# Patient Record
Sex: MALE | Race: WHITE | NOT HISPANIC OR LATINO | Employment: OTHER | URBAN - METROPOLITAN AREA
[De-identification: names, ages, dates, MRNs, and addresses within clinical notes are randomized per-mention and may not be internally consistent; named-entity substitution may affect disease eponyms.]

---

## 2017-02-23 ENCOUNTER — LAB VISIT (OUTPATIENT)
Dept: LAB | Facility: HOSPITAL | Age: 77
End: 2017-02-23
Attending: FAMILY MEDICINE
Payer: MEDICARE

## 2017-02-23 DIAGNOSIS — E11.9 TYPE 2 DIABETES MELLITUS WITHOUT COMPLICATION, WITHOUT LONG-TERM CURRENT USE OF INSULIN: ICD-10-CM

## 2017-02-23 PROCEDURE — 36415 COLL VENOUS BLD VENIPUNCTURE: CPT | Mod: PO

## 2017-02-23 PROCEDURE — 83036 HEMOGLOBIN GLYCOSYLATED A1C: CPT

## 2017-02-24 LAB
ESTIMATED AVG GLUCOSE: 166 MG/DL
HBA1C MFR BLD HPLC: 7.4 %

## 2017-03-27 ENCOUNTER — LAB VISIT (OUTPATIENT)
Dept: LAB | Facility: HOSPITAL | Age: 77
End: 2017-03-27
Attending: FAMILY MEDICINE
Payer: MEDICARE

## 2017-03-27 ENCOUNTER — OFFICE VISIT (OUTPATIENT)
Dept: FAMILY MEDICINE | Facility: CLINIC | Age: 77
End: 2017-03-27
Payer: MEDICARE

## 2017-03-27 VITALS
HEIGHT: 75 IN | HEART RATE: 76 BPM | DIASTOLIC BLOOD PRESSURE: 64 MMHG | SYSTOLIC BLOOD PRESSURE: 121 MMHG | BODY MASS INDEX: 21.95 KG/M2 | WEIGHT: 176.56 LBS | TEMPERATURE: 98 F

## 2017-03-27 DIAGNOSIS — R20.8 DECREASED VIBRATORY SENSE: ICD-10-CM

## 2017-03-27 DIAGNOSIS — M35.3 PMR (POLYMYALGIA RHEUMATICA): ICD-10-CM

## 2017-03-27 DIAGNOSIS — M35.3 PMR (POLYMYALGIA RHEUMATICA): Primary | ICD-10-CM

## 2017-03-27 DIAGNOSIS — R53.1 GENERAL WEAKNESS: ICD-10-CM

## 2017-03-27 DIAGNOSIS — R26.9 GAIT DISTURBANCE: ICD-10-CM

## 2017-03-27 DIAGNOSIS — E11.40 TYPE 2 DIABETES MELLITUS WITH DIABETIC NEUROPATHY, WITHOUT LONG-TERM CURRENT USE OF INSULIN: ICD-10-CM

## 2017-03-27 LAB
ALBUMIN SERPL BCP-MCNC: 3.9 G/DL
ALP SERPL-CCNC: 65 U/L
ALT SERPL W/O P-5'-P-CCNC: 23 U/L
ANION GAP SERPL CALC-SCNC: 12 MMOL/L
AST SERPL-CCNC: 17 U/L
BASOPHILS # BLD AUTO: 0.02 K/UL
BASOPHILS NFR BLD: 0.3 %
BILIRUB SERPL-MCNC: 0.7 MG/DL
BUN SERPL-MCNC: 35 MG/DL
CALCIUM SERPL-MCNC: 9.7 MG/DL
CHLORIDE SERPL-SCNC: 108 MMOL/L
CO2 SERPL-SCNC: 22 MMOL/L
CREAT SERPL-MCNC: 1.2 MG/DL
CRP SERPL-MCNC: 3 MG/L
DIFFERENTIAL METHOD: ABNORMAL
EOSINOPHIL # BLD AUTO: 0.1 K/UL
EOSINOPHIL NFR BLD: 1.6 %
ERYTHROCYTE [DISTWIDTH] IN BLOOD BY AUTOMATED COUNT: 12.6 %
ERYTHROCYTE [SEDIMENTATION RATE] IN BLOOD BY WESTERGREN METHOD: 8 MM/HR
EST. GFR  (AFRICAN AMERICAN): >60 ML/MIN/1.73 M^2
EST. GFR  (NON AFRICAN AMERICAN): 58.4 ML/MIN/1.73 M^2
GLUCOSE SERPL-MCNC: 128 MG/DL
HCT VFR BLD AUTO: 42.7 %
HGB BLD-MCNC: 14.4 G/DL
LYMPHOCYTES # BLD AUTO: 1.6 K/UL
LYMPHOCYTES NFR BLD: 21.3 %
MCH RBC QN AUTO: 32.8 PG
MCHC RBC AUTO-ENTMCNC: 33.7 %
MCV RBC AUTO: 97 FL
MONOCYTES # BLD AUTO: 0.7 K/UL
MONOCYTES NFR BLD: 8.8 %
NEUTROPHILS # BLD AUTO: 5.2 K/UL
NEUTROPHILS NFR BLD: 67.6 %
PLATELET # BLD AUTO: 246 K/UL
PMV BLD AUTO: 12.1 FL
POTASSIUM SERPL-SCNC: 4.2 MMOL/L
PROT SERPL-MCNC: 7.1 G/DL
RBC # BLD AUTO: 4.39 M/UL
SODIUM SERPL-SCNC: 142 MMOL/L
TSH SERPL DL<=0.005 MIU/L-ACNC: 1.06 UIU/ML
WBC # BLD AUTO: 7.62 K/UL

## 2017-03-27 PROCEDURE — G0009 ADMIN PNEUMOCOCCAL VACCINE: HCPCS | Mod: S$GLB,,, | Performed by: FAMILY MEDICINE

## 2017-03-27 PROCEDURE — 86140 C-REACTIVE PROTEIN: CPT

## 2017-03-27 PROCEDURE — 85651 RBC SED RATE NONAUTOMATED: CPT | Mod: PO

## 2017-03-27 PROCEDURE — 3078F DIAST BP <80 MM HG: CPT | Mod: S$GLB,,, | Performed by: FAMILY MEDICINE

## 2017-03-27 PROCEDURE — 1159F MED LIST DOCD IN RCRD: CPT | Mod: S$GLB,,, | Performed by: FAMILY MEDICINE

## 2017-03-27 PROCEDURE — 84443 ASSAY THYROID STIM HORMONE: CPT

## 2017-03-27 PROCEDURE — 90670 PCV13 VACCINE IM: CPT | Mod: S$GLB,,, | Performed by: FAMILY MEDICINE

## 2017-03-27 PROCEDURE — 99214 OFFICE O/P EST MOD 30 MIN: CPT | Mod: 25,S$GLB,, | Performed by: FAMILY MEDICINE

## 2017-03-27 PROCEDURE — 99999 PR PBB SHADOW E&M-EST. PATIENT-LVL III: CPT | Mod: PBBFAC,,, | Performed by: FAMILY MEDICINE

## 2017-03-27 PROCEDURE — 1157F ADVNC CARE PLAN IN RCRD: CPT | Mod: S$GLB,,, | Performed by: FAMILY MEDICINE

## 2017-03-27 PROCEDURE — 1160F RVW MEDS BY RX/DR IN RCRD: CPT | Mod: S$GLB,,, | Performed by: FAMILY MEDICINE

## 2017-03-27 PROCEDURE — 86592 SYPHILIS TEST NON-TREP QUAL: CPT

## 2017-03-27 PROCEDURE — 82607 VITAMIN B-12: CPT

## 2017-03-27 PROCEDURE — 80053 COMPREHEN METABOLIC PANEL: CPT

## 2017-03-27 PROCEDURE — 99499 UNLISTED E&M SERVICE: CPT | Mod: S$GLB,,, | Performed by: FAMILY MEDICINE

## 2017-03-27 PROCEDURE — 3074F SYST BP LT 130 MM HG: CPT | Mod: S$GLB,,, | Performed by: FAMILY MEDICINE

## 2017-03-27 PROCEDURE — 85025 COMPLETE CBC W/AUTO DIFF WBC: CPT

## 2017-03-27 PROCEDURE — 36415 COLL VENOUS BLD VENIPUNCTURE: CPT | Mod: PO

## 2017-03-27 NOTE — MR AVS SNAPSHOT
[facility]  [address] [address] [facility] [address]  Phone: [phone]  Fax: [phone]                  Jagdeep Greenberg   3/27/2017 11:00 AM   Office Visit    Description:  Male : 1940   Provider:  Panda Contreras MD   Department:  [facility]           Reason for Visit     Dizziness           Diagnoses this Visit        Comments    PMR (polymyalgia rheumatica)    -  Primary     Gait disturbance         Decreased vibratory sense         Type 2 diabetes mellitus with diabetic neuropathy, without long-term current use of insulin         General weakness                To Do List           Future Appointments        Provider Department Dept Phone    2017 8:00 AM Cayetano Thacker MD [facility] Neurology [phone]    2017 8:35 AM LABORATORY, [place] [facility] [phone]      Goals (5 Years of Data)     None      [facility] On Call     [facility] Care [facility] -  Assistance  Registered nurses in the [facility] provide clinical advisement, health education, appointment booking, and other advisory services.  Call for this free service at [phone].             Medications           Message regarding Medications     Verify the changes and/or additions to your medication regime listed below are the same as discussed with your clinician today.  If any of these changes or additions are incorrect, please notify your healthcare provider.        STOP taking these medications     azithromycin (Z-ANAI) 250 MG tablet Take 2 tablets on day 1, then 1 tablet daily on days 2 - 5.    ketoconazole (NIZORAL) 2 % shampoo Apply topically once daily for 2 weeks.  Wash off after 5 minutes.    metronidazole 0.75% (METROCREAM) 0.75 % Crea Apply topically 2 (two) times daily.           Verify that the below list of medications is an accurate representation of the medications you are currently taking.  If none reported, the list may be  "blank. If incorrect, please contact your healthcare provider. Carry this list with you in case of emergency.           Current Medications     amlodipine (NORVASC) 10 MG tablet TAKE 1 TABLET BY MOUTH EVERY DAY    aspirin (ECOTRIN) 81 MG EC tablet Take 1 tablet (81 mg total) by mouth once daily.    benazepril-hydrochlorthiazide (LOTENSIN HCT) 20-12.5 mg per tablet TAKE 1 TABLET BY MOUTH EVERY DAY    blood sugar diagnostic (GLUCOSE BLOOD) Strp Test glucose 1 x daily    glimepiride (AMARYL) 1 MG tablet TAKE 1 TABLET BY MOUTH ONCE DAILY    GLUC.METER,DIS.P-LOADED STRIPS (GLUCOSE METER, DISP & STRIPS) Kit Check glucose once per day    lancets Misc As directed    metformin (GLUCOPHAGE) 1000 MG tablet TAKE 1 TABLET BY MOUTH DAILY WITH BREAKFAST    pravastatin (PRAVACHOL) 20 MG tablet TAKE 1 TABLET BY MOUTH DAILY           Clinical Reference Information           Your Vitals Were     BP Pulse Temp Height Weight BMI    121/64 76 98.2 °F (36.8 °C) (Oral) 6' 3" (1.905 m) 80.1 kg (176 lb 9.4 oz) 22.07 kg/m2      Blood Pressure          Most Recent Value    BP  121/64      Allergies as of 3/27/2017     No Known Allergies      Immunizations Administered on Date of Encounter - 3/27/2017     Name Date Dose VIS Date Route    Pneumococcal Conjugate - 13 Valent 3/27/2017 0.5 mL 11/5/2015 Intramuscular      Orders Placed During Today's Visit      Normal Orders This Visit    Ambulatory referral to Neurology     Pneumococcal Conjugate Vaccine (13 Valent) (IM)     Future Labs/Procedures Expected by Expires    C-reactive protein  3/27/2017 5/26/2018    CBC auto differential  3/27/2017 3/27/2018    Comprehensive metabolic panel  3/27/2017 3/27/2018    RPR  3/27/2017 3/27/2018    Sedimentation rate, manual  3/27/2017 3/27/2018    TSH  3/27/2017 3/28/2018    Vitamin B12  3/27/2017 3/27/2018      Language Assistance Services     ATTENTION: Language assistance services are available, free of charge. Please call [phone].      ATENCIÓN: Si " habla osmin, tiene a foster disposición servicios gratuitos de asistencia lingüística. Llame al [phone].     CHÚ Ý: N?u b?n nói Ti?ng Vi?t, có các d?ch v? h? tr? ngôn ng? mi?n phí dành cho b?n. G?i s? [phone].         [facility] complies with applicable Federal civil rights laws and does not discriminate on the basis of race, color, national origin, age, disability, or sex.

## 2017-03-28 ENCOUNTER — TELEPHONE (OUTPATIENT)
Dept: FAMILY MEDICINE | Facility: CLINIC | Age: 77
End: 2017-03-28

## 2017-03-28 LAB
RPR SER QL: NORMAL
VIT B12 SERPL-MCNC: 309 PG/ML

## 2017-03-28 NOTE — TELEPHONE ENCOUNTER
I don't see any significant issues on his laboratory which would explain his symptoms.  The tests for inflammation are not elevated so I don't think he needs to go back on the prednisone.  Please see if we can try to move the neurology appointment up sooner, within the next 2 weeks if possible.     Patient informed we will have to call about sooner appointment and get back to him

## 2017-03-28 NOTE — PROGRESS NOTES
Patient presents with complaint of feeling unstable when walking.  He feels like he's leaning his body forward head of his feet.  Seems to be hard to get started area seems to be hard to get up out of the chair.  He does get some discomfort in the shoulders thighs and buttocks.  Previous polymyalgia rheumatica, but has been off steroids for a while.  He has diabetes with evidence of neuropathy.  Does state drinking significant amount of alcohol, but cut back recently.  He denies any focal weakness.  He denies any obvious vertigo.  Lab Results   Component Value Date    HGBA1C 7.4 (H) 02/23/2017     Past Medical History:  Past Medical History:   Diagnosis Date    Adenomatous polyp of colon     Arthritis     Carotid artery plaque     Less than 50%    Chronic kidney disease, stage 3     Diabetes mellitus, type 2     Erectile dysfunction following radical prostatectomy     Fractures     Hyperlipidemia LDL goal < 100     Hypertension goal BP (blood pressure) < 130/80     Prostate cancer 2001     Past Surgical History:   Procedure Laterality Date    APPENDECTOMY      COLONOSCOPY  In 3/2/2010    Repeat 5 years    COLONOSCOPY N/A 9/29/2015    Procedure: COLONOSCOPY;  Surgeon: Erik Brandt MD;  Location: [facility];  Service: Endoscopy;  Laterality: N/A;    COLONOSCOPY N/A 10/11/2016    Procedure: COLONOSCOPY;  Surgeon: Erik Brandt MD;  Location: [facility];  Service: Endoscopy;  Laterality: N/A;    FRACTURE SURGERY      HERNIA REPAIR      LYMPH NODE DISSECTION      Pelvic    PROSTATECTOMY      SHOULDER ARTHROSCOPY      Right     Social History     Social History    Marital status:      Spouse name: Joaquin    Number of children: 0    Years of education: N/A     Occupational History    Retired      Social History Main Topics    Smoking status: Never Smoker    Smokeless tobacco: Not on file    Alcohol use 21.0 oz/week     14 Cans of beer, 21 Shots of liquor per week    Drug use: Not on  file    Sexual activity: Not on file     Other Topics Concern    Not on file     Social History Narrative     Family History   Problem Relation Age of Onset    Leukemia Father 68     Review of patient's allergies indicates:  No Known Allergies  Current Outpatient Prescriptions on File Prior to Visit   Medication Sig Dispense Refill    amlodipine (NORVASC) 10 MG tablet TAKE 1 TABLET BY MOUTH EVERY DAY 90 tablet 3    aspirin (ECOTRIN) 81 MG EC tablet Take 1 tablet (81 mg total) by mouth once daily.  0    benazepril-hydrochlorthiazide (LOTENSIN HCT) 20-12.5 mg per tablet TAKE 1 TABLET BY MOUTH EVERY DAY 90 tablet 0    blood sugar diagnostic (GLUCOSE BLOOD) Strp Test glucose 1 x daily 35 strip prn    glimepiride (AMARYL) 1 MG tablet TAKE 1 TABLET BY MOUTH ONCE DAILY 90 tablet 3    GLUC.METER,DIS.P-LOADED STRIPS (GLUCOSE METER, DISP & STRIPS) Kit Check glucose once per day 1 kit prn    lancets Misc As directed 100 each prn    metformin (GLUCOPHAGE) 1000 MG tablet TAKE 1 TABLET BY MOUTH DAILY WITH BREAKFAST 90 tablet 3    pravastatin (PRAVACHOL) 20 MG tablet TAKE 1 TABLET BY MOUTH DAILY 90 tablet 11    [DISCONTINUED] azithromycin (Z-ANAI) 250 MG tablet Take 2 tablets on day 1, then 1 tablet daily on days 2 - 5. 6 tablet 0    [DISCONTINUED] ketoconazole (NIZORAL) 2 % shampoo Apply topically once daily for 2 weeks.  Wash off after 5 minutes. 120 mL 2    [DISCONTINUED] metronidazole 0.75% (METROCREAM) 0.75 % Crea Apply topically 2 (two) times daily. 60 g 11     No current facility-administered medications on file prior to visit.            ROS:  GENERAL: No fever, chills,  or significant weight changes.   CARDIOVASCULAR: Denies chest pain, PND, orthopnea or reduced exercise tolerance.  ABDOMEN: Appetite fine. Denies diarrhea, abdominal pain, hematemesis or blood in stool.  URINARY: No flank pain, dysuria or hematuria.    OBJECTIVE:   Vitals:    03/27/17 1056   BP: 121/64   Pulse: 76   Temp: 98.2 °F (36.8 °C)  "  [facility]: Oral   Weight: 80.1 kg (176 lb 9.4 oz)   Height: 6' 3" (1.905 m)     Wt Readings from Last 3 Encounters:   03/27/17 80.1 kg (176 lb 9.4 oz)   11/07/16 80.3 kg (177 lb 2.2 oz)   10/11/16 79.8 kg (176 lb)       APPEARANCE: Well nourished, well developed, in no acute distress.   HEAD: Normocephalic. Atraumatic. No sinus tenderness.   EYES:   Right eye: Pupil reactive. Conjunctiva clear.   Left eye: Pupil reactive. Conjunctiva clear.   . EOMI.   EARS: TM's intact. Light reflex normal. No retraction or perforation.   NOSE: clear.   MOUTH & THROAT: No pharyngeal erythema or exudate. No lesions.   NECK: No bruits. No JVD. No cervical lymphadenopathy. No thyromegaly.   CHEST: Breath sounds clear bilaterally. Normal respiratory effort   CARDIOVASCULAR: Normal rate. Regular rhythm. No murmurs. No rub. No gallops.   ABDOMEN: Bowel sounds normal. Soft. No tenderness. No organomegaly.   PERIPHERAL VASCULAR: No cyanosis. No clubbing. No edema.   NEUROLOGIC: Somewhat hyperreflexic.  He has knee reflex when tapping the opposite knee as well as when tapping the biceps tendon in the arm.  Difficulty with tandem gait and Romberg.  Fairly stable with straight line gait, but seems more unsteady with turning.  Feet:Sensation in the feet somewhat diminished to monofilament testing.  Vibratory sensation diminished  No significant foot lesions.Pulses palpable.  MENTAL STATUS: Alert. Oriented x 3.           Jagdeep was seen today for dizziness.    Diagnoses and all orders for this visit:    PMR (polymyalgia rheumatica)  -     Sedimentation rate, manual; Future  -     C-reactive protein; Future  -     Vitamin B12; Future    Gait disturbance  -     Vitamin B12; Future  -     RPR; Future  -     Ambulatory referral to Neurology    Decreased vibratory sense  -     Vitamin B12; Future  -     Ambulatory referral to Neurology    Type 2 diabetes mellitus with diabetic neuropathy, without long-term current use of insulin  -     Vitamin B12; " Future  -     TSH; Future  -     Comprehensive metabolic panel; Future  -     RPR; Future  -     Ambulatory referral to Neurology    General weakness  -     Vitamin B12; Future  -     TSH; Future  -     CBC auto differential; Future  -     Comprehensive metabolic panel; Future  -     RPR; Future  -     Ambulatory referral to Neurology    Other orders  -     Pneumococcal Conjugate Vaccine (13 Valent) (IM)     recommend avoid alcohol.  Further follow-up pending above laboratory may need to be back on steroids for the polymyalgia

## 2017-03-28 NOTE — TELEPHONE ENCOUNTER
Per Dr Contreras request, is there anyway patient can be seen within the next 2 weeks, please advise.

## 2017-03-28 NOTE — TELEPHONE ENCOUNTER
----- Message from Trang Lagunas sent at 3/28/2017  1:07 PM CDT -----  Patient returning nurse call. Please adv/call [phone].//thanks. cw

## 2017-03-29 RX ORDER — PRAVASTATIN SODIUM 20 MG/1
TABLET ORAL
Qty: 90 TABLET | Refills: 3 | Status: SHIPPED | OUTPATIENT
Start: 2017-03-29 | End: 2018-03-23 | Stop reason: SDUPTHER

## 2017-03-29 NOTE — TELEPHONE ENCOUNTER
Agreeable to go to [facility] for sooner appt.  Message sent to KACIE Kathleen to assist in scheduling.

## 2017-03-29 NOTE — TELEPHONE ENCOUNTER
----- Message from Jory Mendoza sent at 3/29/2017  3:01 PM CDT -----  Contact: Pt   Pt called and stated he was returning a call to the nurse. He can be reached at [phone].    Thanks,  TF

## 2017-03-29 NOTE — TELEPHONE ENCOUNTER
Everton Kathleen, RN with neurology, can get patient in, at [facility], in 2 weeks, patient does not want to go to [facility]  He will look into someone local, through his insurance co. And let us know if he goes outside of [facility], for referral.

## 2017-04-11 ENCOUNTER — OFFICE VISIT (OUTPATIENT)
Dept: NEUROLOGY | Facility: CLINIC | Age: 77
End: 2017-04-11
Payer: MEDICARE

## 2017-04-11 VITALS
SYSTOLIC BLOOD PRESSURE: 162 MMHG | DIASTOLIC BLOOD PRESSURE: 79 MMHG | BODY MASS INDEX: 22.07 KG/M2 | HEART RATE: 73 BPM | WEIGHT: 176.56 LBS

## 2017-04-11 DIAGNOSIS — E11.22 TYPE 2 DIABETES MELLITUS WITH STAGE 3 CHRONIC KIDNEY DISEASE, UNSPECIFIED LONG TERM INSULIN USE STATUS: Primary | ICD-10-CM

## 2017-04-11 DIAGNOSIS — N18.3 TYPE 2 DIABETES MELLITUS WITH STAGE 3 CHRONIC KIDNEY DISEASE, UNSPECIFIED LONG TERM INSULIN USE STATUS: Primary | ICD-10-CM

## 2017-04-11 DIAGNOSIS — R41.3 MEMORY CHANGES: ICD-10-CM

## 2017-04-11 DIAGNOSIS — G20.C PARKINSONISM, UNSPECIFIED PARKINSONISM TYPE: ICD-10-CM

## 2017-04-11 PROCEDURE — 1157F ADVNC CARE PLAN IN RCRD: CPT | Mod: S$GLB,,, | Performed by: PSYCHIATRY & NEUROLOGY

## 2017-04-11 PROCEDURE — 1160F RVW MEDS BY RX/DR IN RCRD: CPT | Mod: S$GLB,,, | Performed by: PSYCHIATRY & NEUROLOGY

## 2017-04-11 PROCEDURE — 99999 PR PBB SHADOW E&M-EST. PATIENT-LVL III: CPT | Mod: PBBFAC,,, | Performed by: PSYCHIATRY & NEUROLOGY

## 2017-04-11 PROCEDURE — 3077F SYST BP >= 140 MM HG: CPT | Mod: S$GLB,,, | Performed by: PSYCHIATRY & NEUROLOGY

## 2017-04-11 PROCEDURE — 1159F MED LIST DOCD IN RCRD: CPT | Mod: S$GLB,,, | Performed by: PSYCHIATRY & NEUROLOGY

## 2017-04-11 PROCEDURE — 99205 OFFICE O/P NEW HI 60 MIN: CPT | Mod: S$GLB,,, | Performed by: PSYCHIATRY & NEUROLOGY

## 2017-04-11 PROCEDURE — 3078F DIAST BP <80 MM HG: CPT | Mod: S$GLB,,, | Performed by: PSYCHIATRY & NEUROLOGY

## 2017-04-11 NOTE — PATIENT INSTRUCTIONS
Magnetic Resonance Imaging (MRI)     You will be asked to hold very still during the scan.   Magnetic resonance imaging (MRI) is a test that lets your doctor see detailed pictures of the inside of your body. MRI combines the use of strong magnets and radio waves to form an MRI image.  How do I get ready for an MRI?  · You may need to stop eating or drinking before the test. Each health care facility has its own guidelines on this. It also depends on the type of exam you are having. Ask your health care provider if you should stop eating or drinking before the test.  · Ask your provider if you should stop taking any medicine before the test.  · Follow your normal daily routine unless your provider tells you otherwise.  · You'll be asked to remove your watch, jewelry, hearing aids, credit cards, pens, pocket knives, eyeglasses, and other metal objects.  · You may be asked to remove your makeup. Makeup may contain some metal.  · Most MRI tests take 30 to 60 minutes. Depending on the type of MRI you are having, the test may take longer. Give yourself extra time to check in.     MRI uses strong magnets. Metal is affected by magnets and can distort the image. The magnet used in MRI can cause metal objects in your body to move. If you have a metal implant, you may not be able to have an MRI unless the implant is certified as MRI safe. People with these implants should not have an MRI:  · Ear (cochlear) implants  · Certain clips used for brain aneurysms  · Certain metal coils put in blood vessels  · Most defibrillators  · Most pacemakers  Be sure to tell the radiologist or technologist if you:  · Have had any previous surgeries  · Have a pacemaker, surgical clips, metal plate or pins, an artificial joint, staples or screws, ear (cochlear) implants, or other implants  · Wear a medicated adhesive patch  · Have metal splinters in your body  · Have implanted nerve stimulators or drug-infusion ports  · Have tattoos or body  piercings. Some tattoo inks contain metal.  · Work with metal  · Have braces. You must remove any dental work.  · Have a bullet or other metal in your body  Also tell the radiologist or technologist if you:  · Are pregnant or think you may be  · Are afraid of small, enclosed spaces (claustrophobic)  · Are allergic to X-ray dye (contrast medium), iodine, shellfish, or any medicines  · Have other allergies  · Are breastfeeding  · Have a history of cancer  · Have any serious health problems. This includes kidney disease or a liver transplant. You may not be able to have the contrast material used for MRI.      What happens during an MRI?  · You may be asked to wear a hospital gown.  · You may be given earplugs to wear if you need them.  · You may be injected with a special dye (contrast) that improves the MRI image.   · Youll lie down on a platform that slides into the magnet.  What happens after an MRI?  · You can get back to normal activities right away. If you were given contrast, it will pass naturally through your body within a day. You may be told to drink more water or other fluids during this time.   · Your doctor will discuss the test results with you during a follow-up appointment or over the phone.  · Your next appointment is: __________________  Date Last Reviewed: 6/2/2015  © 5097-0032 Personally. [address] [place] [address], [place] [address]. All rights reserved. This information is not intended as a substitute for professional medical care. Always follow your healthcare professional's instructions.

## 2017-04-11 NOTE — PROGRESS NOTES
[facility] - NEUROLOGY EPILEPSY  [facility] [place] [facility]    Date: April 11, 2017   Patient Name: Jagdeep Greenberg   MRN: [id number]   PCP: Panda Contreras  Referring Provider: Panda Contreras MD    Assessmentn and Plan:   Jagdeep Greenberg is a 76 y.o. male presenting for evaluation of gait and memory changes with parkinsonian features noted on exam. Will initiate workup with MRI jamaal to evaluate for underlying pathology and prior to consideration of Sinemet trial. Patient not currently on meds known to lead to drug induced parkinsonism. Given memory changes with MOCA of 17/30, have referred patient for neuropsychiatry evaluation and given gait changes as well as potential parkinsonism, have requested PT/OT eval.    Problem List Items Addressed This Visit        Neuro    Parkinsonism    Relevant Orders    MRI Brain W WO Contrast    Ambulatory consult to Neuropsychology    Ambulatory Referral to Physical/Occupational Therapy    Memory changes    Relevant Orders    MRI Brain W WO Contrast    Ambulatory consult to Neuropsychology       Renal    Type 2 diabetes mellitus with stage 3 chronic kidney disease - Primary       Followed by medicine    Ernie Harden MD  [facility]   Department of Neurology    Patient note was created using Dragon Dictation.  Any errors in syntax or even information may not have been identified and edited on initial review prior to signing this note.  Subjective:   Patient seen in consultation at the request of Dr. Contreras for the evaluation of gait unsteadiness. A copy of this note will be sent to the referring physician.      HPI:   Mr. Jagdeep Greenberg is a 76 y.o. male who presents with a chief complaint of gait unsteadiness and changes. The patient presents with his wife, who contributes to the history. They state that over the last 4-5 months, the patient has had difficulty walking. He states that when he is walking quickly, he has no  "issues, however when he is walking slowly or needs to come to an abrupt stop, he "stumbles over his feet and can't stop." His wife feels his posture has become hunched and he is leaning to the right. He states his handwriting has deteriorated and he has been experiencing episodes of stiffness in his hands and legs. He also struggles getting out of a chair and states he feels his coordination is off. He and his wife also report that his memory has deteriorated and he struggles with his short term memory (can't remember anything about his doctor visits) as well as remembering names and previously well known information like frequently used recipes.  The patient does have a history of DM but complains of only mild paresthesias in his feet with no quantifiable sensory loss.     PAST MEDICAL HISTORY:  Past Medical History:   Diagnosis Date    Adenomatous polyp of colon     Arthritis     Carotid artery plaque     Less than 50%    Chronic kidney disease, stage 3     Diabetes mellitus, type 2     Erectile dysfunction following radical prostatectomy     Fractures     Hyperlipidemia LDL goal < 100     Hypertension goal BP (blood pressure) < 130/80     Prostate cancer 2001     PAST SURGICAL HISTORY:  Past Surgical History:   Procedure Laterality Date    APPENDECTOMY      COLONOSCOPY  In 3/2/2010    Repeat 5 years    COLONOSCOPY N/A 9/29/2015    Procedure: COLONOSCOPY;  Surgeon: Erik Brandt MD;  [facility];  Service: Endoscopy;  Laterality: N/A;    COLONOSCOPY N/A 10/11/2016    Procedure: COLONOSCOPY;  Surgeon: Erik Brandt MD;  Location: [facility];  Service: Endoscopy;  Laterality: N/A;    FRACTURE SURGERY      HERNIA REPAIR      LYMPH NODE DISSECTION      Pelvic    PROSTATECTOMY      SHOULDER ARTHROSCOPY      Right     CURRENT MEDS:  Current Outpatient Prescriptions   Medication Sig Dispense Refill    amlodipine (NORVASC) 10 MG tablet TAKE 1 TABLET BY MOUTH EVERY DAY 90 tablet 3    " aspirin (ECOTRIN) 81 MG EC tablet Take 1 tablet (81 mg total) by mouth once daily.  0    benazepril-hydrochlorthiazide (LOTENSIN HCT) 20-12.5 mg per tablet TAKE 1 TABLET BY MOUTH EVERY DAY 90 tablet 0    blood sugar diagnostic (GLUCOSE BLOOD) Strp Test glucose 1 x daily 35 strip prn    glimepiride (AMARYL) 1 MG tablet TAKE 1 TABLET BY MOUTH ONCE DAILY 90 tablet 3    GLUC.METER,DIS.P-LOADED STRIPS (GLUCOSE METER, DISP & STRIPS) Kit Check glucose once per day 1 kit prn    lancets Misc As directed 100 each prn    metformin (GLUCOPHAGE) 1000 MG tablet TAKE 1 TABLET BY MOUTH DAILY WITH BREAKFAST 90 tablet 3    pravastatin (PRAVACHOL) 20 MG tablet TAKE 1 TABLET BY MOUTH DAILY 90 tablet 3     No current facility-administered medications for this visit.      ALLERGIES:  Review of patient's allergies indicates:  No Known Allergies    FAMILY HISTORY:  Family History   Problem Relation Age of Onset    Leukemia Father 68     SOCIAL HISTORY:  Social History   Substance Use Topics    Smoking status: Never Smoker    Smokeless tobacco: None    Alcohol use 21.0 oz/week     14 Cans of beer, 21 Shots of liquor per week     Review of Systems:  12 review of systems is negative except for the symptoms mentioned in HPI.      Objective:     Vitals:    04/11/17 1253   BP: (!) 162/79   Pulse: 73   Weight: 80.1 kg (176 lb 9.4 oz)     General: NAD, well nourished   Eyes: no tearing, discharge, no erythema   ENT: moist mucous membranes of the oral cavity, nares patent    Neck: Supple, full range of motion  Cardiovascular: Warm and well perfused, pulses equal and symmetrical  Lungs: Normal work of breathing, normal chest wall excursions  Skin: No rash, lesions, or breakdown on exposed skin  Psychiatry: Mood and affect are appropriate   Abdomen: soft, non tender, non distended  Extremeties: No cyanosis, clubbing or edema.    Neurological   MENTAL STATUS: Alert and oriented to person, place, and time. Attention and concentration within  normal limits. Speech without dysarthria, able to name and repeat with moderate Recent and remote memory fair to poor   CRANIAL NERVES: Visual fields intact. PERRL. EOMI with slow saccades. Facial sensation intact. Face symmetrical with hypomimia. Hearing grossly intact. Full shoulder shrug bilaterally. Tongue protrudes midline   SENSORY: Sensation is intact to light touch throughout.    MOTOR: Normal bulk and mildly increased tone in LUE>RUE. No resting tremor noted.  No pronator drift.  5/5 deltoid, biceps, triceps, interosseous, hand  bilaterally. 5/5 iliopsoas, knee extension/flexion, foot dorsi/plantarflexion bilaterally.    REFLEXES: Symmetric and 2+ throughout.   CEREBELLAR/COORDINATION/GAIT: Gait with slightly shortened stride and slightly reduced arm swing R>L. Positive pull back test.  Heel to shin intact. Finger to nose intact. Normal rapid alternating movements.     MOCA Results:   Visuospatial/Executive: 3/5  Namin/3  Attention: 4/6  Language: 1/3  Abstraction: 1/2  Delayed Recall: 0/5  Orientation:   Total:

## 2017-04-11 NOTE — LETTER
April 11, 2017      Panda Contreras MD  [address] [address] [place] [address]           [facility] - Neurology Epilepsy  [address], 7th [address]  [facility] [address]  Phone: [phone]  Fax: [phone]          Patient: Jagdeep Greenberg   MR Number: [id number]   YOB: 1940   Date of Visit: 4/11/2017       Dear Dr. Panda Contreras:    Thank you for referring Jagdeep Greenberg to me for evaluation. Attached you will find relevant portions of my assessment and plan of care.    If you have questions, please do not hesitate to call me. I look forward to following Jagdeep Greenberg along with you.    Sincerely,    Ernie Harden MD    Enclosure  CC:  No Recipients    If you would like to receive this communication electronically, please contact [email or web address] or [phone] to request more information on [employer] Link access.    For providers and/or their staff who would like to refer a patient to [facility], please contact us through our one-stop-shop provider referral line, [facility] [place], at [phone].    If you feel you have received this communication in error or would no longer like to receive these types of communications, please e-mail [email or web address]

## 2017-04-11 NOTE — MR AVS SNAPSHOT
[facility] - Neurology Epilepsy  [address], 7th [address]  [facility] [address]  Phone: [phone]  Fax: [phone]                  Jagdeep Greenberg   2017 1:00 PM   Office Visit    Description:  Male : 1940   Provider:  Ernie Harden MD   Department:  [facility] - Neurology Epilepsy           Diagnoses this Visit        Comments    Parkinsonism, unspecified Parkinsonism type         Memory changes                To Do List           Future Appointments        Provider Department Dept Phone    2017 8:35 AM LABORATORY, [place] [facility] [phone]      Goals (5 Years of Data)     None      [facility] On Call     [facility] On Call Nurse Care Line -  Assistance  Unless otherwise directed by your provider, please contact [facility], our nurse care line that is available for  assistance.     Registered nurses in the [facility] On Call Center provide: appointment scheduling, clinical advisement, health education, and other advisory services.  Call: [phone] (toll free)               Medications           Message regarding Medications     Verify the changes and/or additions to your medication regime listed below are the same as discussed with your clinician today.  If any of these changes or additions are incorrect, please notify your healthcare provider.             Verify that the below list of medications is an accurate representation of the medications you are currently taking.  If none reported, the list may be blank. If incorrect, please contact your healthcare provider. Carry this list with you in case of emergency.           Current Medications     amlodipine (NORVASC) 10 MG tablet TAKE 1 TABLET BY MOUTH EVERY DAY    aspirin (ECOTRIN) 81 MG EC tablet Take 1 tablet (81 mg total) by mouth once daily.    benazepril-hydrochlorthiazide (LOTENSIN HCT) 20-12.5 mg per tablet TAKE 1 TABLET BY MOUTH EVERY DAY    blood sugar diagnostic (GLUCOSE  BLOOD) Strp Test glucose 1 x daily    glimepiride (AMARYL) 1 MG tablet TAKE 1 TABLET BY MOUTH ONCE DAILY    GLUC.METER,DIS.P-LOADED STRIPS (GLUCOSE METER, DISP & STRIPS) Kit Check glucose once per day    lancets Misc As directed    metformin (GLUCOPHAGE) 1000 MG tablet TAKE 1 TABLET BY MOUTH DAILY WITH BREAKFAST    pravastatin (PRAVACHOL) 20 MG tablet TAKE 1 TABLET BY MOUTH DAILY           Clinical Reference Information           Your Vitals Were     BP Pulse Weight BMI       162/79 73 80.1 kg (176 lb 9.4 oz) 22.07 kg/m2       Blood Pressure          Most Recent Value    BP  (!)  162/79      Allergies as of 4/11/2017     No Known Allergies      Immunizations Administered on Date of Encounter - 4/11/2017     None      Orders Placed During Today's Visit      Normal Orders This Visit    Ambulatory consult to Neuropsychology     Ambulatory Referral to Physical/Occupational Therapy     Future Labs/Procedures Expected by Expires    MRI Brain W WO Contrast  4/11/2017 4/11/2018      Language Assistance Services     ATTENTION: Language assistance services are available, free of charge. Please call [phone].      ATENCIÓN: Si habla osmin, tiene a foster disposición servicios gratuitos de asistencia lingüística. Llame al [phone].     CHINEDU Ý: N?u b?n nói Ti?ng Vi?t, có các d?ch v? h? tr? ngôn ng? mi?n phí dành cho b?n. G?i s? [phone].         [facility] - Neurology Epilepsy complies with applicable Federal civil rights laws and does not discriminate on the basis of race, color, national origin, age, disability, or sex.

## 2017-04-12 ENCOUNTER — PATIENT MESSAGE (OUTPATIENT)
Dept: NEUROLOGY | Facility: CLINIC | Age: 77
End: 2017-04-12

## 2017-04-13 ENCOUNTER — TELEPHONE (OUTPATIENT)
Dept: FAMILY MEDICINE | Facility: CLINIC | Age: 77
End: 2017-04-13

## 2017-04-13 ENCOUNTER — PATIENT MESSAGE (OUTPATIENT)
Dept: FAMILY MEDICINE | Facility: CLINIC | Age: 77
End: 2017-04-13

## 2017-04-13 DIAGNOSIS — G20.C PARKINSONISM, UNSPECIFIED PARKINSONISM TYPE: Primary | ICD-10-CM

## 2017-04-18 ENCOUNTER — HOSPITAL ENCOUNTER (OUTPATIENT)
Dept: RADIOLOGY | Facility: HOSPITAL | Age: 77
Discharge: HOME | End: 2017-04-18
Attending: PSYCHIATRY & NEUROLOGY
Payer: MEDICARE

## 2017-04-18 DIAGNOSIS — R41.3 MEMORY CHANGES: ICD-10-CM

## 2017-04-18 DIAGNOSIS — G20.C PARKINSONISM, UNSPECIFIED PARKINSONISM TYPE: ICD-10-CM

## 2017-04-18 DIAGNOSIS — R41.3 MEMORY CHANGES: Primary | ICD-10-CM

## 2017-04-18 PROCEDURE — 25500020 PHARM REV CODE 255: Performed by: PSYCHIATRY & NEUROLOGY

## 2017-04-18 PROCEDURE — A9585 GADOBUTROL INJECTION: HCPCS | Performed by: PSYCHIATRY & NEUROLOGY

## 2017-04-18 PROCEDURE — 70553 MRI BRAIN STEM W/O & W/DYE: CPT | Mod: TC

## 2017-04-18 RX ORDER — GADOBUTROL 604.72 MG/ML
8 INJECTION INTRAVENOUS
Status: COMPLETED | OUTPATIENT
Start: 2017-04-18 | End: 2017-04-18

## 2017-04-18 RX ADMIN — GADOBUTROL 8 ML: 604.72 INJECTION INTRAVENOUS at 05:04

## 2017-04-19 ENCOUNTER — TELEPHONE (OUTPATIENT)
Dept: RHEUMATOLOGY | Facility: CLINIC | Age: 77
End: 2017-04-19

## 2017-04-19 ENCOUNTER — PATIENT MESSAGE (OUTPATIENT)
Dept: NEUROLOGY | Facility: CLINIC | Age: 77
End: 2017-04-19

## 2017-04-19 DIAGNOSIS — I63.9 ISCHEMIC STROKE: ICD-10-CM

## 2017-04-19 DIAGNOSIS — R26.0 ATAXIC GAIT: ICD-10-CM

## 2017-04-19 DIAGNOSIS — R41.3 MEMORY CHANGES: Primary | ICD-10-CM

## 2017-04-19 NOTE — TELEPHONE ENCOUNTER
----- Message from Susanna Vinson sent at 4/19/2017  8:59 AM CDT -----  Contact: Joaquin/spouse  Joaquin is requesting to speak to the nurse regarding working pt in for a sooner appt. Pt is having a lot of pain. Pls call Joaquin back at [phone].

## 2017-04-20 ENCOUNTER — HOSPITAL ENCOUNTER (OUTPATIENT)
Dept: RADIOLOGY | Facility: HOSPITAL | Age: 77
Discharge: HOME | End: 2017-04-20
Attending: INTERNAL MEDICINE
Payer: MEDICARE

## 2017-04-20 ENCOUNTER — OFFICE VISIT (OUTPATIENT)
Dept: RHEUMATOLOGY | Facility: CLINIC | Age: 77
End: 2017-04-20
Payer: MEDICARE

## 2017-04-20 VITALS
DIASTOLIC BLOOD PRESSURE: 77 MMHG | SYSTOLIC BLOOD PRESSURE: 153 MMHG | HEIGHT: 75 IN | BODY MASS INDEX: 21.98 KG/M2 | WEIGHT: 176.81 LBS | HEART RATE: 71 BPM

## 2017-04-20 DIAGNOSIS — M65.332 TRIGGER FINGER, LEFT MIDDLE FINGER: ICD-10-CM

## 2017-04-20 DIAGNOSIS — M54.50 CHRONIC MIDLINE LOW BACK PAIN WITHOUT SCIATICA: ICD-10-CM

## 2017-04-20 DIAGNOSIS — M25.50 PAIN, JOINT, MULTIPLE SITES: ICD-10-CM

## 2017-04-20 DIAGNOSIS — G89.29 CHRONIC MIDLINE LOW BACK PAIN WITHOUT SCIATICA: ICD-10-CM

## 2017-04-20 DIAGNOSIS — G89.29 CHRONIC GLUTEAL PAIN: ICD-10-CM

## 2017-04-20 DIAGNOSIS — M15.9 PRIMARY OSTEOARTHRITIS INVOLVING MULTIPLE JOINTS: ICD-10-CM

## 2017-04-20 DIAGNOSIS — M79.18 CHRONIC GLUTEAL PAIN: ICD-10-CM

## 2017-04-20 DIAGNOSIS — N18.3 TYPE 2 DIABETES MELLITUS WITH STAGE 3 CHRONIC KIDNEY DISEASE, UNSPECIFIED LONG TERM INSULIN USE STATUS: ICD-10-CM

## 2017-04-20 DIAGNOSIS — G89.29 CHRONIC LEFT SHOULDER PAIN: ICD-10-CM

## 2017-04-20 DIAGNOSIS — M35.3 PMR (POLYMYALGIA RHEUMATICA): Primary | ICD-10-CM

## 2017-04-20 DIAGNOSIS — M25.512 CHRONIC LEFT SHOULDER PAIN: ICD-10-CM

## 2017-04-20 DIAGNOSIS — M65.331 TRIGGER MIDDLE FINGER OF RIGHT HAND: ICD-10-CM

## 2017-04-20 DIAGNOSIS — M72.0 DUPUYTREN'S CONTRACTURE OF BOTH HANDS: ICD-10-CM

## 2017-04-20 DIAGNOSIS — M35.3 PMR (POLYMYALGIA RHEUMATICA): ICD-10-CM

## 2017-04-20 DIAGNOSIS — N18.30 CHRONIC KIDNEY DISEASE, STAGE 3: ICD-10-CM

## 2017-04-20 DIAGNOSIS — E11.22 TYPE 2 DIABETES MELLITUS WITH STAGE 3 CHRONIC KIDNEY DISEASE, UNSPECIFIED LONG TERM INSULIN USE STATUS: ICD-10-CM

## 2017-04-20 PROCEDURE — 73030 X-RAY EXAM OF SHOULDER: CPT | Mod: TC,PO,LT

## 2017-04-20 PROCEDURE — 72100 X-RAY EXAM L-S SPINE 2/3 VWS: CPT | Mod: 26,,, | Performed by: RADIOLOGY

## 2017-04-20 PROCEDURE — 1159F MED LIST DOCD IN RCRD: CPT | Mod: S$GLB,,, | Performed by: PHYSICIAN ASSISTANT

## 2017-04-20 PROCEDURE — 20550 NJX 1 TENDON SHEATH/LIGAMENT: CPT | Mod: 50,S$GLB,, | Performed by: PHYSICIAN ASSISTANT

## 2017-04-20 PROCEDURE — 3077F SYST BP >= 140 MM HG: CPT | Mod: S$GLB,,, | Performed by: PHYSICIAN ASSISTANT

## 2017-04-20 PROCEDURE — 73030 X-RAY EXAM OF SHOULDER: CPT | Mod: 26,LT,, | Performed by: RADIOLOGY

## 2017-04-20 PROCEDURE — 99999 PR PBB SHADOW E&M-EST. PATIENT-LVL III: CPT | Mod: PBBFAC,,, | Performed by: PHYSICIAN ASSISTANT

## 2017-04-20 PROCEDURE — 99499 UNLISTED E&M SERVICE: CPT | Mod: S$GLB,,, | Performed by: PHYSICIAN ASSISTANT

## 2017-04-20 PROCEDURE — 1125F AMNT PAIN NOTED PAIN PRSNT: CPT | Mod: S$GLB,,, | Performed by: PHYSICIAN ASSISTANT

## 2017-04-20 PROCEDURE — 1160F RVW MEDS BY RX/DR IN RCRD: CPT | Mod: S$GLB,,, | Performed by: PHYSICIAN ASSISTANT

## 2017-04-20 PROCEDURE — 3078F DIAST BP <80 MM HG: CPT | Mod: S$GLB,,, | Performed by: PHYSICIAN ASSISTANT

## 2017-04-20 PROCEDURE — 99214 OFFICE O/P EST MOD 30 MIN: CPT | Mod: 25,S$GLB,, | Performed by: PHYSICIAN ASSISTANT

## 2017-04-20 PROCEDURE — 72100 X-RAY EXAM L-S SPINE 2/3 VWS: CPT | Mod: TC,PO

## 2017-04-20 RX ORDER — BETAMETHASONE SODIUM PHOSPHATE AND BETAMETHASONE ACETATE 3; 3 MG/ML; MG/ML
0.6 INJECTION, SUSPENSION INTRA-ARTICULAR; INTRALESIONAL; INTRAMUSCULAR; SOFT TISSUE
Status: COMPLETED | OUTPATIENT
Start: 2017-04-20 | End: 2017-04-20

## 2017-04-20 RX ORDER — METHYLPREDNISOLONE ACETATE 80 MG/ML
8 INJECTION, SUSPENSION INTRA-ARTICULAR; INTRALESIONAL; INTRAMUSCULAR; SOFT TISSUE
Status: COMPLETED | OUTPATIENT
Start: 2017-04-20 | End: 2017-04-20

## 2017-04-20 RX ADMIN — BETAMETHASONE SODIUM PHOSPHATE AND BETAMETHASONE ACETATE 0.6 MG: 3; 3 INJECTION, SUSPENSION INTRA-ARTICULAR; INTRALESIONAL; INTRAMUSCULAR; SOFT TISSUE at 08:04

## 2017-04-20 RX ADMIN — METHYLPREDNISOLONE ACETATE 8 MG: 80 INJECTION, SUSPENSION INTRA-ARTICULAR; INTRALESIONAL; INTRAMUSCULAR; SOFT TISSUE at 08:04

## 2017-04-20 NOTE — PROGRESS NOTES
"Subjective:       Patient ID: Jagdeep Greenberg is a 76 y.o. male.    Chief Complaint: pmr and Osteoarthritis    HPI Comments: Jagdeep is here for follow up. Treated for PMR in the past. Weaned off prednisone almost 2 years now. No need to add dmard therapy. He reports multiple issues lately. will middle fingers are triggering. Having more hand pain. Lower back pain and will shoulder pain. Right shoulder is old from prior rotator cuff repair. Left shoulder is new. Denies injury. Just aching. He is able to move shoulder its just painful. The lower back is more gluteal region midline. No radicular pain. No numbness or tingling. Pain 4/10. He did see his pcp 3 weeks ago. Esr and crp were checked both normal. He is not having fever, no decreased apetite. No prolonged stiffness. Gelling and joint stiffness gets better with 5-10 min of activity. No redness or swelling has occurred. He has DM and CKD. Cannot take nsaids. Using tylenol for pain       Review of Systems   Constitutional: Negative.  Negative for chills, fatigue and fever.   HENT: Negative.  Negative for congestion, mouth sores and trouble swallowing.    Eyes: Negative.  Negative for photophobia, redness and visual disturbance.   Respiratory: Negative.  Negative for cough, shortness of breath and wheezing.    Cardiovascular: Negative.  Negative for chest pain and leg swelling.   Gastrointestinal: Negative.  Negative for abdominal distention, abdominal pain, diarrhea, nausea and vomiting.   Genitourinary: Negative.  Negative for dysuria, flank pain, frequency and urgency.   Musculoskeletal: Positive for arthralgias, back pain and myalgias. Negative for joint swelling, neck pain and neck stiffness.   Skin: Negative.  Negative for rash.   Neurological: Negative.  Negative for dizziness, weakness and numbness.         Objective:   BP (!) 153/77  Pulse 71  Ht 6' 3" (1.905 m)  Wt 80.2 kg (176 lb 12.9 oz)  BMI 22.1 kg/m2     Physical Exam   Constitutional: He is " oriented to person, place, and time and well-developed, well-nourished, and in no distress. No distress.   HENT:   Head: Normocephalic and atraumatic.   Right Ear: External ear normal.   Left Ear: External ear normal.   Mouth/Throat: No oropharyngeal exudate.   Eyes: Conjunctivae and EOM are normal. Pupils are equal, round, and reactive to light. No scleral icterus.   Neck: Neck supple. No thyromegaly present.   Cardiovascular: Normal rate, regular rhythm and normal heart sounds.    No murmur heard.  Pulmonary/Chest: Effort normal and breath sounds normal. No respiratory distress.   Abdominal: Soft. Bowel sounds are normal.       Right Side Rheumatological Exam     Examination finds the shoulder, elbow, wrist, knee, 1st PIP, 1st MCP, 2nd PIP, 2nd MCP, 3rd PIP, 4th PIP, 4th MCP, 5th PIP and 5th MCP normal.    The patient is tender to palpation of the 3rd MCP    Left Side Rheumatological Exam     Examination finds the elbow, wrist, knee, 1st PIP, 1st MCP, 2nd PIP, 2nd MCP, 3rd PIP, 4th PIP, 4th MCP, 5th PIP and 5th MCP normal.    The patient is tender to palpation of the shoulder and 3rd MCP.      Lymphadenopathy:     He has no cervical adenopathy.   Neurological: He is alert and oriented to person, place, and time. No cranial nerve deficit. He exhibits normal muscle tone. Gait normal. Coordination normal.   Skin: Skin is warm and dry.     Musculoskeletal: Normal range of motion. He exhibits no edema.   will middle finger triggering- mod  Tender nodule on palmar surface  No synovitis on exam              Recent Results (from the past 168 hour(s))   CREATININE, SERUM    Collection Time: 04/18/17  3:20 PM   Result Value Ref Range    Creatinine 1.0 0.5 - 1.4 mg/dL    eGFR if African American >60 >60 mL/min/1.73 m^2    eGFR if non African American >60 >60 mL/min/1.73 m^2   Rheumatoid factor    Collection Time: 04/20/17  8:56 AM   Result Value Ref Range    Rheumatoid Factor <10.0 0.0 - 15.0 IU/mL   Cyclic citrul peptide  antibody, IgG    Collection Time: 04/20/17  8:56 AM   Result Value Ref Range    CCP Antibodies <0.5 <5.0 U/mL   CBC auto differential    Collection Time: 04/20/17  8:56 AM   Result Value Ref Range    WBC 8.58 3.90 - 12.70 K/uL    RBC 4.53 (L) 4.60 - 6.20 M/uL    Hemoglobin 14.9 14.0 - 18.0 g/dL    Hematocrit 42.7 40.0 - 54.0 %    MCV 94 82 - 98 fL    MCH 32.9 (H) 27.0 - 31.0 pg    MCHC 34.9 32.0 - 36.0 %    RDW 12.1 11.5 - 14.5 %    Platelets 261 150 - 350 K/uL    MPV 10.9 9.2 - 12.9 fL    Gran # 6.3 1.8 - 7.7 K/uL    Lymph # 1.5 1.0 - 4.8 K/uL    Mono # 0.6 0.3 - 1.0 K/uL    Eos # 0.2 0.0 - 0.5 K/uL    Baso # 0.02 0.00 - 0.20 K/uL    Gran% 72.9 38.0 - 73.0 %    Lymph% 17.7 (L) 18.0 - 48.0 %    Mono% 7.2 4.0 - 15.0 %    Eosinophil% 2.0 0.0 - 8.0 %    Basophil% 0.2 0.0 - 1.9 %    Differential Method Automated    Comprehensive metabolic panel    Collection Time: 04/20/17  8:56 AM   Result Value Ref Range    Sodium 141 136 - 145 mmol/L    Potassium 3.7 3.5 - 5.1 mmol/L    Chloride 106 95 - 110 mmol/L    CO2 26 23 - 29 mmol/L    Glucose 169 (H) 70 - 110 mg/dL    BUN, Bld 18 8 - 23 mg/dL    Creatinine 1.0 0.5 - 1.4 mg/dL    Calcium 9.9 8.7 - 10.5 mg/dL    Total Protein 7.9 6.0 - 8.4 g/dL    Albumin 4.3 3.5 - 5.2 g/dL    Total Bilirubin 0.6 0.1 - 1.0 mg/dL    Alkaline Phosphatase 77 55 - 135 U/L    AST 20 10 - 40 U/L    ALT 33 10 - 44 U/L    Anion Gap 9 8 - 16 mmol/L    eGFR if African American >60 >60 mL/min/1.73 m^2    eGFR if non African American >60 >60 mL/min/1.73 m^2   C-reactive protein    Collection Time: 04/20/17  8:56 AM   Result Value Ref Range    CRP 7.3 0.0 - 8.2 mg/L   Sedimentation rate, manual    Collection Time: 04/20/17  8:56 AM   Result Value Ref Range    Sed Rate 12 (H) 0 - 10 mm/Hr           Component      Latest Ref Rng & Units 3/27/2017   WBC      3.90 - 12.70 K/uL 7.62   RBC      4.60 - 6.20 M/uL 4.39 (L)   Hemoglobin      14.0 - 18.0 g/dL 14.4   Hematocrit      40.0 - 54.0 % 42.7   MCV      82 -  98 fL 97   MCH      27.0 - 31.0 pg 32.8 (H)   MCHC      32.0 - 36.0 % 33.7   RDW      11.5 - 14.5 % 12.6   Platelets      150 - 350 K/uL 246   MPV      9.2 - 12.9 fL 12.1   Gran #      1.8 - 7.7 K/uL 5.2   Lymph #      1.0 - 4.8 K/uL 1.6   Mono #      0.3 - 1.0 K/uL 0.7   Eos #      0.0 - 0.5 K/uL 0.1   Baso #      0.00 - 0.20 K/uL 0.02   Gran%      38.0 - 73.0 % 67.6   Lymph%      18.0 - 48.0 % 21.3   Mono%      4.0 - 15.0 % 8.8   Eosinophil%      0.0 - 8.0 % 1.6   Basophil%      0.0 - 1.9 % 0.3   Differential Method       Automated   Sodium      136 - 145 mmol/L 142   Potassium      3.5 - 5.1 mmol/L 4.2   Chloride      95 - 110 mmol/L 108   CO2      23 - 29 mmol/L 22 (L)   Glucose      70 - 110 mg/dL 128 (H)   BUN, Bld      8 - 23 mg/dL 35 (H)   Creatinine      0.5 - 1.4 mg/dL 1.2   Calcium      8.7 - 10.5 mg/dL 9.7   Total Protein      6.0 - 8.4 g/dL 7.1   Albumin      3.5 - 5.2 g/dL 3.9   Total Bilirubin      0.1 - 1.0 mg/dL 0.7   Alkaline Phosphatase      55 - 135 U/L 65   AST      10 - 40 U/L 17   ALT      10 - 44 U/L 23   Anion Gap      8 - 16 mmol/L 12   eGFR if African American      >60 mL/min/1.73 m:2 >60.0   eGFR if non African American      >60 mL/min/1.73 m:2 58.4 (A)   Sed Rate      0 - 10 mm/Hr 8   CRP      0.0 - 8.2 mg/L 3.0   Vitamin B-12      210 - 950 pg/mL 309   TSH      0.400 - 4.000 uIU/mL 1.061   RPR      Non-reactive Non-reactive        Assessment:       1. PMR (polymyalgia rheumatica)    2. Primary osteoarthritis involving multiple joints    3. Chronic kidney disease, stage 3    4. Trigger middle finger of right hand    5. Trigger finger, left middle finger    6. Pain, joint, multiple sites    7. Type 2 diabetes mellitus with stage 3 chronic kidney disease, unspecified long term insulin use status    8. Chronic midline low back pain without sciatica    9. Chronic gluteal pain    10. Chronic left shoulder pain    11. Dupuytren's contracture of both hands          1. PMR- in remission, off  prednisone for 1.5 years- returns with will shoulder pain, gluteal pain, mild stiffness X 20 min, no synovitis- normal esr and crp 3 week ago- think this is more related to OA rather than PMR    PMR recurrence less likely with normal esr 3 weeks ago but will repeat today to make sure    Doubt progression to RA- no synovitis on exam   Most likely OA     2. Trigger finger will middle fingers    3. CKD and DM  not able to take nsaids    4. OA generalized    5. Left shoulder pain most likely OA vs tendonitis    6. Gluteal and lower back pain- will X-ray lumbar spine looking for degenerative changes there    7. Dupuytren's contracture- early and mild will middle flexor tendon    Plan:       Locally inject will trigger finger today, see below    The  Left middle tendon sheath was prepared with sterile prep. The skin was anesthetized with 1% ethyl chloride.  The  Left middle tendon sheath was injected with  a combination of 0.10 mL celestone/soluspan 30 mg/5 mL, 0.10 mL Depo-Medrol 80 mg/mL and 0.10 mL of 1% lidocaine.  Hemostasis was obtained.  The patient tolerated procedure well with no complications.  discharge and  icing instructions given to quincy    The Right  tendon sheath was prepared with sterile prep. The skin was anesthetized with 1% ethyl chloride.  The  Right  tendon sheath was injected with  a combination of 0.10 mL celestone/soluspan 30 mg/5 mL, 0.10 mL Depo-Medrol 80 mg/mL and 0.10 mL of 1% lidocaine.  Hemostasis was obtained.  The patient tolerated procedure well with no complications.  discharge and  icing instructions given to quincy    Discussed injection risk/potential side effects as described below  Risks of joint injections, steroid injections, and aspirations include but are not limited to:   Allergic reaction, Infection, Bleeding, Bruising, Post injection flare of joint swelling and pain, Skin depigmentation, Local fat atrophy, Tendon rupture, and/or Failure of symptoms to improve  Icing  instructions given to patient- ice area of injection for 15--20 min at least  3-4 X daily for the next 2-3 days.  If worsening and progressive pain develops please call the office       Avoid nsaids  Try tummeric 500-1000 mg bid  Tylenol for pain     Labs today esr and crp, ccp and rf    X-ray lumbar spine and left shoulder today    Can send to pt    At home exercise for dupuytren contracture      rtc 6 weeks no labs      Addendum- esr showed mild elevation, will add low dose prednisone 10 mg daily X 2 weeks then 5 mg daily til i see him back   Lumbar x-ray showed ddd L5-S1 and left shoulder x-ray showed GH and AC joint djd and calcification  Will move forward with thearpy and add tumeric  RF and ccp both neg  Pt verbalized understanding

## 2017-04-20 NOTE — MR AVS SNAPSHOT
[facility] Rheumatology  [address] [place] [address]  Phone: [phone]  Fax: [phone]                  Jagdeep Greenberg   2017 7:30 AM   Office Visit    Description:  Male : 1940   Provider:  Zarina Hurst PA-C   Department:  [facility] - Rheumatology           Reason for Visit     pmr     Osteoarthritis           Diagnoses this Visit        Comments    PMR (polymyalgia rheumatica)    -  Primary     Primary osteoarthritis involving multiple joints         Chronic kidney disease, stage 3         Trigger middle finger of right hand         Trigger finger, left middle finger         Pain, joint, multiple sites         Type 2 diabetes mellitus with stage 3 chronic kidney disease, unspecified long term insulin use status         Chronic midline low back pain without sciatica         Chronic gluteal pain         Chronic left shoulder pain         Dupuytren's contracture of both hands                To Do List           Future Appointments        Provider Department Dept Phone    2017 8:45 AM [facility] XR2 [facility] [phone]    2017 10:15 AM LABORATORY, [place] [facility] [phone]    2017 12:30 PM [facility] [address] [facility] [phone]    2017 8:35 AM LABORATORY, [place] [facility] [phone]    2017 11:30 AM Zarina Hurst PA-C [facility] Rheumatology [phone]      Goals (5 Years of Data)     None      [facility]     [facility] Care [facility] -  Assistance  Unless otherwise directed by your provider, please contact [facility] On-Call, our nurse care line that is available for  assistance.     Registered nurses in the [facility] Center provide: appointment scheduling, clinical advisement, health education, and other advisory services.  Call: [phone] (toll free)               Medications           Message regarding Medications     Verify the changes and/or  additions to your medication regime listed below are the same as discussed with your clinician today.  If any of these changes or additions are incorrect, please notify your healthcare provider.        These medications were administered today        Dose Freq    betamethasone acetate-betamethasone sodium phosphate injection 0.6 mg 0.6 mg Clinic/HOD 1 time    Si.1 mLs (0.6 mg total) by Tendon Sheath Injection route one time.    Class: Normal    Route: Tendon Sheath Injection    betamethasone acetate-betamethasone sodium phosphate injection 0.6 mg 0.6 mg Clinic/HOD 1 time    Si.1 mLs (0.6 mg total) by Tendon Sheath Injection route one time.    Class: Normal    Route: Tendon Sheath Injection    methylPREDNISolone acetate injection 8 mg 8 mg Clinic/HOD 1 time    Si.1 mLs (8 mg total) by Tendon Sheath Injection route one time.    Class: Normal    Route: Tendon Sheath Injection    methylPREDNISolone acetate injection 8 mg 8 mg Clinic/HOD 1 time    Si.1 mLs (8 mg total) by Tendon Sheath Injection route one time.    Class: Normal    Route: Tendon Sheath Injection           Verify that the below list of medications is an accurate representation of the medications you are currently taking.  If none reported, the list may be blank. If incorrect, please contact your healthcare provider. Carry this list with you in case of emergency.           Current Medications     amlodipine (NORVASC) 10 MG tablet TAKE 1 TABLET BY MOUTH EVERY DAY    aspirin (ECOTRIN) 81 MG EC tablet Take 1 tablet (81 mg total) by mouth once daily.    benazepril-hydrochlorthiazide (LOTENSIN HCT) 20-12.5 mg per tablet TAKE 1 TABLET BY MOUTH EVERY DAY    blood sugar diagnostic (GLUCOSE BLOOD) Strp Test glucose 1 x daily    glimepiride (AMARYL) 1 MG tablet TAKE 1 TABLET BY MOUTH ONCE DAILY    GLUC.METER,DIS.P-LOADED STRIPS (GLUCOSE METER, DISP & STRIPS) Kit Check glucose once per day    lancets Misc As directed    metformin (GLUCOPHAGE) 1000 MG  "tablet TAKE 1 TABLET BY MOUTH DAILY WITH BREAKFAST    pravastatin (PRAVACHOL) 20 MG tablet TAKE 1 TABLET BY MOUTH DAILY           Clinical Reference Information           Your Vitals Were     BP Pulse Height Weight BMI    153/77 71 6' 3" (1.905 m) 80.2 kg (176 lb 12.9 oz) 22.1 kg/m2      Blood Pressure          Most Recent Value    BP  (!)  153/77      Allergies as of 4/20/2017     No Known Allergies      Immunizations Administered on Date of Encounter - 4/20/2017     None      Orders Placed During Today's Visit     Future Labs/Procedures Expected by Expires    Cyclic citrul peptide antibody, IgG  4/20/2017 6/19/2018    Rheumatoid factor  4/20/2017 6/19/2018    X-Ray Lumbar Spine Ap And Lateral  4/20/2017 4/20/2018    X-Ray Shoulder 2 or More Views Left  4/20/2017 4/20/2018    Recurring Lab Work Interval Expires    C-reactive protein   4/20/2018    CBC auto differential   4/20/2018    Comprehensive metabolic panel   4/20/2018    Sedimentation rate, manual   4/20/2018      Administrations This Visit     betamethasone acetate-betamethasone sodium phosphate injection 0.6 mg     Admin Date Action Dose Route Administered By             04/20/2017 Given 0.6 mg Tendon Sheath Injection Zarina Hurst PA-C              Admin Date Action Dose Route Administered By             04/20/2017 Given 0.6 mg Tendon Sheath Injection Zarina Hurst PA-C                    methylPREDNISolone acetate injection 8 mg     Admin Date Action Dose Route Administered By             04/20/2017 Given 8 mg Tendon Sheath Injection Zarina Hurst PA-C              Admin Date Action Dose Route Administered By             04/20/2017 Given 8 mg Tendon Sheath Injection Zarina Hurst PA-C                      Language Assistance Services     ATTENTION: Language assistance services are available, free of charge. Please call [phone].      ATENCIÓN: Si habla español, tiene a foster disposición servicios gratuitos de asistencia lingüística. Llame " al [phone].     CHINEDU Ý: N?u b?n nói Ti?ng Vi?t, có các d?ch v? h? tr? ngôn ng? mi?n phí dành cho b?n. G?i s? [phone].         [facility] - Rheumatology complies with applicable Federal civil rights laws and does not discriminate on the basis of race, color, national origin, age, disability, or sex.

## 2017-04-21 RX ORDER — BENAZEPRIL HYDROCHLORIDE AND HYDROCHLOROTHIAZIDE 20; 12.5 MG/1; MG/1
TABLET ORAL
Qty: 90 TABLET | Refills: 3 | Status: SHIPPED | OUTPATIENT
Start: 2017-04-21 | End: 2018-04-19 | Stop reason: SDUPTHER

## 2017-04-21 RX ORDER — PREDNISONE 5 MG/1
5 TABLET ORAL 2 TIMES DAILY
Qty: 60 TABLET | Refills: 2 | Status: SHIPPED | OUTPATIENT
Start: 2017-04-21 | End: 2017-07-28 | Stop reason: SDUPTHER

## 2017-04-24 ENCOUNTER — HOSPITAL ENCOUNTER (OUTPATIENT)
Dept: RADIOLOGY | Facility: HOSPITAL | Age: 77
Discharge: HOME | End: 2017-04-24
Attending: PSYCHIATRY & NEUROLOGY
Payer: MEDICARE

## 2017-04-24 DIAGNOSIS — R26.0 ATAXIC GAIT: ICD-10-CM

## 2017-04-24 DIAGNOSIS — I63.9 ISCHEMIC STROKE: ICD-10-CM

## 2017-04-24 PROCEDURE — 93880 EXTRACRANIAL BILAT STUDY: CPT | Mod: 26,,, | Performed by: RADIOLOGY

## 2017-04-24 PROCEDURE — 93880 EXTRACRANIAL BILAT STUDY: CPT | Mod: TC,PO

## 2017-04-25 ENCOUNTER — TELEPHONE (OUTPATIENT)
Dept: RHEUMATOLOGY | Facility: CLINIC | Age: 77
End: 2017-04-25

## 2017-04-25 ENCOUNTER — PATIENT MESSAGE (OUTPATIENT)
Dept: RHEUMATOLOGY | Facility: CLINIC | Age: 77
End: 2017-04-25

## 2017-04-25 DIAGNOSIS — M25.512 CHRONIC LEFT SHOULDER PAIN: ICD-10-CM

## 2017-04-25 DIAGNOSIS — M54.50 CHRONIC MIDLINE LOW BACK PAIN WITHOUT SCIATICA: Primary | ICD-10-CM

## 2017-04-25 DIAGNOSIS — G89.29 CHRONIC MIDLINE LOW BACK PAIN WITHOUT SCIATICA: Primary | ICD-10-CM

## 2017-04-25 DIAGNOSIS — M19.012 PRIMARY OSTEOARTHRITIS, LEFT SHOULDER: ICD-10-CM

## 2017-04-25 DIAGNOSIS — G89.29 CHRONIC LEFT SHOULDER PAIN: ICD-10-CM

## 2017-04-25 RX ORDER — CARBIDOPA AND LEVODOPA 25; 100 MG/1; MG/1
TABLET ORAL
Qty: 45 TABLET | Refills: 11 | Status: SHIPPED | OUTPATIENT
Start: 2017-04-25 | End: 2018-04-11 | Stop reason: SDUPTHER

## 2017-04-25 NOTE — TELEPHONE ENCOUNTER
Pt for lumbar spine and left shoulder     External referral to [facility]

## 2017-04-26 ENCOUNTER — PATIENT MESSAGE (OUTPATIENT)
Dept: NEUROLOGY | Facility: CLINIC | Age: 77
End: 2017-04-26

## 2017-04-26 ENCOUNTER — TELEPHONE (OUTPATIENT)
Dept: NEUROLOGY | Facility: CLINIC | Age: 77
End: 2017-04-26

## 2017-04-26 NOTE — TELEPHONE ENCOUNTER
----- Message from Liset Osuna sent at 4/25/2017  2:17 PM CDT -----  Contact: ms graham [facility] [phone]  Ms santos is requesting to speak with you regarding patient

## 2017-05-04 ENCOUNTER — PATIENT MESSAGE (OUTPATIENT)
Dept: NEUROLOGY | Facility: CLINIC | Age: 77
End: 2017-05-04

## 2017-05-09 RX ORDER — METFORMIN HYDROCHLORIDE 1000 MG/1
TABLET ORAL
Qty: 90 TABLET | Refills: 3 | Status: SHIPPED | OUTPATIENT
Start: 2017-05-09 | End: 2018-05-02 | Stop reason: SDUPTHER

## 2017-05-09 RX ORDER — GLIMEPIRIDE 1 MG/1
TABLET ORAL
Qty: 90 TABLET | Refills: 0 | Status: SHIPPED | OUTPATIENT
Start: 2017-05-09 | End: 2017-05-30 | Stop reason: DRUGHIGH

## 2017-05-26 ENCOUNTER — LAB VISIT (OUTPATIENT)
Dept: LAB | Facility: HOSPITAL | Age: 77
End: 2017-05-26
Attending: FAMILY MEDICINE
Payer: MEDICARE

## 2017-05-26 DIAGNOSIS — E11.9 TYPE 2 DIABETES MELLITUS WITHOUT COMPLICATION, WITHOUT LONG-TERM CURRENT USE OF INSULIN: ICD-10-CM

## 2017-05-26 PROCEDURE — 36415 COLL VENOUS BLD VENIPUNCTURE: CPT | Mod: PO

## 2017-05-26 PROCEDURE — 83036 HEMOGLOBIN GLYCOSYLATED A1C: CPT

## 2017-05-27 LAB
ESTIMATED AVG GLUCOSE: 171 MG/DL
HBA1C MFR BLD HPLC: 7.6 %

## 2017-05-30 RX ORDER — GLIMEPIRIDE 2 MG/1
2 TABLET ORAL DAILY
Qty: 90 TABLET | Refills: 1 | Status: SHIPPED | OUTPATIENT
Start: 2017-05-30 | End: 2017-10-09 | Stop reason: SDUPTHER

## 2017-05-30 RX ORDER — GLIMEPIRIDE 2 MG/1
2 TABLET ORAL DAILY
COMMUNITY
End: 2017-05-30 | Stop reason: SDUPTHER

## 2017-05-30 NOTE — TELEPHONE ENCOUNTER
MD CHARU Blas Staff             Sugar test is increasing.  Recommend increase glimepiride 2 mg daily.  Continue other medication as currently.  Recheck hemoglobin A1c 3 months.

## 2017-06-05 ENCOUNTER — LAB VISIT (OUTPATIENT)
Dept: LAB | Facility: HOSPITAL | Age: 77
End: 2017-06-05
Attending: INTERNAL MEDICINE
Payer: MEDICARE

## 2017-06-05 ENCOUNTER — OFFICE VISIT (OUTPATIENT)
Dept: RHEUMATOLOGY | Facility: CLINIC | Age: 77
End: 2017-06-05
Payer: MEDICARE

## 2017-06-05 VITALS
WEIGHT: 175.94 LBS | HEIGHT: 75 IN | SYSTOLIC BLOOD PRESSURE: 120 MMHG | HEART RATE: 76 BPM | DIASTOLIC BLOOD PRESSURE: 58 MMHG | BODY MASS INDEX: 21.87 KG/M2

## 2017-06-05 DIAGNOSIS — M72.0 DUPUYTREN'S CONTRACTURE OF BOTH HANDS: ICD-10-CM

## 2017-06-05 DIAGNOSIS — M25.50 PAIN, JOINT, MULTIPLE SITES: ICD-10-CM

## 2017-06-05 DIAGNOSIS — N18.30 CHRONIC KIDNEY DISEASE, STAGE 3: ICD-10-CM

## 2017-06-05 DIAGNOSIS — N18.3 TYPE 2 DIABETES MELLITUS WITH STAGE 3 CHRONIC KIDNEY DISEASE, UNSPECIFIED LONG TERM INSULIN USE STATUS: ICD-10-CM

## 2017-06-05 DIAGNOSIS — E11.22 TYPE 2 DIABETES MELLITUS WITH STAGE 3 CHRONIC KIDNEY DISEASE, UNSPECIFIED LONG TERM INSULIN USE STATUS: ICD-10-CM

## 2017-06-05 DIAGNOSIS — M35.3 PMR (POLYMYALGIA RHEUMATICA): ICD-10-CM

## 2017-06-05 DIAGNOSIS — M35.3 PMR (POLYMYALGIA RHEUMATICA): Primary | ICD-10-CM

## 2017-06-05 DIAGNOSIS — M15.9 PRIMARY OSTEOARTHRITIS INVOLVING MULTIPLE JOINTS: ICD-10-CM

## 2017-06-05 PROBLEM — M65.332 TRIGGER FINGER, LEFT MIDDLE FINGER: Status: RESOLVED | Noted: 2017-04-20 | Resolved: 2017-06-05

## 2017-06-05 LAB
ALBUMIN SERPL BCP-MCNC: 4.4 G/DL
ALP SERPL-CCNC: 47 U/L
ALT SERPL W/O P-5'-P-CCNC: 18 U/L
ANION GAP SERPL CALC-SCNC: 9 MMOL/L
AST SERPL-CCNC: 18 U/L
BASOPHILS # BLD AUTO: 0.02 K/UL
BASOPHILS NFR BLD: 0.2 %
BILIRUB SERPL-MCNC: 0.7 MG/DL
BUN SERPL-MCNC: 21 MG/DL
CALCIUM SERPL-MCNC: 10 MG/DL
CHLORIDE SERPL-SCNC: 104 MMOL/L
CO2 SERPL-SCNC: 27 MMOL/L
CREAT SERPL-MCNC: 1.1 MG/DL
CRP SERPL-MCNC: 0.7 MG/L
DIFFERENTIAL METHOD: ABNORMAL
EOSINOPHIL # BLD AUTO: 0.1 K/UL
EOSINOPHIL NFR BLD: 0.9 %
ERYTHROCYTE [DISTWIDTH] IN BLOOD BY AUTOMATED COUNT: 12.6 %
ERYTHROCYTE [SEDIMENTATION RATE] IN BLOOD BY WESTERGREN METHOD: 2 MM/HR
EST. GFR  (AFRICAN AMERICAN): >60 ML/MIN/1.73 M^2
EST. GFR  (NON AFRICAN AMERICAN): >60 ML/MIN/1.73 M^2
GLUCOSE SERPL-MCNC: 144 MG/DL
HCT VFR BLD AUTO: 41.8 %
HGB BLD-MCNC: 14.6 G/DL
LYMPHOCYTES # BLD AUTO: 0.9 K/UL
LYMPHOCYTES NFR BLD: 11.3 %
MCH RBC QN AUTO: 33 PG
MCHC RBC AUTO-ENTMCNC: 34.9 %
MCV RBC AUTO: 95 FL
MONOCYTES # BLD AUTO: 0.7 K/UL
MONOCYTES NFR BLD: 8.6 %
NEUTROPHILS # BLD AUTO: 6.5 K/UL
NEUTROPHILS NFR BLD: 79 %
PLATELET # BLD AUTO: 212 K/UL
PMV BLD AUTO: 10.6 FL
POTASSIUM SERPL-SCNC: 4.4 MMOL/L
PROT SERPL-MCNC: 7.3 G/DL
RBC # BLD AUTO: 4.42 M/UL
SODIUM SERPL-SCNC: 140 MMOL/L
WBC # BLD AUTO: 8.16 K/UL

## 2017-06-05 PROCEDURE — 85025 COMPLETE CBC W/AUTO DIFF WBC: CPT | Mod: PO

## 2017-06-05 PROCEDURE — 86140 C-REACTIVE PROTEIN: CPT

## 2017-06-05 PROCEDURE — 99999 PR PBB SHADOW E&M-EST. PATIENT-LVL III: CPT | Mod: PBBFAC,,, | Performed by: PHYSICIAN ASSISTANT

## 2017-06-05 PROCEDURE — 99214 OFFICE O/P EST MOD 30 MIN: CPT | Mod: S$GLB,,, | Performed by: PHYSICIAN ASSISTANT

## 2017-06-05 PROCEDURE — 99499 UNLISTED E&M SERVICE: CPT | Mod: S$GLB,,, | Performed by: PHYSICIAN ASSISTANT

## 2017-06-05 PROCEDURE — 36415 COLL VENOUS BLD VENIPUNCTURE: CPT | Mod: PO

## 2017-06-05 PROCEDURE — 1126F AMNT PAIN NOTED NONE PRSNT: CPT | Mod: S$GLB,,, | Performed by: PHYSICIAN ASSISTANT

## 2017-06-05 PROCEDURE — 80053 COMPREHEN METABOLIC PANEL: CPT | Mod: PO

## 2017-06-05 PROCEDURE — 85651 RBC SED RATE NONAUTOMATED: CPT | Mod: PO

## 2017-06-05 PROCEDURE — 1159F MED LIST DOCD IN RCRD: CPT | Mod: S$GLB,,, | Performed by: PHYSICIAN ASSISTANT

## 2017-06-05 RX ORDER — PREDNISONE 5 MG/1
5 TABLET ORAL 2 TIMES DAILY
Refills: 2 | COMMUNITY
Start: 2017-05-16 | End: 2017-07-28 | Stop reason: SDUPTHER

## 2017-06-05 NOTE — PROGRESS NOTES
"Subjective:       Patient ID: Jagdeep Greenberg is a 76 y.o. male.    Chief Complaint: PMR    Jagdeep is here for follow up. Treated for PMR in the past. Weaned off prednisone  Over 2 years now. No need to add dmard therapy. At last visit he had multiple issues. Increased joint pain and muscle aches. Stiffness awais hip and shoulder area. Esr was mildly elevated at 12. He felt like when dx with prm. Also trigger finger on both hands. We injected the tendon sheath and the trigger finger resolved. We added back prednisone 5 mg bid and he felt better within 2 days. Also suggested adding turmeric 500-1000 mg bid. He is taking this regularly.  He reports doing much better today pain level 0/10.  No muscle aches or joint pain especially in his hips and shoulders.  No fevers, no weight loss.  Triggering resolved.   Unable to take nonsteroidals because of chronic kidney disease.  In the past uses Tylenol when necessary for pain.          Review of Systems   Constitutional: Negative.  Negative for chills, fatigue and fever.   HENT: Negative.  Negative for congestion, mouth sores and trouble swallowing.    Eyes: Negative.  Negative for photophobia, redness and visual disturbance.   Respiratory: Negative.  Negative for cough, shortness of breath and wheezing.    Cardiovascular: Negative.  Negative for chest pain and leg swelling.   Gastrointestinal: Negative.  Negative for abdominal distention, abdominal pain, diarrhea, nausea and vomiting.   Genitourinary: Negative.  Negative for dysuria, flank pain, frequency and urgency.   Musculoskeletal: Negative for arthralgias, back pain, joint swelling, myalgias, neck pain and neck stiffness.   Skin: Negative.  Negative for rash.   Neurological: Negative.  Negative for dizziness, weakness and numbness.         Objective:   BP (!) 120/58   Pulse 76   Ht 6' 3" (1.905 m)   Wt 79.8 kg (175 lb 14.8 oz)   BMI 21.99 kg/m²      Physical Exam   Constitutional: He is oriented to person, place, " and time and well-developed, well-nourished, and in no distress. No distress.   HENT:   Head: Normocephalic and atraumatic.   Right Ear: External ear normal.   Left Ear: External ear normal.   Mouth/Throat: No oropharyngeal exudate.   Eyes: Conjunctivae and EOM are normal. Pupils are equal, round, and reactive to light. No scleral icterus.   Neck: Neck supple. No thyromegaly present.   Cardiovascular: Normal rate, regular rhythm and normal heart sounds.    No murmur heard.  Pulmonary/Chest: Effort normal and breath sounds normal. No respiratory distress.   Abdominal: Soft. Bowel sounds are normal.       Right Side Rheumatological Exam     Examination finds the shoulder, elbow, wrist, knee, 1st PIP, 1st MCP, 2nd PIP, 2nd MCP, 3rd PIP, 4th PIP, 4th MCP, 5th PIP and 5th MCP normal.    The patient is tender to palpation of the 3rd MCP    Left Side Rheumatological Exam     Examination finds the elbow, wrist, knee, 1st PIP, 1st MCP, 2nd PIP, 2nd MCP, 3rd PIP, 4th PIP, 4th MCP, 5th PIP and 5th MCP normal.    The patient is tender to palpation of the shoulder and 3rd MCP.      Lymphadenopathy:     He has no cervical adenopathy.   Neurological: He is alert and oriented to person, place, and time. No cranial nerve deficit. He exhibits normal muscle tone. Gait normal. Coordination normal.   Skin: Skin is warm and dry.     Musculoskeletal: Normal range of motion. He exhibits no edema.   No triggering today  He does have early Dupuytren's contracture bilateral third fingers  No synovitis on exam              Recent Results (from the past 168 hour(s))   CBC auto differential    Collection Time: 06/05/17 12:20 PM   Result Value Ref Range    WBC 8.16 3.90 - 12.70 K/uL    RBC 4.42 (L) 4.60 - 6.20 M/uL    Hemoglobin 14.6 14.0 - 18.0 g/dL    Hematocrit 41.8 40.0 - 54.0 %    MCV 95 82 - 98 fL    MCH 33.0 (H) 27.0 - 31.0 pg    MCHC 34.9 32.0 - 36.0 %    RDW 12.6 11.5 - 14.5 %    Platelets 212 150 - 350 K/uL    MPV 10.6 9.2 - 12.9 fL     Gran # 6.5 1.8 - 7.7 K/uL    Lymph # 0.9 (L) 1.0 - 4.8 K/uL    Mono # 0.7 0.3 - 1.0 K/uL    Eos # 0.1 0.0 - 0.5 K/uL    Baso # 0.02 0.00 - 0.20 K/uL    Gran% 79.0 (H) 38.0 - 73.0 %    Lymph% 11.3 (L) 18.0 - 48.0 %    Mono% 8.6 4.0 - 15.0 %    Eosinophil% 0.9 0.0 - 8.0 %    Basophil% 0.2 0.0 - 1.9 %    Differential Method Automated    Comprehensive metabolic panel    Collection Time: 06/05/17 12:20 PM   Result Value Ref Range    Sodium 140 136 - 145 mmol/L    Potassium 4.4 3.5 - 5.1 mmol/L    Chloride 104 95 - 110 mmol/L    CO2 27 23 - 29 mmol/L    Glucose 144 (H) 70 - 110 mg/dL    BUN, Bld 21 8 - 23 mg/dL    Creatinine 1.1 0.5 - 1.4 mg/dL    Calcium 10.0 8.7 - 10.5 mg/dL    Total Protein 7.3 6.0 - 8.4 g/dL    Albumin 4.4 3.5 - 5.2 g/dL    Total Bilirubin 0.7 0.1 - 1.0 mg/dL    Alkaline Phosphatase 47 (L) 55 - 135 U/L    AST 18 10 - 40 U/L    ALT 18 10 - 44 U/L    Anion Gap 9 8 - 16 mmol/L    eGFR if African American >60 >60 mL/min/1.73 m^2    eGFR if non African American >60 >60 mL/min/1.73 m^2              Assessment:       1. PMR (polymyalgia rheumatica)    2. Primary osteoarthritis involving multiple joints    3. Dupuytren's contracture of both hands    4. Chronic kidney disease, stage 3          1. PMR-  mild recurrence of polymyalgia rheumatica after being off prednisone for 2 years- returns with will shoulder pain, gluteal pain, mild stiffness X 20 min, no synovitis- negative CCP-slight increase in sedimentation rate feeling much better back on prednisone 10 mg daily now with no symptoms to report    2. Trigger finger will middle fingers-resolved post tendon sheath injection    3. CKD and DM  not able to take nsaids    4. OA generalized    5. Dupuytren's contracture- early and mild will middle flexor tendon    Plan:           Today we'll check his labs CBC, CMP, sedimentation rate and CRP    Let him cut back on his prednisone to 2.5 mg at night and keep him at 5 mg in the morning  At next visit if he continues to  do well we'll try to get him back to 5 mg daily  Make keep him on a low dose for a little while, definitely try to get him below 6 mg    Continue to Try tummeric 500-1000 mg bid  Tylenol for pain , avoid nonsteroidals    At home exercise for dupuytren contracture, paraffin and range of motion exercises      rtc 8 weeks  with CBC, CMP, sedimentation rate and CRP

## 2017-06-07 ENCOUNTER — PATIENT MESSAGE (OUTPATIENT)
Dept: NEUROLOGY | Facility: CLINIC | Age: 77
End: 2017-06-07

## 2017-06-19 ENCOUNTER — PATIENT MESSAGE (OUTPATIENT)
Dept: NEUROLOGY | Facility: CLINIC | Age: 77
End: 2017-06-19

## 2017-07-28 DIAGNOSIS — M35.3 PMR (POLYMYALGIA RHEUMATICA): ICD-10-CM

## 2017-07-28 RX ORDER — PREDNISONE 5 MG/1
TABLET ORAL
Qty: 60 TABLET | Refills: 0 | Status: SHIPPED | OUTPATIENT
Start: 2017-07-28 | End: 2017-09-11 | Stop reason: SDUPTHER

## 2017-07-31 ENCOUNTER — LAB VISIT (OUTPATIENT)
Dept: LAB | Facility: HOSPITAL | Age: 77
End: 2017-07-31
Attending: INTERNAL MEDICINE
Payer: MEDICARE

## 2017-07-31 DIAGNOSIS — E11.22 TYPE 2 DIABETES MELLITUS WITH STAGE 3 CHRONIC KIDNEY DISEASE, UNSPECIFIED LONG TERM INSULIN USE STATUS: ICD-10-CM

## 2017-07-31 DIAGNOSIS — M35.3 PMR (POLYMYALGIA RHEUMATICA): ICD-10-CM

## 2017-07-31 DIAGNOSIS — M25.50 PAIN, JOINT, MULTIPLE SITES: ICD-10-CM

## 2017-07-31 DIAGNOSIS — N18.3 TYPE 2 DIABETES MELLITUS WITH STAGE 3 CHRONIC KIDNEY DISEASE, UNSPECIFIED LONG TERM INSULIN USE STATUS: ICD-10-CM

## 2017-07-31 DIAGNOSIS — N18.30 CHRONIC KIDNEY DISEASE, STAGE 3: ICD-10-CM

## 2017-07-31 LAB
ALBUMIN SERPL BCP-MCNC: 4.2 G/DL
ALP SERPL-CCNC: 47 U/L
ALT SERPL W/O P-5'-P-CCNC: 17 U/L
ANION GAP SERPL CALC-SCNC: 9 MMOL/L
AST SERPL-CCNC: 23 U/L
BASOPHILS # BLD AUTO: 0.02 K/UL
BASOPHILS NFR BLD: 0.3 %
BILIRUB SERPL-MCNC: 1 MG/DL
BUN SERPL-MCNC: 21 MG/DL
CALCIUM SERPL-MCNC: 9.7 MG/DL
CHLORIDE SERPL-SCNC: 104 MMOL/L
CO2 SERPL-SCNC: 26 MMOL/L
CREAT SERPL-MCNC: 1.2 MG/DL
CRP SERPL-MCNC: 0.9 MG/L
DIFFERENTIAL METHOD: ABNORMAL
EOSINOPHIL # BLD AUTO: 0.1 K/UL
EOSINOPHIL NFR BLD: 1.9 %
ERYTHROCYTE [DISTWIDTH] IN BLOOD BY AUTOMATED COUNT: 13.4 %
ERYTHROCYTE [SEDIMENTATION RATE] IN BLOOD BY WESTERGREN METHOD: 1 MM/HR
EST. GFR  (AFRICAN AMERICAN): >60 ML/MIN/1.73 M^2
EST. GFR  (NON AFRICAN AMERICAN): 58.4 ML/MIN/1.73 M^2
GLUCOSE SERPL-MCNC: 133 MG/DL
HCT VFR BLD AUTO: 39.2 %
HGB BLD-MCNC: 13.6 G/DL
LYMPHOCYTES # BLD AUTO: 1.7 K/UL
LYMPHOCYTES NFR BLD: 28.5 %
MCH RBC QN AUTO: 33.7 PG
MCHC RBC AUTO-ENTMCNC: 34.7 G/DL
MCV RBC AUTO: 97 FL
MONOCYTES # BLD AUTO: 0.6 K/UL
MONOCYTES NFR BLD: 9.8 %
NEUTROPHILS # BLD AUTO: 3.5 K/UL
NEUTROPHILS NFR BLD: 59.5 %
PLATELET # BLD AUTO: 220 K/UL
PMV BLD AUTO: 10.5 FL
POTASSIUM SERPL-SCNC: 4.2 MMOL/L
PROT SERPL-MCNC: 7.1 G/DL
RBC # BLD AUTO: 4.03 M/UL
SODIUM SERPL-SCNC: 139 MMOL/L
WBC # BLD AUTO: 5.82 K/UL

## 2017-07-31 PROCEDURE — 85651 RBC SED RATE NONAUTOMATED: CPT | Mod: PO

## 2017-07-31 PROCEDURE — 80053 COMPREHEN METABOLIC PANEL: CPT

## 2017-07-31 PROCEDURE — 36415 COLL VENOUS BLD VENIPUNCTURE: CPT | Mod: PO

## 2017-07-31 PROCEDURE — 86140 C-REACTIVE PROTEIN: CPT

## 2017-07-31 PROCEDURE — 85025 COMPLETE CBC W/AUTO DIFF WBC: CPT | Mod: PO

## 2017-08-03 ENCOUNTER — OFFICE VISIT (OUTPATIENT)
Dept: RHEUMATOLOGY | Facility: CLINIC | Age: 77
End: 2017-08-03
Payer: MEDICARE

## 2017-08-03 VITALS
DIASTOLIC BLOOD PRESSURE: 73 MMHG | HEART RATE: 69 BPM | SYSTOLIC BLOOD PRESSURE: 140 MMHG | WEIGHT: 176.56 LBS | BODY MASS INDEX: 21.95 KG/M2 | HEIGHT: 75 IN

## 2017-08-03 DIAGNOSIS — M35.3 PMR (POLYMYALGIA RHEUMATICA): Primary | ICD-10-CM

## 2017-08-03 DIAGNOSIS — M15.9 PRIMARY OSTEOARTHRITIS INVOLVING MULTIPLE JOINTS: ICD-10-CM

## 2017-08-03 DIAGNOSIS — M72.0 DUPUYTREN'S CONTRACTURE OF BOTH HANDS: ICD-10-CM

## 2017-08-03 PROCEDURE — 99999 PR PBB SHADOW E&M-EST. PATIENT-LVL III: CPT | Mod: PBBFAC,,, | Performed by: PHYSICIAN ASSISTANT

## 2017-08-03 PROCEDURE — 99214 OFFICE O/P EST MOD 30 MIN: CPT | Mod: S$GLB,,, | Performed by: PHYSICIAN ASSISTANT

## 2017-08-03 PROCEDURE — 1126F AMNT PAIN NOTED NONE PRSNT: CPT | Mod: S$GLB,,, | Performed by: PHYSICIAN ASSISTANT

## 2017-08-03 PROCEDURE — 1159F MED LIST DOCD IN RCRD: CPT | Mod: S$GLB,,, | Performed by: PHYSICIAN ASSISTANT

## 2017-08-03 PROCEDURE — 3008F BODY MASS INDEX DOCD: CPT | Mod: S$GLB,,, | Performed by: PHYSICIAN ASSISTANT

## 2017-08-03 RX ORDER — GUAIFENESIN AND PHENYLEPHRINE HCL 400; 10 MG/1; MG/1
TABLET ORAL
COMMUNITY
End: 2020-11-12

## 2017-08-03 RX ORDER — AMOXICILLIN 500 MG
2 CAPSULE ORAL DAILY
COMMUNITY

## 2017-08-03 NOTE — PROGRESS NOTES
Subjective:       Patient ID: Jagdeep Greenberg is a 76 y.o. male.    Chief Complaint: pmr; Osteoarthritis; Chronic Kidney Disease; and dupuytrens    Jagdeep is here for follow up. Treated for PMR in the past. Weaned off prednisone  Over 2 years now. No need to add dmard therapy. At last visit he had multiple issues. Increased joint pain and muscle aches. Stiffness awais hip and shoulder area. Esr was mildly elevated at 12. He felt like when dx with prm. Also trigger finger on both hands. We injected the tendon sheath and the trigger finger resolved. We added back prednisone 5 mg bid and he felt better within 2 days. Added turmeric 1500 mg daily is his dose. He is taking glucosamine and fish oil as well. Last visit doing well. We cut back prednisone to 2.5 mg in afternoon, taking 5 mg in am.  Continues to do well. Today  pain level 0/10.  No muscle aches or joint pain especially in his hips and shoulders.  No fevers, no weight loss.  Triggering resolved.   Unable to take nonsteroidals because of chronic kidney disease.  In the past uses Tylenol when necessary for pain.        Osteoarthritis   Pertinent negatives include no abdominal pain, arthralgias, chest pain, chills, congestion, coughing, fatigue, fever, joint swelling, myalgias, nausea, neck pain, numbness, rash, vomiting or weakness.       Review of Systems   Constitutional: Negative.  Negative for chills, fatigue and fever.   HENT: Negative.  Negative for congestion, mouth sores and trouble swallowing.    Eyes: Negative.  Negative for photophobia, redness and visual disturbance.   Respiratory: Negative.  Negative for cough, shortness of breath and wheezing.    Cardiovascular: Negative.  Negative for chest pain and leg swelling.   Gastrointestinal: Negative.  Negative for abdominal distention, abdominal pain, diarrhea, nausea and vomiting.   Genitourinary: Negative.  Negative for dysuria, flank pain, frequency and urgency.   Musculoskeletal: Negative for  "arthralgias, back pain, gait problem, joint swelling, myalgias, neck pain and neck stiffness.   Skin: Negative.  Negative for rash.   Neurological: Negative.  Negative for dizziness, weakness and numbness.         Objective:   BP (!) 140/73   Pulse 69   Ht 6' 3" (1.905 m)   Wt 80.1 kg (176 lb 9.4 oz)   BMI 22.07 kg/m²      Physical Exam   Constitutional: He is oriented to person, place, and time and well-developed, well-nourished, and in no distress. No distress.   HENT:   Head: Normocephalic and atraumatic.   Right Ear: External ear normal.   Left Ear: External ear normal.   Mouth/Throat: No oropharyngeal exudate.   Eyes: Conjunctivae and EOM are normal. Pupils are equal, round, and reactive to light. No scleral icterus.   Neck: Neck supple. No thyromegaly present.   Cardiovascular: Normal rate, regular rhythm and normal heart sounds.    No murmur heard.  Pulmonary/Chest: Effort normal and breath sounds normal. No respiratory distress.   Abdominal: Soft. Bowel sounds are normal.       Right Side Rheumatological Exam     Examination finds the shoulder, elbow, wrist, knee, 1st PIP, 1st MCP, 2nd PIP, 2nd MCP, 3rd PIP, 4th PIP, 4th MCP, 5th PIP and 5th MCP normal.    The patient is tender to palpation of the 3rd MCP    Left Side Rheumatological Exam     Examination finds the elbow, wrist, knee, 1st PIP, 1st MCP, 2nd PIP, 2nd MCP, 3rd PIP, 4th PIP, 4th MCP, 5th PIP and 5th MCP normal.    The patient is tender to palpation of the shoulder and 3rd MCP.      Lymphadenopathy:     He has no cervical adenopathy.   Neurological: He is alert and oriented to person, place, and time. No cranial nerve deficit. He exhibits normal muscle tone. Gait normal. Coordination normal.   Skin: Skin is warm and dry.     Musculoskeletal: Normal range of motion. He exhibits no edema.   No triggering today  He does have early Dupuytren's contracture bilateral third fingers  No synovitis on exam              Recent Results (from the past 168 " hour(s))   CBC auto differential    Collection Time: 07/31/17  9:48 AM   Result Value Ref Range    WBC 5.82 3.90 - 12.70 K/uL    RBC 4.03 (L) 4.60 - 6.20 M/uL    Hemoglobin 13.6 (L) 14.0 - 18.0 g/dL    Hematocrit 39.2 (L) 40.0 - 54.0 %    MCV 97 82 - 98 fL    MCH 33.7 (H) 27.0 - 31.0 pg    MCHC 34.7 32.0 - 36.0 g/dL    RDW 13.4 11.5 - 14.5 %    Platelets 220 150 - 350 K/uL    MPV 10.5 9.2 - 12.9 fL    Gran # 3.5 1.8 - 7.7 K/uL    Lymph # 1.7 1.0 - 4.8 K/uL    Mono # 0.6 0.3 - 1.0 K/uL    Eos # 0.1 0.0 - 0.5 K/uL    Baso # 0.02 0.00 - 0.20 K/uL    Gran% 59.5 38.0 - 73.0 %    Lymph% 28.5 18.0 - 48.0 %    Mono% 9.8 4.0 - 15.0 %    Eosinophil% 1.9 0.0 - 8.0 %    Basophil% 0.3 0.0 - 1.9 %    Differential Method Automated    Comprehensive metabolic panel    Collection Time: 07/31/17  9:48 AM   Result Value Ref Range    Sodium 139 136 - 145 mmol/L    Potassium 4.2 3.5 - 5.1 mmol/L    Chloride 104 95 - 110 mmol/L    CO2 26 23 - 29 mmol/L    Glucose 133 (H) 70 - 110 mg/dL    BUN, Bld 21 8 - 23 mg/dL    Creatinine 1.2 0.5 - 1.4 mg/dL    Calcium 9.7 8.7 - 10.5 mg/dL    Total Protein 7.1 6.0 - 8.4 g/dL    Albumin 4.2 3.5 - 5.2 g/dL    Total Bilirubin 1.0 0.1 - 1.0 mg/dL    Alkaline Phosphatase 47 (L) 55 - 135 U/L    AST 23 10 - 40 U/L    ALT 17 10 - 44 U/L    Anion Gap 9 8 - 16 mmol/L    eGFR if African American >60.0 >60 mL/min/1.73 m^2    eGFR if non  58.4 (A) >60 mL/min/1.73 m^2   C-reactive protein    Collection Time: 07/31/17  9:48 AM   Result Value Ref Range    CRP 0.9 0.0 - 8.2 mg/L   Sedimentation rate, manual    Collection Time: 07/31/17  9:48 AM   Result Value Ref Range    Sed Rate 1 0 - 10 mm/Hr              Assessment:       1. PMR (polymyalgia rheumatica)    2. Primary osteoarthritis involving multiple joints    3. Dupuytren's contracture of both hands          1. PMR-  mild recurrence of polymyalgia rheumatica after being off prednisone for 2 years- returns with will shoulder pain, gluteal pain,  mild stiffness X 20 min, no synovitis- negative CCP-slight increase in sedimentation rate- dong better  back on prednisone initially  10 mg daily now down to 7.5 mg daily.   Esr 1  no signs of activity     2. Trigger finger will middle fingers-resolved post tendon sheath injection    3. CKD and DM  not able to take nsaids    4. OA generalized    5. Dupuytren's contracture- early and mild will middle flexor tendon    Plan:           Drop prednisone to 5 mg in am  At next visit if doing well will go down to 4 mg then 3 mg etc and see if we can wean back off over next 6-8 months        Continue to Try tummeric 1500 mg  Continue glucosamine and fish oil  Tylenol for pain , avoid nonsteroidals    At home exercise for dupuytren contracture, paraffin and range of motion exercises      rtc 12 weeks  with CBC, CMP, sedimentation rate and CRP

## 2017-08-04 ENCOUNTER — INITIAL CONSULT (OUTPATIENT)
Dept: NEUROLOGY | Facility: CLINIC | Age: 77
End: 2017-08-04
Payer: MEDICARE

## 2017-08-04 DIAGNOSIS — F41.1 GENERALIZED ANXIETY DISORDER: ICD-10-CM

## 2017-08-04 DIAGNOSIS — R41.3 MEMORY CHANGES: ICD-10-CM

## 2017-08-04 DIAGNOSIS — G31.84 MILD NEUROCOGNITIVE DISORDER: ICD-10-CM

## 2017-08-04 PROCEDURE — 96119 PR NEUROPSYCH TESTING BY TECHNICIAN: CPT | Mod: 59,S$GLB,, | Performed by: CLINICAL NEUROPSYCHOLOGIST

## 2017-08-04 PROCEDURE — 96118 PR NEUROPSYCH TESTING BY PSYCH/PHYS: CPT | Mod: S$GLB,,, | Performed by: CLINICAL NEUROPSYCHOLOGIST

## 2017-08-04 PROCEDURE — 90791 PSYCH DIAGNOSTIC EVALUATION: CPT | Mod: S$GLB,,, | Performed by: CLINICAL NEUROPSYCHOLOGIST

## 2017-08-04 PROCEDURE — 99499 UNLISTED E&M SERVICE: CPT | Mod: S$GLB,,, | Performed by: CLINICAL NEUROPSYCHOLOGIST

## 2017-08-04 NOTE — LETTER
August 4, 2017        Ernie Harden MD  [address]  [address]  [facility] [address]             [facility]  [address]  [place] [address]  Phone: [phone]   Patient: Jagdeep Greenberg   MR Number: [id number]   YOB: 1940   Date of Visit: 8/4/2017       Dear Dr. Harden:    Thank you for referring Jagdeep Greenberg to me for evaluation. Below are the relevant portions of my assessment and plan of care.            If you have questions, please do not hesitate to call me. I look forward to following Jagdeep along with you.    Sincerely,      OLIVE Contreras II, PhD           CC  Panda Contreras MD

## 2017-08-04 NOTE — PROGRESS NOTES
"Outpatient Neuropsychological Evaluation    Referral Information  Name: Jagdeep Greenberg  MRN: [id number]  ANDRE: 2017  : 1940  Age: 76 y.o.  Handedness: Right  Race: White  Gender: male  Referring Provider: Ernie Harden Md  [address]  [address]  [place] [address]  Referral Reason/Medical Necessity: Patient has vascular risk factors and possibly Parkinson's symptoms. There is concern about his cognition (Recent MoCA=) and he was referred for a neuropsychological evaluation by Neurology to characterize his cognitive status, for differential diagnosis, and for treatment recommendations.   Billing:Total licensed neuropsychologists professional time includes: clinical interview (46635: 60-minutes), test administration and interpretation of tests administered by the billing neuropsychologist, integration of test results and other clinical data, preparing the final report, and personally reporting results to the patient (04435)= 2 hours. Total technician time (19748) = 2 hours.   Consent: The patient expressed an understanding of the purpose of the evaluation and consented to all procedures.    Current Symptoms/HPI  Cognitive Sxs:  · Attention:   · Per Pt: Described himself as having lifelong problems with attention/focus/distractibility.   · Per Wife: Wife agreed with the above.   · Mental Speed: No difficulties or changes  · Memory:  · Per Pt: No changes/problems  · Per Wife: No changes/problems  · Language: No changes/problems  · Visuospatial/Perceptual: No changes/problems  · Executive Functioning: No changes/problems    [Onset/Course]: Despite concern noted in record for some aspects of cognition, the patient/wife report no major symptoms aside from lifelong trouble with attention/focus.     Neuropsychiatric Sxs:  · Mood:   · Depression: "Upbeat"  · Anxiety: None reported  · Irritability: Great sense of humor  · Neurovegetative:  · Sleep: If he doesn't nap, then he has no difficulty " "falling asleep. He wakes up a lot at night to urinate.   · Appetite: Good  · Energy: Good  · Behavioral Concerns: None  · Delusions: Denied  · Hallucinations: Denied  · SI/HI: Denied    Physical  · Motor: Shuffling gait, slower walking, a little off balance which all developed in the past year or so. Some of these sxs have improved recently both attributed to Sinemet and recent arthritis improvement on Prednisone.  · Sensory: Hyposmia starting a few years ago;   · Pain: None today    Current Functioning (I/ADLs):  · ADLs: Independent  · IADLs: Mostly independent, but wife has managed many asks for most of their life.  · Finances: Wife manages and this is longstanding  · Medication Mgmt: Wife sets up pillbox and he takes on his own  · Driving: No problems  · Household Mgmt: Handles repairs and maintenance    Family History   Problem Relation Age of Onset    Leukemia Father 68     Family Neurologic History: Negative for heritable risk factors  Family Psychiatric History: Negative for heritable risk factors    Developmental/Academic Hx:  Developmental: No gestational or later developmental concerns.  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 [employer]  · Stressors: None    Occupational Hx:  · Occupational Status: Retired mostly, but does some part-time work  · 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    MEDICAL HISTORY  Patient Active Problem List   Diagnosis    Hypertension associated with diabetes    " Combined hyperlipidemia associated with type 2 diabetes mellitus    Type 2 diabetes mellitus with diabetic neuropathy, without long-term current use of insulin    History of prostate cancer    H/O adenomatous polyp of colon    Carotid artery plaque    PMR (polymyalgia rheumatica)    History of colon polyps    Colon polyps    Diverticulosis of large intestine without hemorrhage    Primary osteoarthritis involving multiple joints    Chronic kidney disease, stage 3    Type 2 diabetes mellitus with stage 3 chronic kidney disease    Parkinsonism    Memory changes    Chronic midline low back pain without sciatica    Chronic gluteal pain    Chronic left shoulder pain    Dupuytren's contracture of both hands    Primary osteoarthritis, left shoulder     Past Medical History:   Diagnosis Date    Adenomatous polyp of colon     Arthritis     Carotid artery plaque     Less than 50%    Chronic kidney disease, stage 3     Diabetes mellitus, type 2     Erectile dysfunction following radical prostatectomy     Fractures     Hyperlipidemia LDL goal < 100     Hypertension goal BP (blood pressure) < 130/80     Prostate cancer 2001     Past Surgical History:   Procedure Laterality Date    APPENDECTOMY      COLONOSCOPY  In 3/2/2010    Repeat 5 years    COLONOSCOPY N/A 9/29/2015    Procedure: COLONOSCOPY;  Surgeon: Erik Brandt MD;  Location: [facility] ENDO;  Service: Endoscopy;  Laterality: N/A;    COLONOSCOPY N/A 10/11/2016    Procedure: COLONOSCOPY;  Surgeon: Erik Brandt MD;  Location: [facility] ENDO;  Service: Endoscopy;  Laterality: N/A;    FRACTURE SURGERY      HERNIA REPAIR      LYMPH NODE DISSECTION      Pelvic    PROSTATECTOMY      SHOULDER ARTHROSCOPY      Right     Neurologic History  · TBI: None  · Seizures: None  · Stroke: Possible 11/2015  · Movement Disorder: Being evaluated for Parkinsonism currently by Dr. Harden    Lab Results   Component Value Date    [id number] 309 03/27/2017     Lab  Results   Component Value Date    RPR Non-reactive 03/27/2017     Lab Results   Component Value Date    FOLATE 16.2 08/14/2014     Lab Results   Component Value Date    TSH 1.061 03/27/2017     Lab Results   Component Value Date    HGBA1C 7.6 (H) 05/26/2017     No results found for: HIV1X2, [id number]    Neurodiagnostics  Brain MRI on 04/18/17  Findings: Diffuse parenchymal atrophy noted, with mild changes of small vessel disease. Remote lacunar infarct noted in the left centrum semiovale. No restricted diffusion. No hemorrhage or mass displacement. No abnormal extra-axial fluid collections are identified.    Flow-voids are noted in all major intracranial vessels. Paranasal sinuses and mastoids are clear.    Marrow signal throughout the calvarium is normal. No structural anomalies are apparent.    Post-contrast images reveal no pathologic meningeal or parenchymal enhancement.   Impression      No acute intracranial process noted.      Electronically signed by: BELÉN BURTON MD  Date: 04/18/17         Current Outpatient Prescriptions:     amlodipine (NORVASC) 10 MG tablet, TAKE 1 TABLET BY MOUTH EVERY DAY, Disp: 90 tablet, Rfl: 3    aspirin (ECOTRIN) 81 MG EC tablet, Take 1 tablet (81 mg total) by mouth once daily., Disp: , Rfl: 0    benazepril-hydrochlorthiazide (LOTENSIN HCT) 20-12.5 mg per tablet, TAKE 1 TABLET BY MOUTH EVERY DAY, Disp: 90 tablet, Rfl: 3    blood sugar diagnostic (GLUCOSE BLOOD) Strp, Test glucose 1 x daily, Disp: 35 strip, Rfl: prn    carbidopa-levodopa  mg (SINEMET)  mg per tablet, 0.5 tablet QD for 1 day, then 0.5 tablet BID for 1 day, then 0.5 tablet TID and continue, Disp: 45 tablet, Rfl: 11    fish oil-omega-3 fatty acids 300-1,000 mg capsule, Take 2 g by mouth once daily., Disp: , Rfl:     glimepiride (AMARYL) 2 MG tablet, Take 1 tablet (2 mg total) by mouth once daily at 6am., Disp: 90 tablet, Rfl: 1    GLUC.METER,DIS.P-LOADED STRIPS (GLUCOSE METER, DISP & STRIPS) Kit,  "Check glucose once per day, Disp: 1 kit, Rfl: prn    GLUCOSAMINE HCL (GLUCOSAMINE, BULK, MISC), by Misc.(Non-Drug; Combo Route) route., Disp: , Rfl:     L. RHAMNOSUS GG/INULIN (CULTURELLE PROBIOTICS ORAL), Take by mouth., Disp: , Rfl:     lancets Misc, As directed, Disp: 100 each, Rfl: prn    MEN'S MULTI-VITAMIN ORAL, Take by mouth., Disp: , Rfl:     metformin (GLUCOPHAGE) 1000 MG tablet, TAKE 1 TABLET BY MOUTH DAILY WITH BREAKFAST, Disp: 90 tablet, Rfl: 3    pravastatin (PRAVACHOL) 20 MG tablet, TAKE 1 TABLET BY MOUTH DAILY, Disp: 90 tablet, Rfl: 3    predniSONE (DELTASONE) 5 MG tablet, TAKE 1 TABLET(5 MG) BY MOUTH TWICE DAILY, Disp: 60 tablet, Rfl: 0    turmeric root extract 500 mg Cap, Take by mouth., Disp: , Rfl:     Psychiatric Hx:  · Childhood: None  · Adult: None  · Substance Use: Drinks one-beer and 3 high balls daily; he does feel like alcohol affects him a little more.     Social History     Social History Main Topics    Smoking status: Never Smoker    Smokeless tobacco: Never Used    Alcohol use 21.0 oz/week     14 Cans of beer, 21 Shots of liquor per week    Drug use: Unknown    Sexual activity: Not on file       MENTAL STATUS AND OBSERVATIONS:  APPEARANCE: Casually dressed and adequate grooming/hygiene.   ALERTNESS/ORIENTATION: Attentive and alert. Fully oriented (x5) to time and place  GAIT: Slow, but otherwise unremarkable  MOTOR MOVEMENTS/MANNERISMS: Unremarkable  SPEECH/LANGUAGE: Normal in rate, rhythm, tone, and volume. No significant word finding difficulty noted. Expressive and receptive language was normal.  STATED MOOD/AFFECT: The patients stated mood was "good." Affect was congruent with stated mood.   INTERPERSONAL BEHAVIOR: Rapport was quickly and easily established   SUICIDALITY/HOMICIDALITY: Denied  HALLUCINATIONS/DELUSIONS: None evidenced or endorsed  THOUGHT PROCESSES: Thoughts seemed logical and goal-directed.   TEST TAKING BEHAVIOR and VALIDITY: Embedded performance " validity measures and observation of effort were suggestive of adequate engagement. The current results, therefore, are likely a valid reflection of the patient's current functioning.     PROCEDURES/TESTS ADMINISTERED:  In addition to performing a review of pertinent medical records, reviewing limits to confidentiality, conducting a clinical interview (with patient and his wife), and explaining procedures, the following measures were administered: Mini-Mental State Examination (MMSE; Leeds et al., 1993 norms); Wide Range Achievement Test - Fourth Edition (WRAT-IV; Green Form) Reading Test; Wechsler Adult Intelligence Scale-IV (Digit Span); Trail Making Test, parts A and B (Sawyer et al., 2004 norms); [place] Naming Test (BNT-60; Sawyer et al., 2004 norms) Category and Letter-cued verbal fluency (animal naming/FAS; Sawyer et al., 2004 norms); Diaz Verbal Learning Test - Revised (Form-1); Brief Visuospatial Memory Test- Revised (BVMT); WMS-IV Logical Memory (Older Adult Battery); Clock Drawing; SDMT; [place] Card Sorting Test-64; Grooved Pegboard Test (Sawyer et al., 2004 norms); Geriatric Depression Scale (GDS-30); CECILLE-7. Manual norms were used unless otherwise indicated.      TEST RESULTS  Percentile Interpretation:        </=3rd......................................Abnormal        4th-9th.....................................Borderline Abnormal        10th-24th...................................Low Average        25th-74th...................................Average        75th-90th...................................High Average        91st-97th...................................Superior        >/=97th.....................................Very Superior        SCREENING OF GENERAL COGNITIVE FUNCTIONING:    MMSE (total score/%ile):     Total Score (max = 30)...............28 / WNL  Orientation to time..................5 / 5  Orientation to place.................5 / 5       ESTIMATED FSIQ and READING LEVEL:      WRAT-4  (Raw/SS/%ile)..................57 / 101 / 53rd        AUDITORY ATTENTION AND WORKING MEMORY    XFFH-LF-Accwk Span        Forward raw..........................7 / 16th      Forward span.........................5 /       Backward raw.........................6 / 25th      Backward span........................3 /       Sequencing raw.......................2 / 2nd      Sequencing span......................3 /       Overall (SS/percentile)..............5 / 5th      MOTOR AND ORAL PROCESSING SPEED     Trail Making Test (sec. to completion/percentile):        Part A .....................................65 / 3rd          Errors..................................0 /      SDMT (total correct/percentile):        Oral Form...................................32 / 6th      Written Form................................30 / 12th      MOTOR FUNCTIONS    Grooved Pegboard (sec. to completion/%ile)        Dominant (Right) Hand.......................102 / 31st      Non-dominant (Left) Hand....................139 / 10th    LANGUAGE FUNCTIONING    WORD PRODUCTIVITY    Verbal Fluency Tests (raw/percentiles):        FAS.........................................34 / 34th      Animals.....................................15 / 27th      CONFRONTATION NAMING    Brooks Naming Test (raw/percentile)        Total Spontaneous (max. = 60)...............47/ 18th      CONSTRUCTIONAL PRAXIS     Clock Drawing:        Request (max. = 5)...........................3 / Abnormal      Copy (max. = 5)..............................5 / WNL    BVMT-R-Copy (max. = 12)..........................9 /        NONVERBAL LEARNING AND MEMORY    Brief Visuospatial Memory Test - Revised (raw score/percentile):        Form: 1        Trial 1......................................3 /       Trial 2......................................6 /       Trial 3......................................6 /       Total Recall.................................15 / 18th      Delayed  Recall...............................7 / 38th      Recognition:            Hits.....................................6 /           False-Positives..........................0 /           Discrimination Index.....................6 /         VERBAL LEARNING AND MEMORY OF PROSE PASSAGES    WMS-IV (raw score/percentile):        Logical Memory I.............................26 / 37th      Logical Memory II............................20 / 63rd      Recognition..................................19 / 51-75%      VERBAL LEARNING AND MEMORY OF A WORDLIST    Diaz Verbal Learning Test - Revised (raw score/percentile):        Form: 1        Trial 1......................................3 /       Trial 2......................................5 /       Trial 3......................................6 /       Total Recall.................................14 / 4th      Delayed Recall...............................5 / 12th      Recognition:            Hits.....................................9 /           False-Positives..........................1 /           Discrimination Index.....................8 / 10th      EXECUTIVE FUNCTIONING    PROBLEM SOLVING AND REASONING  WCST-64 (raw/%ile):        Categories Completed.........................2 / 11-16th      Total Errors.................................28 / 13th      Perseverative Errors.........................16 / 10th      Perseverative Responses......................16 / 13th      Trials to 1st................................11 / >16%      Failure to Maintain Set......................1 /           SET-SHIFTING  Trail Making Test (sec. to completion/%ile):        Part B ......................................144 / 16th          Errors...................................1 /         SELF-REPORTED MOOD  CECILLE-7...........................................9 / Mild gen anxiety  GDS.............................................7 / No major depressive sxs.     OVERALL SUMMARY  Patient has vascular risk  factors and possibly Parkinson's symptoms. There is concern about his cognition (Recent MoCA=17/30) and he was referred for a neuropsychological evaluation by Neurology to characterize his cognitive status, for differential diagnosis, and for treatment recommendations. The patient's baseline or pre-morbid intellectual functioning was estimated to be in the average range based on educational/occupational history and performance on a word reading measure. Results should be interpreted in that context.    Global mental status was largely normal (MMSE=28/30). Basic attention was normal, but more complex working memory was variable. Mental speed was largely below expectations. Motor speed was normal for his dominant right hand and borderline/low-average for his non-dominant left hand. Expressive/receptive language was normal on observation. Object naming was below expectations. Verbal fluency was normal and evenly developed for phonemic and semantic conditions. Complex constructional praxis showed some difficulty with planning/organization when drawing a clock. His clock copy, however, was normal.     Learning and memory assessment revealed important findings. Verbal learning and memory was normal when information was provided in context and with some structure. Without structure or with increased executive functioning demands, his verbal learning declined quite a bit. Fortunately, his retained most of what he previously learned on both measures. Recognition memory was somewhat helpful. Nonverbal learning was below average, but, again, he retained the information that he previously learned. Executive functions were largely below average. Problem solving and conceptual reasoning were below average as was his set-shifting. Planning/organization when drawing a clock was below expectations. However, verbal fluency was normal range.    Psychiatrically, he denied any major problems on interview. On testing, he endorsed some  generalized anxiety/nervousness/worry that may need to be addressed. He also is a heavy drinker.    Overall, the patient has a Mild Neurocognitive Disorder. His pattern of cognitive dysfunction showed mostly subcortical dysfunction (e.g., slowed speed, executive dysfunction, inefficient learning of new information likely 2/2 attention and speed difficulties). Etiology is unclear at this time. First, he has a number of vascular risk factors (e.g., T2DM, HTN, HLD, and CKD-3). Second, he has some motor-related changes that have apparently been responding to Sinemet. Thus, parkinson's could be contributory. Third, he has some anxiety and has a pattern of heavier than normal drinking for many years (4-5 drinks nightly). Follow-up testing in 12-months and follow-up Neurology visits will help to better refine differential diagnosis.  Additionally, he may experience some improvement with improved anxiety and reduced alcohol intake.     Referral Dx:  R41.3 (ICD-10-CM) - Memory changes  Consult Dx:  Mild Neurocognitive Disorder, Unspecified  R/O Alcohol Use Disorder  R/O Generalized Anxiety Disorder    OLIVE Contreras II, Ph.D.  Clinical Neuropsychologist  [facility] - Department of Neurology    RECOMMENDATIONS/TREATMENT PLAN  1. Follow Up Recommendations:  a. Neurology Follow-up: Continued Neurology follow-up as recommended by Mr. Waite neurologist.   b. Mental Health Follow-up: Will discuss patient's reported anxiety and alcohol use. Improved anxiety and reduced alcohol intake may lead to improved aspects of his thinking skills.   c. Medical Follow-up: Continued Primary Care follow-up as recommended by Mr. Waite treatment providers. Will address the importance of optimizing vascular health for overall brain health.  d. Neuropsychology: Neuropsychological reevaluation is recommended in 12-months following implementation of recommendations.    2. Recommendations for Mr. Greenberg:  a. Practice good cognitive  hygiene:  i. Engage in regular exercise, which increases alertness and arousal and can improve attention and focus.  Consider lower impact exercises, such as yoga or light walking.  ii. Get a good nights sleep, as this can enhance alertness and cognition.  iii. Eat healthy foods and balanced meals. It is notable that research indicates certain nutrients may aid in brain function, such as B vitamins (especially B6, B12, and folic acid), antioxidants (such as vitamins C and E, and beta carotene), and Omega-3 fatty acids. Talk with your physician or nutritionist about whats right for you.   iv. Keep your brain active. Find activities to stay mentally active, such as reading, games (cards, checkers), puzzles (crosswords, Sudoku, jig saw), crafts (models, woodworking), gardening, or participating in activities in the community.  v. Stay socially engaged. Continue staying active with your family and friends.

## 2017-08-09 ENCOUNTER — PATIENT MESSAGE (OUTPATIENT)
Dept: NEUROLOGY | Facility: CLINIC | Age: 77
End: 2017-08-09

## 2017-08-15 ENCOUNTER — TELEPHONE (OUTPATIENT)
Dept: NEUROLOGY | Facility: CLINIC | Age: 77
End: 2017-08-15

## 2017-08-15 NOTE — TELEPHONE ENCOUNTER
Call returned/Spoke with Mrs. Greenberg-    She informed me that she spoke with someone already and confirmed an appt for 10am Thursday.

## 2017-08-15 NOTE — TELEPHONE ENCOUNTER
----- Message from Elisha Sarmiento sent at 8/15/2017  1:20 PM CDT -----  Contact: [phone]/ self   Patient called in returning your call. Please advise.

## 2017-08-16 ENCOUNTER — DOCUMENTATION ONLY (OUTPATIENT)
Dept: NEUROLOGY | Facility: CLINIC | Age: 77
End: 2017-08-16

## 2017-08-16 NOTE — PROGRESS NOTES
Neuropsychology Feedback Note    Date of Session: 08/16/2017  Session Content: Results and recommendations were discussed at length with the patient and his wife. Reviewed implications of results, specific recommendations to address cognitive concerns (e.g., reduced drinking, vascular health, overall brain health), and cognitive strategies. No further neuropsychology feedback needed. They will follow-up with Dr. Harden. Of note, patient reported no issues with anxiety now and does not wish to have that addressed.

## 2017-08-17 ENCOUNTER — OFFICE VISIT (OUTPATIENT)
Dept: NEUROLOGY | Facility: CLINIC | Age: 77
End: 2017-08-17
Payer: MEDICARE

## 2017-08-17 VITALS
WEIGHT: 181.19 LBS | SYSTOLIC BLOOD PRESSURE: 151 MMHG | BODY MASS INDEX: 22.53 KG/M2 | HEART RATE: 74 BPM | DIASTOLIC BLOOD PRESSURE: 68 MMHG | HEIGHT: 75 IN

## 2017-08-17 DIAGNOSIS — G20.C PARKINSONISM, UNSPECIFIED PARKINSONISM TYPE: ICD-10-CM

## 2017-08-17 DIAGNOSIS — R41.3 MEMORY CHANGES: ICD-10-CM

## 2017-08-17 DIAGNOSIS — I63.81 LACUNAR STROKE: ICD-10-CM

## 2017-08-17 PROCEDURE — 99215 OFFICE O/P EST HI 40 MIN: CPT | Mod: S$GLB,,, | Performed by: PSYCHIATRY & NEUROLOGY

## 2017-08-17 PROCEDURE — 3008F BODY MASS INDEX DOCD: CPT | Mod: S$GLB,,, | Performed by: PSYCHIATRY & NEUROLOGY

## 2017-08-17 PROCEDURE — 1159F MED LIST DOCD IN RCRD: CPT | Mod: S$GLB,,, | Performed by: PSYCHIATRY & NEUROLOGY

## 2017-08-17 PROCEDURE — 3077F SYST BP >= 140 MM HG: CPT | Mod: S$GLB,,, | Performed by: PSYCHIATRY & NEUROLOGY

## 2017-08-17 PROCEDURE — 1126F AMNT PAIN NOTED NONE PRSNT: CPT | Mod: S$GLB,,, | Performed by: PSYCHIATRY & NEUROLOGY

## 2017-08-17 PROCEDURE — 99999 PR PBB SHADOW E&M-EST. PATIENT-LVL III: CPT | Mod: PBBFAC,,, | Performed by: PSYCHIATRY & NEUROLOGY

## 2017-08-17 PROCEDURE — 3078F DIAST BP <80 MM HG: CPT | Mod: S$GLB,,, | Performed by: PSYCHIATRY & NEUROLOGY

## 2017-08-17 NOTE — ASSESSMENT & PLAN NOTE
-- MRI personally reviewed, L centrum semiovale stroke  -- continue ASA 81 mg daily  --reviewed carotid dopplers, L ICA with 50-69% stenosis  -- reviewed A1C 7.6, LDL 88.8, TSH WNL  -- patient does not smoke  -- stroke education provided

## 2017-08-17 NOTE — PROGRESS NOTES
[facility] - NEUROLOGY EPILEPSY  [facility] [place]    Date: August 17, 2017   Patient Name: Jagdeep Greenberg   MRN: [id number]   PCP: Panda Contreras  Referring Provider: Self, Aaareferral    Assessmentn and Plan:   Jagdeep Greenberg is a 76 y.o. male presenting for follow-up of memory changes ultimately found to be consistent with mild cognitive impairment.  I personally reviewed the patient's MRI brain which was notable for a remote L centrum semiovale lacunar stroke and mild chronic ischemic changes. Risk stratification was performed as below.  With regard to his parkinsonism, he has had a symptomatic improvement with initiation of Sinemet.  Will continue one half tab of Sinemet 3 times a day.    Problem List Items Addressed This Visit        Neuro    Parkinsonism    Current Assessment & Plan     -- continue sinemet  1/2 tablet TID         Memory changes    Current Assessment & Plan     -- reviewed neuropsych testing with patient, consistent with MCI, will continue to follow clinically  -- alcohol use addressed with patient         Lacunar stroke    Current Assessment & Plan     -- MRI personally reviewed, L centrum semiovale stroke  -- continue ASA 81 mg daily  --reviewed carotid dopplers, L ICA with 50-69% stenosis  -- reviewed A1C 7.6, LDL 88.8, TSH WNL  -- patient does not smoke  -- stroke education provided             Other Visit Diagnoses    None.       Ernie Harden MD  [facility]   Department of Neurology    Patient note was created using Dragon Dictation.  Any errors in syntax or even information may not have been identified and edited on initial review prior to signing this note.  Subjective:   HPI:   Mr. Jagdeep Greenberg is a 76 y.o. male who presents in follow-up for memory changes and mild parkinsonism.  The patient presents again today with his wife who contributes to the history.  The patient reports that since his last visit, he has noted  a marked improvement in the fluidity of his gait and near absence of festination while taking Sinemet.  He does report that he had been taking it once every 8 hours and had been waking to take it in the middle of the night, leading him to notice a prominent wearing off effect.  He is amenable to switching his dosing to 3 times a day dosing while awake and expressed understanding of this.  He did complete a full neuropsychiatric evaluation with Dr. Contreras who noted MCI and discovered that the patient has been drinking somewhat heavily.  He admits to drinking 4-5 alcoholic beverages per night.  We discussed this at length during his visit and he is amenable to cutting back to no more than 2 beverages per night.    PAST MEDICAL HISTORY:  Past Medical History:   Diagnosis Date    Adenomatous polyp of colon     Arthritis     Carotid artery plaque     Less than 50%    Chronic kidney disease, stage 3     Diabetes mellitus, type 2     Erectile dysfunction following radical prostatectomy     Fractures     Hyperlipidemia LDL goal < 100     Hypertension goal BP (blood pressure) < 130/80     Prostate cancer 2001     PAST SURGICAL HISTORY:  Past Surgical History:   Procedure Laterality Date    APPENDECTOMY      COLONOSCOPY  In 3/2/2010    Repeat 5 years    COLONOSCOPY N/A 9/29/2015    Procedure: COLONOSCOPY;  Surgeon: Erik Brandt MD;  Location: [facility];  Service: Endoscopy;  Laterality: N/A;    COLONOSCOPY N/A 10/11/2016    Procedure: COLONOSCOPY;  Surgeon: Erik Brandt MD;  Location: [facility];  Service: Endoscopy;  Laterality: N/A;    FRACTURE SURGERY      HERNIA REPAIR      LYMPH NODE DISSECTION      Pelvic    PROSTATECTOMY      SHOULDER ARTHROSCOPY      Right     CURRENT MEDS:  Current Outpatient Prescriptions   Medication Sig Dispense Refill    amlodipine (NORVASC) 10 MG tablet TAKE 1 TABLET BY MOUTH EVERY DAY 90 tablet 3    aspirin (ECOTRIN) 81 MG EC tablet Take 1 tablet (81 mg total) by  "mouth once daily.  0    benazepril-hydrochlorthiazide (LOTENSIN HCT) 20-12.5 mg per tablet TAKE 1 TABLET BY MOUTH EVERY DAY 90 tablet 3    blood sugar diagnostic (GLUCOSE BLOOD) Strp Test glucose 1 x daily 35 strip prn    carbidopa-levodopa  mg (SINEMET)  mg per tablet 0.5 tablet QD for 1 day, then 0.5 tablet BID for 1 day, then 0.5 tablet TID and continue 45 tablet 11    fish oil-omega-3 fatty acids 300-1,000 mg capsule Take 2 g by mouth once daily.      glimepiride (AMARYL) 2 MG tablet Take 1 tablet (2 mg total) by mouth once daily at 6am. 90 tablet 1    GLUC.METER,DIS.P-LOADED STRIPS (GLUCOSE METER, DISP & STRIPS) Kit Check glucose once per day 1 kit prn    GLUCOSAMINE HCL (GLUCOSAMINE, BULK, MISC) by Misc.(Non-Drug; Combo Route) route.      L. RHAMNOSUS GG/INULIN (CULTURELLE PROBIOTICS ORAL) Take by mouth.      lancets Misc As directed 100 each prn    MEN'S MULTI-VITAMIN ORAL Take by mouth.      metformin (GLUCOPHAGE) 1000 MG tablet TAKE 1 TABLET BY MOUTH DAILY WITH BREAKFAST 90 tablet 3    pravastatin (PRAVACHOL) 20 MG tablet TAKE 1 TABLET BY MOUTH DAILY 90 tablet 3    predniSONE (DELTASONE) 5 MG tablet TAKE 1 TABLET(5 MG) BY MOUTH TWICE DAILY 60 tablet 0    turmeric root extract 500 mg Cap Take by mouth.       No current facility-administered medications for this visit.      ALLERGIES:  Review of patient's allergies indicates:  No Known Allergies    FAMILY HISTORY:  Family History   Problem Relation Age of Onset    Leukemia Father 68     SOCIAL HISTORY:  Social History   Substance Use Topics    Smoking status: Never Smoker    Smokeless tobacco: Never Used    Alcohol use 21.0 oz/week     14 Cans of beer, 21 Shots of liquor per week     Review of Systems:  12 review of systems is negative except for the symptoms mentioned in HPI.      Objective:     Vitals:    08/17/17 0948   BP: (!) 151/68   Pulse: 74   Weight: 82.2 kg (181 lb 3.5 oz)   Height: 6' 3" (1.905 m)     General: NAD, " well nourished   Eyes: no tearing, discharge, no erythema   ENT: moist mucous membranes of the oral cavity, nares patent    Neck: Supple, full range of motion  Cardiovascular: Warm and well perfused, pulses equal and symmetrical  Lungs: Normal work of breathing, normal chest wall excursions  Skin: No rash, lesions, or breakdown on exposed skin  Psychiatry: Mood and affect are appropriate   Abdomen: soft, non tender, non distended  Extremeties: No cyanosis, clubbing or edema.    Neurological   MENTAL STATUS: Alert and oriented to person, place, and time. Attention and concentration within normal limits. Speech without dysarthria, able to name and repeat with moderate Recent and remote memory fair to poor   CRANIAL NERVES: Visual fields intact. PERRL. EOMI with slow saccades. Facial sensation intact. Face symmetrical with hypomimia. Hearing grossly intact. Full shoulder shrug bilaterally. Tongue protrudes midline   SENSORY: Sensation is intact to light touch throughout.    MOTOR: Normal bulk and mildly increased tone in RUE>LUE. No resting tremor noted.  No pronator drift.  5/5 deltoid, biceps, triceps, interosseous, hand  bilaterally. 5/5 iliopsoas, knee extension/flexion, foot dorsi/plantarflexion bilaterally.    REFLEXES: Symmetric and 2+ throughout.   CEREBELLAR/COORDINATION/GAIT: Gait with normal stride and slightly reduced arm swing R>L. Negative pull back test.  Heel to shin intact. Finger to nose intact. Normal rapid alternating movements.     MOCA Results 17:   Visuospatial/Executive: 3/5  Namin/3  Attention:   Language: 1/3  Abstraction: 12  Delayed Recall: 05  Orientation:   Total:

## 2017-08-17 NOTE — ASSESSMENT & PLAN NOTE
-- reviewed neuropsych testing with patient, consistent with MCI, will continue to follow clinically  -- alcohol use addressed with patient

## 2017-08-17 NOTE — PATIENT INSTRUCTIONS
Addiction: Ask Yourself These Questions  Ask yourself the following questions. The answers can help you see where you might have problems caused by substance abuse. Then you can decide whether youre ready to do something about your use.  Yes No    ? ? Do you ever have trouble remembering things or concentrating on what youre doing because youre thinking about using?   ? ? Have you ever broken promises because of your substance use?   ? ? Have you ever done things when you were using that you wouldnt normally do?   ? ? Have you ever lost a job or had money troubles because of your use?   ? ? Do you ever make excuses for your use or lie to hide it?   ? ? Does someone make excuses for you when youre hung over?   ? ? Do others ever seem embarrassed about your use?   ? ? Have your children ever asked you to stop using or been afraid of you when you are using?   ? ? Has your spouse or a girlfriend or boyfriend ever left you because of your use?   ? ? Have you ever had sexual problems that might be caused by your use?   ? ? Have you ever had severe shaking, heard voices, or thought you were seeing things after youve been using?   ? ? Have you noticed that youve gotten sick more often as you use more?   ? ? Have you tried to quit but found you just couldnt?   If you answer Yes to one or more of these questions, you may need to change or stop your substance use.   Date Last Reviewed: 6/1/2016  © 8011-6786 [employer] [address] [place] [address], [place] [address]. All rights reserved. This information is not intended as a substitute for professional medical care. Always follow your healthcare professional's instructions.

## 2017-08-29 ENCOUNTER — LAB VISIT (OUTPATIENT)
Dept: LAB | Facility: HOSPITAL | Age: 77
End: 2017-08-29
Attending: FAMILY MEDICINE
Payer: MEDICARE

## 2017-08-29 DIAGNOSIS — E11.9 TYPE 2 DIABETES MELLITUS WITHOUT COMPLICATION, WITHOUT LONG-TERM CURRENT USE OF INSULIN: ICD-10-CM

## 2017-08-29 LAB
ESTIMATED AVG GLUCOSE: 131 MG/DL
HBA1C MFR BLD HPLC: 6.2 %

## 2017-08-29 PROCEDURE — 36415 COLL VENOUS BLD VENIPUNCTURE: CPT | Mod: PO

## 2017-08-29 PROCEDURE — 83036 HEMOGLOBIN GLYCOSYLATED A1C: CPT

## 2017-09-09 PROBLEM — R41.3 MEMORY CHANGES: Chronic | Status: ACTIVE | Noted: 2017-04-11

## 2017-09-11 DIAGNOSIS — M35.3 PMR (POLYMYALGIA RHEUMATICA): ICD-10-CM

## 2017-09-11 RX ORDER — PREDNISONE 5 MG/1
TABLET ORAL
Qty: 60 TABLET | Refills: 1 | Status: SHIPPED | OUTPATIENT
Start: 2017-09-11 | End: 2017-09-18 | Stop reason: SDUPTHER

## 2017-09-12 ENCOUNTER — TELEPHONE (OUTPATIENT)
Dept: FAMILY MEDICINE | Facility: CLINIC | Age: 77
End: 2017-09-12

## 2017-09-12 DIAGNOSIS — E78.2 COMBINED HYPERLIPIDEMIA ASSOCIATED WITH TYPE 2 DIABETES MELLITUS: Primary | ICD-10-CM

## 2017-09-12 DIAGNOSIS — E11.69 COMBINED HYPERLIPIDEMIA ASSOCIATED WITH TYPE 2 DIABETES MELLITUS: Primary | ICD-10-CM

## 2017-09-12 NOTE — TELEPHONE ENCOUNTER
MD CHARU Blas. Staff             Lab ok .   Schedule lipid panel, hemoglobin A1c,  urine microalbumin in mid February.   My nurse will contact you to arrange.   Thanks

## 2017-09-17 DIAGNOSIS — M35.3 PMR (POLYMYALGIA RHEUMATICA): ICD-10-CM

## 2017-09-18 RX ORDER — PREDNISONE 5 MG/1
TABLET ORAL
Qty: 60 TABLET | Refills: 2 | Status: SHIPPED | OUTPATIENT
Start: 2017-09-18 | End: 2018-06-06 | Stop reason: ALTCHOICE

## 2017-10-09 ENCOUNTER — OFFICE VISIT (OUTPATIENT)
Dept: FAMILY MEDICINE | Facility: CLINIC | Age: 77
End: 2017-10-09
Payer: MEDICARE

## 2017-10-09 VITALS
DIASTOLIC BLOOD PRESSURE: 68 MMHG | HEART RATE: 68 BPM | WEIGHT: 179.44 LBS | BODY MASS INDEX: 22.31 KG/M2 | TEMPERATURE: 98 F | SYSTOLIC BLOOD PRESSURE: 116 MMHG | HEIGHT: 75 IN

## 2017-10-09 DIAGNOSIS — E11.40 TYPE 2 DIABETES MELLITUS WITH DIABETIC NEUROPATHY, WITHOUT LONG-TERM CURRENT USE OF INSULIN: ICD-10-CM

## 2017-10-09 DIAGNOSIS — E11.59 HYPERTENSION ASSOCIATED WITH DIABETES: ICD-10-CM

## 2017-10-09 DIAGNOSIS — N18.30 TYPE 2 DIABETES MELLITUS WITH STAGE 3 CHRONIC KIDNEY DISEASE, WITHOUT LONG-TERM CURRENT USE OF INSULIN: ICD-10-CM

## 2017-10-09 DIAGNOSIS — Z86.010 H/O ADENOMATOUS POLYP OF COLON: ICD-10-CM

## 2017-10-09 DIAGNOSIS — G20.C PARKINSONISM, UNSPECIFIED PARKINSONISM TYPE: ICD-10-CM

## 2017-10-09 DIAGNOSIS — G31.84 MCI (MILD COGNITIVE IMPAIRMENT): ICD-10-CM

## 2017-10-09 DIAGNOSIS — Z85.46 HISTORY OF PROSTATE CANCER: ICD-10-CM

## 2017-10-09 DIAGNOSIS — E11.22 TYPE 2 DIABETES MELLITUS WITH STAGE 3 CHRONIC KIDNEY DISEASE, WITHOUT LONG-TERM CURRENT USE OF INSULIN: ICD-10-CM

## 2017-10-09 DIAGNOSIS — Z00.00 ROUTINE HISTORY AND PHYSICAL EXAMINATION OF ADULT: Primary | ICD-10-CM

## 2017-10-09 DIAGNOSIS — E11.69 COMBINED HYPERLIPIDEMIA ASSOCIATED WITH TYPE 2 DIABETES MELLITUS: ICD-10-CM

## 2017-10-09 DIAGNOSIS — I15.2 HYPERTENSION ASSOCIATED WITH DIABETES: ICD-10-CM

## 2017-10-09 DIAGNOSIS — E78.2 COMBINED HYPERLIPIDEMIA ASSOCIATED WITH TYPE 2 DIABETES MELLITUS: ICD-10-CM

## 2017-10-09 PROCEDURE — 99499 UNLISTED E&M SERVICE: CPT | Mod: S$GLB,,, | Performed by: FAMILY MEDICINE

## 2017-10-09 PROCEDURE — 99397 PER PM REEVAL EST PAT 65+ YR: CPT | Mod: S$GLB,,, | Performed by: FAMILY MEDICINE

## 2017-10-09 PROCEDURE — 99999 PR PBB SHADOW E&M-EST. PATIENT-LVL IV: CPT | Mod: PBBFAC,,, | Performed by: FAMILY MEDICINE

## 2017-10-09 RX ORDER — AMLODIPINE BESYLATE 10 MG/1
10 TABLET ORAL DAILY
Qty: 90 TABLET | Refills: 3 | Status: SHIPPED | OUTPATIENT
Start: 2017-10-09 | End: 2018-11-08 | Stop reason: SDUPTHER

## 2017-10-09 RX ORDER — GLIMEPIRIDE 2 MG/1
2 TABLET ORAL
Qty: 90 TABLET | Refills: 3 | Status: SHIPPED | OUTPATIENT
Start: 2017-10-09 | End: 2019-01-01 | Stop reason: SDUPTHER

## 2017-10-09 NOTE — PROGRESS NOTES
Patient presents physical exam.  Parkinsonism followed by neurology doing well with current medication.  Mild cognitive impairment stable.  Hypertension and diabetes controlled.  Reviewed most recent laboratory.  Hyperlipidemia stable.  History of prostate cancer in the remote past not currently under any treatment.  Not seeing the urologist.  History of adenomatous colon polyps and would like to have follow-up colonoscopy.  He is due this year due to a large polyp 2 years ago.  States decreased alcohol consumption considerably    Past Medical History:  Past Medical History:   Diagnosis Date    Adenomatous polyp of colon     Arthritis     Carotid artery plaque     Less than 50%    Chronic kidney disease, stage 3     Diabetes mellitus, type 2     Erectile dysfunction following radical prostatectomy     Fractures     Hyperlipidemia LDL goal < 100     Hypertension goal BP (blood pressure) < 130/80     Lacunar stroke 8/17/2017    MCI (mild cognitive impairment)     Parkinsonism 4/11/2017    Prostate cancer 2001     Past Surgical History:   Procedure Laterality Date    APPENDECTOMY      COLONOSCOPY  In 3/2/2010    Repeat 5 years    COLONOSCOPY N/A 9/29/2015    Procedure: COLONOSCOPY;  Surgeon: Erik Brandt MD;  Location: [facility];  Service: Endoscopy;  Laterality: N/A;    COLONOSCOPY N/A 10/11/2016    Procedure: COLONOSCOPY;  Surgeon: Erik Brandt MD;  Location: [facility];  Service: Endoscopy;  Laterality: N/A;    FRACTURE SURGERY      HERNIA REPAIR      LYMPH NODE DISSECTION      Pelvic    PROSTATECTOMY      SHOULDER ARTHROSCOPY      Right     Social History     Social History    Marital status:      Spouse name: Joaquin    Number of children: 0    Years of education: N/A     Occupational History    Retired      Social History Main Topics    Smoking status: Never Smoker    Smokeless tobacco: Never Used    Alcohol use 21.0 oz/week     14 Cans of beer, 21 Shots of liquor per week     Drug use: Unknown    Sexual activity: Not on file     Other Topics Concern    Not on file     Social History Narrative    No narrative on file     Family History   Problem Relation Age of Onset    Leukemia Father 68     Review of patient's allergies indicates:  No Known Allergies  Current Outpatient Prescriptions on File Prior to Visit   Medication Sig Dispense Refill    aspirin (ECOTRIN) 81 MG EC tablet Take 1 tablet (81 mg total) by mouth once daily.  0    blood sugar diagnostic (GLUCOSE BLOOD) Strp Test glucose 1 x daily 35 strip prn    carbidopa-levodopa  mg (SINEMET)  mg per tablet 0.5 tablet QD for 1 day, then 0.5 tablet BID for 1 day, then 0.5 tablet TID and continue 45 tablet 11    fish oil-omega-3 fatty acids 300-1,000 mg capsule Take 2 g by mouth once daily.      GLUC.METER,DIS.P-LOADED STRIPS (GLUCOSE METER, DISP & STRIPS) Kit Check glucose once per day 1 kit prn    GLUCOSAMINE HCL (GLUCOSAMINE, BULK, MISC) by Misc.(Non-Drug; Combo Route) route.      L. RHAMNOSUS GG/INULIN (CULTURELLE PROBIOTICS ORAL) Take by mouth.      lancets Misc As directed 100 each prn    MEN'S MULTI-VITAMIN ORAL Take by mouth.      metformin (GLUCOPHAGE) 1000 MG tablet TAKE 1 TABLET BY MOUTH DAILY WITH BREAKFAST 90 tablet 3    pravastatin (PRAVACHOL) 20 MG tablet TAKE 1 TABLET BY MOUTH DAILY 90 tablet 3    predniSONE (DELTASONE) 5 MG tablet TAKE 1 TABLET(5 MG) BY MOUTH TWICE DAILY (Patient taking differently: TAKE ONCE DAILY) 60 tablet 2    turmeric root extract 500 mg Cap Take by mouth.      [DISCONTINUED] amlodipine (NORVASC) 10 MG tablet TAKE 1 TABLET BY MOUTH EVERY DAY 90 tablet 3    [DISCONTINUED] glimepiride (AMARYL) 2 MG tablet Take 1 tablet (2 mg total) by mouth once daily at 6am. 90 tablet 1    benazepril-hydrochlorthiazide (LOTENSIN HCT) 20-12.5 mg per tablet TAKE 1 TABLET BY MOUTH EVERY DAY 90 tablet 3     No current facility-administered medications on file prior to visit.   "          ROS:  GENERAL: No fever, chills,  or significant weight changes.  HEENT: No headache or hearing complaints.  No dysphagia  Eyes: No vision complaints  CHEST: Denies ANDRE, cyanosis, wheezing, cough and sputum production.  CARDIOVASCULAR: Denies chest pain, PND, orthopnea or reduced exercise tolerance.  ABDOMEN: Appetite fine. Denies diarrhea, abdominal pain, hematemesis or blood in stool.  URINARY: No flank pain, dysuria or hematuria.  MUSCULOSKELETAL: No warmth swelling or tenderness of the joints  NEUROLOGIC: No focal weakness numbness or paresthesia  PSYCHIATRIC: Denies depression    OBJECTIVE:   Vitals:    10/09/17 0945 10/09/17 1011   BP: (!) 100/53 116/68   Pulse: 68    Temp: 97.9 °F (36.6 °C)    Weight: 81.4 kg (179 lb 7.3 oz)    Height: 6' 3" (1.905 m)      Wt Readings from Last 3 Encounters:   10/09/17 81.4 kg (179 lb 7.3 oz)   08/17/17 82.2 kg (181 lb 3.5 oz)   08/03/17 80.1 kg (176 lb 9.4 oz)       APPEARANCE: Well nourished, well developed, in no acute distress.   HEAD: Normocephalic. Atraumatic. No sinus tenderness.   EYES:   Right eye: Pupil reactive. Conjunctiva clear.   Left eye: Pupil reactive. Conjunctiva clear.   Both fundi: Grossly normal to nondilated exam. EOMI.   EARS: TM's intact. Light reflex normal. No retraction or perforation.   NOSE: clear.   MOUTH & THROAT: No pharyngeal erythema or exudate. No lesions.   NECK: No bruits. No JVD. No cervical lymphadenopathy. No thyromegaly.   CHEST: Breath sounds clear bilaterally. Normal respiratory effort   CARDIOVASCULAR: Normal rate. Regular rhythm. No murmurs. No rub. No gallops.   ABDOMEN: Bowel sounds normal. Soft. No tenderness. No organomegaly.   PERIPHERAL VASCULAR: No cyanosis. No clubbing. No edema.   NEUROLOGIC: No focal findings.    Feet:Sensation in the feet intact to monofilament testing.   No significant foot lesions.Pulses palpable.  MENTAL STATUS: Alert. Oriented x 3.               Diagnoses and all orders for this " visit:    Routine history and physical examination of adult  -     Case request GI: COLONOSCOPY  -     Prostate Specific Antigen, Diagnostic; Future    Parkinsonism, unspecified Parkinsonism type    MCI (mild cognitive impairment)    Hypertension associated with diabetes    Combined hyperlipidemia associated with type 2 diabetes mellitus    History of prostate cancer  -     Prostate Specific Antigen, Diagnostic; Future    Type 2 diabetes mellitus with diabetic neuropathy, without long-term current use of insulin    Type 2 diabetes mellitus with stage 3 chronic kidney disease, without long-term current use of insulin    H/O adenomatous polyp of colon  -     Case request GI: COLONOSCOPY    Other orders  -     amlodipine (NORVASC) 10 MG tablet; Take 1 tablet (10 mg total) by mouth once daily.  -     glimepiride (AMARYL) 2 MG tablet; Take 1 tablet (2 mg total) by mouth daily with breakfast.     continue current medications.  Follow-up laboratory already scheduled.Anticipatory guidance: Don't smoke.  Healthy diet and regular exercise recommended.

## 2017-10-11 RX ORDER — SODIUM, POTASSIUM,MAG SULFATES 17.5-3.13G
SOLUTION, RECONSTITUTED, ORAL ORAL
Qty: 354 ML | Refills: 0 | Status: ON HOLD | OUTPATIENT
Start: 2017-10-11 | End: 2017-10-24 | Stop reason: HOSPADM

## 2017-10-24 ENCOUNTER — ANESTHESIA EVENT (OUTPATIENT)
Dept: ENDOSCOPY | Facility: HOSPITAL | Age: 77
End: 2017-10-24
Payer: MEDICARE

## 2017-10-24 ENCOUNTER — ANESTHESIA (OUTPATIENT)
Dept: ENDOSCOPY | Facility: HOSPITAL | Age: 77
End: 2017-10-24
Payer: MEDICARE

## 2017-10-24 ENCOUNTER — SURGERY (OUTPATIENT)
Age: 77
End: 2017-10-24

## 2017-10-24 ENCOUNTER — HOSPITAL ENCOUNTER (OUTPATIENT)
Facility: HOSPITAL | Age: 77
Discharge: HOME | End: 2017-10-24
Attending: FAMILY MEDICINE | Admitting: FAMILY MEDICINE
Payer: MEDICARE

## 2017-10-24 VITALS — RESPIRATION RATE: 10 BRPM

## 2017-10-24 DIAGNOSIS — Z86.010 HISTORY OF COLON POLYPS: Primary | ICD-10-CM

## 2017-10-24 DIAGNOSIS — Z86.010 H/O ADENOMATOUS POLYP OF COLON: ICD-10-CM

## 2017-10-24 DIAGNOSIS — K57.30 DIVERTICULOSIS OF LARGE INTESTINE WITHOUT HEMORRHAGE: ICD-10-CM

## 2017-10-24 LAB
GLUCOSE SERPL-MCNC: 142 MG/DL (ref 70–110)
POCT GLUCOSE: 142 MG/DL (ref 70–110)

## 2017-10-24 PROCEDURE — 45385 COLONOSCOPY W/LESION REMOVAL: CPT | Performed by: FAMILY MEDICINE

## 2017-10-24 PROCEDURE — 45381 COLONOSCOPY SUBMUCOUS NJX: CPT | Performed by: FAMILY MEDICINE

## 2017-10-24 PROCEDURE — 45381 COLONOSCOPY SUBMUCOUS NJX: CPT | Mod: 51,,, | Performed by: FAMILY MEDICINE

## 2017-10-24 PROCEDURE — 27201012 HC FORCEPS, HOT/COLD, DISP: Performed by: FAMILY MEDICINE

## 2017-10-24 PROCEDURE — 88305 TISSUE EXAM BY PATHOLOGIST: CPT | Performed by: PATHOLOGY

## 2017-10-24 PROCEDURE — 37000009 HC ANESTHESIA EA ADD 15 MINS: Performed by: FAMILY MEDICINE

## 2017-10-24 PROCEDURE — 63600175 PHARM REV CODE 636 W HCPCS: Performed by: NURSE ANESTHETIST, CERTIFIED REGISTERED

## 2017-10-24 PROCEDURE — 45388 COLONOSCOPY W/ABLATION: CPT | Performed by: FAMILY MEDICINE

## 2017-10-24 PROCEDURE — 25000003 PHARM REV CODE 250: Performed by: FAMILY MEDICINE

## 2017-10-24 PROCEDURE — 45380 COLONOSCOPY AND BIOPSY: CPT | Mod: 59,,, | Performed by: FAMILY MEDICINE

## 2017-10-24 PROCEDURE — 27200959 HC CATHETER, ERBE, ARGON PLASMA: Performed by: FAMILY MEDICINE

## 2017-10-24 PROCEDURE — 37000008 HC ANESTHESIA 1ST 15 MINUTES: Performed by: FAMILY MEDICINE

## 2017-10-24 PROCEDURE — 88305 TISSUE EXAM BY PATHOLOGIST: CPT | Mod: 26,,, | Performed by: PATHOLOGY

## 2017-10-24 PROCEDURE — 82962 GLUCOSE BLOOD TEST: CPT | Performed by: FAMILY MEDICINE

## 2017-10-24 PROCEDURE — 45380 COLONOSCOPY AND BIOPSY: CPT | Performed by: FAMILY MEDICINE

## 2017-10-24 PROCEDURE — 45385 COLONOSCOPY W/LESION REMOVAL: CPT | Mod: 59,,, | Performed by: FAMILY MEDICINE

## 2017-10-24 PROCEDURE — 45388 COLONOSCOPY W/ABLATION: CPT | Mod: PT,,, | Performed by: FAMILY MEDICINE

## 2017-10-24 RX ORDER — SODIUM CHLORIDE, SODIUM LACTATE, POTASSIUM CHLORIDE, CALCIUM CHLORIDE 600; 310; 30; 20 MG/100ML; MG/100ML; MG/100ML; MG/100ML
INJECTION, SOLUTION INTRAVENOUS CONTINUOUS
Status: DISCONTINUED | OUTPATIENT
Start: 2017-10-24 | End: 2017-10-24 | Stop reason: HOSPADM

## 2017-10-24 RX ORDER — LIDOCAINE HCL/PF 100 MG/5ML
SYRINGE (ML) INTRAVENOUS
Status: DISCONTINUED | OUTPATIENT
Start: 2017-10-24 | End: 2017-10-24

## 2017-10-24 RX ORDER — PROPOFOL 10 MG/ML
VIAL (ML) INTRAVENOUS
Status: DISCONTINUED | OUTPATIENT
Start: 2017-10-24 | End: 2017-10-24

## 2017-10-24 RX ADMIN — PROPOFOL 20 MG: 10 INJECTION, EMULSION INTRAVENOUS at 08:10

## 2017-10-24 RX ADMIN — PROPOFOL 80 MG: 10 INJECTION, EMULSION INTRAVENOUS at 08:10

## 2017-10-24 RX ADMIN — PROPOFOL 40 MG: 10 INJECTION, EMULSION INTRAVENOUS at 08:10

## 2017-10-24 RX ADMIN — LIDOCAINE HYDROCHLORIDE 40 MG: 20 INJECTION, SOLUTION INTRAVENOUS at 08:10

## 2017-10-24 RX ADMIN — SODIUM CHLORIDE, POTASSIUM CHLORIDE, SODIUM LACTATE AND CALCIUM CHLORIDE: 600; 310; 30; 20 INJECTION, SOLUTION INTRAVENOUS at 07:10

## 2017-10-24 NOTE — DISCHARGE SUMMARY
Endoscopy Discharge Summary      Admit Date: 10/24/2017    Discharge Date and Time:  10/24/2017 9:12 AM    Attending Physician: Erik Brandt MD     Discharge Physician: Erik Brandt MD     Principal Admitting Diagnoses: History of colon polyps         Discharge Diagnosis: The primary encounter diagnosis was History of colon polyps. Diagnoses of H/O adenomatous polyp of colon and Diverticulosis of large intestine without hemorrhage were also pertinent to this visit.     Discharged Condition: Good    Indication for Admission: History of colon polyps     Hospital Course: Patient was admitted for an inpatient procedure and tolerated the procedure well with no complications.    Significant Diagnostic Studies: argon plasma ERBE treatment, injection of spot dye,, Colonoscopy with cold biopsy polypectomy and Colonoscopy with hot snare polypectomy    Pathology (if any):  Specimen (12h ago through future)    Start     Ordered    10/24/17 0823  Specimen to Pathology - Surgery  Once     Comments:  1.  Cecum polyp2.  Proximal Ascending colon polyp3.  Ascending colon polyp biopsy4.  Ascending Abnormal colon mucosa biopsy5.  Transverse colon polyp      10/24/17 0905          Estimated Blood Loss: 1 ml.    Discussed with: patient and family.    Disposition: Home.    Follow Up/Patient Instructions:   Current Discharge Medication List      CONTINUE these medications which have NOT CHANGED    Details   amlodipine (NORVASC) 10 MG tablet Take 1 tablet (10 mg total) by mouth once daily.  Qty: 90 tablet, Refills: 3      aspirin (ECOTRIN) 81 MG EC tablet Take 1 tablet (81 mg total) by mouth once daily.  Refills: 0      benazepril-hydrochlorthiazide (LOTENSIN HCT) 20-12.5 mg per tablet TAKE 1 TABLET BY MOUTH EVERY DAY  Qty: 90 tablet, Refills: 3      blood sugar diagnostic (GLUCOSE BLOOD) Strp Test glucose 1 x daily  Qty: 35 strip, Refills: prn      carbidopa-levodopa  mg (SINEMET)  mg per tablet 0.5 tablet QD for 1  day, then 0.5 tablet BID for 1 day, then 0.5 tablet TID and continue  Qty: 45 tablet, Refills: 11      fish oil-omega-3 fatty acids 300-1,000 mg capsule Take 2 g by mouth once daily.      FLUZONE HIGH-DOSE 2017-18, PF, 180 mcg/0.5 mL vaccine       glimepiride (AMARYL) 2 MG tablet Take 1 tablet (2 mg total) by mouth daily with breakfast.  Qty: 90 tablet, Refills: 3      GLUC.METER,DIS.P-LOADED STRIPS (GLUCOSE METER, DISP & STRIPS) Kit Check glucose once per day  Qty: 1 kit, Refills: prn      GLUCOSAMINE HCL (GLUCOSAMINE, BULK, MISC) by Misc.(Non-Drug; Combo Route) route.      L. RHAMNOSUS GG/INULIN (CULTURELLE PROBIOTICS ORAL) Take by mouth.      lancets Misc As directed  Qty: 100 each, Refills: prn      MEN'S MULTI-VITAMIN ORAL Take by mouth.      metformin (GLUCOPHAGE) 1000 MG tablet TAKE 1 TABLET BY MOUTH DAILY WITH BREAKFAST  Qty: 90 tablet, Refills: 3      pravastatin (PRAVACHOL) 20 MG tablet TAKE 1 TABLET BY MOUTH DAILY  Qty: 90 tablet, Refills: 3      predniSONE (DELTASONE) 5 MG tablet TAKE 1 TABLET(5 MG) BY MOUTH TWICE DAILY  Qty: 60 tablet, Refills: 2    Associated Diagnoses: PMR (polymyalgia rheumatica)      turmeric root extract 500 mg Cap Take by mouth.         STOP taking these medications       sodium,potassium,mag sulfates (SUPREP BOWEL PREP KIT) 17.5-3.13-1.6 gram SolR Comments:   Reason for Stopping:                 Discharge Procedure Orders  Diet general     Activity as tolerated     Call MD for:  temperature >100.4     Call MD for:  persistent nausea and vomiting     Call MD for:  severe uncontrolled pain     Call MD for:  difficulty breathing, headache or visual disturbances     Call MD for:  redness, tenderness, or signs of infection (pain, swelling, redness, odor or green/yellow discharge around incision site)     Call MD for:  hives     Call MD for:  persistent dizziness or light-headedness     No dressing needed         Follow-up Information     Erik Brandt MD. Call in 1 week.     Specialty:  Family Medicine  Why:  To receive pathology results.  Contact information:  [address] [address] [place] [address] [phone]                   [email or web address]

## 2017-10-24 NOTE — TRANSFER OF CARE
Anesthesia Transfer of Care Note    Patient: Jagdeep Greenberg    Procedure(s) Performed: Procedure(s) (LRB):  COLONOSCOPY (N/A)    Patient location: PACU    Anesthesia Type: MAC    Transport from OR: Transported from OR on room air with adequate spontaneous ventilation    Post pain: adequate analgesia    Post assessment: no apparent anesthetic complications    Post vital signs: stable    Level of consciousness: awake    Nausea/Vomiting: no nausea/vomiting    Complications: none    Transfer of care protocol was followed      Last vitals: There were no vitals taken for this visit.

## 2017-10-24 NOTE — ANESTHESIA POSTPROCEDURE EVALUATION
Anesthesia Post Evaluation    Patient: Jagdeep Greenberg    Procedure(s) Performed: Procedure(s) (LRB):  COLONOSCOPY (N/A)    Final Anesthesia Type: MAC  Patient location during evaluation: PACU  Patient participation: Yes- Able to Participate  Level of consciousness: awake and alert  Post-procedure vital signs: reviewed and stable  Pain management: adequate  Airway patency: patent  PONV status at discharge: No PONV  Anesthetic complications: no      Cardiovascular status: blood pressure returned to baseline  Respiratory status: unassisted  Hydration status: euvolemic  Follow-up not needed.        There were no vitals taken for this visit.    Pain/Chirag Score: Pain Assessment Performed: Yes (10/24/2017  7:39 AM)  Presence of Pain: denies (10/24/2017  7:39 AM)

## 2017-10-24 NOTE — OR NURSING
Argon was used in the colon in tissue destruction of polyps.  Right colon setting 0.5 / 2 /max 20.  Site in colon was tattooed with 1 ml of Spot ink in Ascending colon Lot [id number] exp 2/14/2019.

## 2017-10-24 NOTE — ANESTHESIA RELEASE NOTE
Anesthesia Release from PACU Note    Patient: Jagdeep Greenberg    Procedure(s) Performed: Procedure(s) (LRB):  COLONOSCOPY (N/A)    Anesthesia type: MAC    Post pain: Adequate analgesia    Post assessment: no apparent anesthetic complications    Last Vitals: There were no vitals taken for this visit.    Post vital signs: stable    Level of consciousness: awake    Nausea/Vomiting: no nausea/no vomiting    Complications: none    Airway Patency: patent    Respiratory: unassisted    Cardiovascular: stable and blood pressure at baseline    Hydration: euvolemic

## 2017-10-24 NOTE — DISCHARGE INSTRUCTIONS

## 2017-10-24 NOTE — ANESTHESIA PREPROCEDURE EVALUATION
10/24/2017  Jagdeep Greenberg is a 77 y.o., male.    Anesthesia Evaluation    I have reviewed the Patient Summary Reports.    I have reviewed the Nursing Notes.   I have reviewed the Medications.     Review of Systems  Anesthesia Hx:  No problems with previous Anesthesia    Social:  Non-Smoker    Hematology/Oncology:  Hematology Normal   Oncology Normal     EENT/Dental:EENT/Dental Normal   Cardiovascular:   Hypertension, well controlled hyperlipidemia    Pulmonary:  Pulmonary Normal    Renal/:   Chronic Renal Disease (Stage III), CRI    Hepatic/GI:   Bowel Prep.    Musculoskeletal:   Arthritis     Neurological:   CVA, no residual symptoms  Movement Disorder Dx, Parkinson's Disease   Endocrine:   Diabetes, well controlled, type 2    Dermatological:  Skin Normal    Psych:  Psychiatric Normal           Physical Exam  General:  Obesity    Airway/Jaw/Neck:  Airway Findings: Mouth Opening: Normal Tongue: Normal  General Airway Assessment: Adult       Chest/Lungs:  Chest/Lungs Findings: Clear to auscultation     Heart/Vascular:  Heart Findings: Rate: Normal             Anesthesia Plan  Type of Anesthesia, risks & benefits discussed:  Anesthesia Type:  MAC  Patient's Preference:   Intra-op Monitoring Plan:   Intra-op Monitoring Plan Comments:   Post Op Pain Control Plan:   Post Op Pain Control Plan Comments:   Induction:   IV  Beta Blocker:  Patient is not currently on a Beta-Blocker (No further documentation required).       Informed Consent: Patient understands risks and agrees with Anesthesia plan.  Questions answered. Anesthesia consent signed with patient.  ASA Score: 3     Day of Surgery Review of History & Physical: I have interviewed and examined the patient. I have reviewed the patient's H&P dated:  There are no significant changes.          Ready For Surgery From Anesthesia Perspective.

## 2017-10-24 NOTE — H&P
Short Stay Endoscopy History and Physical    PCP - Panda Contreras MD    Procedure - Colonoscopy  ASA - 2  Mallampati - per anesthesia  History of Anesthesia problems - no  Family history Anesthesia problems -  no     HPI:  This is a 77 y.o. male here for evaluation of :   Active Hospital Problems    Diagnosis  POA    *History of colon polyps [Z86.010]  Not Applicable      Resolved Hospital Problems    Diagnosis Date Resolved POA   No resolved problems to display.         Health Maintenance       Date Due Completion Date    TETANUS VACCINE 09[birth year]13/1958 ---    Colonoscopy 10/11/2017 10/11/2016    Override on 3/2/2010: Done    Urine Microalbumin 11/09/2017 11/9/2016    Lipid Panel 11/09/2017 11/9/2016    Hemoglobin A1c 02/28/2018 8/29/2017    Eye Exam 04/05/2018 4/5/2017    Override on 8/25/2014: Done (Per Dr. Pace)    Override on 9/17/2012: Done (per patient)    Foot Exam 10/09/2018 10/9/2017 (Done)    Override on 10/9/2017: Done    Override on 11/7/2016: Done          Screening - yes  History of polyps - yes-he had a large one last year.    Diarrhea - no  Anemia - no  Blood in stools - no  Abdominal pain - no  Other - no    ROS:  CONSTITUTIONAL: Denies weight change,  fatigue, fevers, chills, night sweats.  CARDIOVASCULAR: Denies chest pain, shortness of breath, orthopnea and edema.  RESPIRATORY: Denies cough, hemoptysis, dyspnea, and wheezing.  GI: See HPI.    Medical History:   Past Medical History:   Diagnosis Date    Adenomatous polyp of colon     Arthritis     Carotid artery plaque     Less than 50%    Chronic kidney disease, stage 3     Diabetes mellitus, type 2     Erectile dysfunction following radical prostatectomy     Fractures     Hyperlipidemia LDL goal < 100     Hypertension goal BP (blood pressure) < 130/80     Lacunar stroke 8/17/2017    MCI (mild cognitive impairment)     Parkinsonism 4/11/2017    Prostate cancer 2001    Stroke        Surgical History:   Past Surgical History:    Procedure Laterality Date    APPENDECTOMY      COLONOSCOPY  In 3/2/2010    Repeat 5 years    COLONOSCOPY N/A 9/29/2015    Procedure: COLONOSCOPY;  Surgeon: Erik Brandt MD;  Location: [facility] ENDO;  Service: Endoscopy;  Laterality: N/A;    COLONOSCOPY N/A 10/11/2016    Procedure: COLONOSCOPY;  Surgeon: Erik Brandt MD;  Location: [facility] ENDO;  Service: Endoscopy;  Laterality: N/A;    FRACTURE SURGERY      HERNIA REPAIR      LYMPH NODE DISSECTION      Pelvic    PROSTATECTOMY      SHOULDER ARTHROSCOPY      Right       Family History:   Family History   Problem Relation Age of Onset    Leukemia Father 68       Social History:   Social History   Substance Use Topics    Smoking status: Never Smoker    Smokeless tobacco: Never Used    Alcohol use 21.0 oz/week     14 Cans of beer, 21 Shots of liquor per week       Allergies:   Review of patient's allergies indicates:  No Known Allergies    Medications:   No current facility-administered medications on file prior to encounter.      Current Outpatient Prescriptions on File Prior to Encounter   Medication Sig Dispense Refill    amlodipine (NORVASC) 10 MG tablet Take 1 tablet (10 mg total) by mouth once daily. 90 tablet 3    aspirin (ECOTRIN) 81 MG EC tablet Take 1 tablet (81 mg total) by mouth once daily.  0    benazepril-hydrochlorthiazide (LOTENSIN HCT) 20-12.5 mg per tablet TAKE 1 TABLET BY MOUTH EVERY DAY 90 tablet 3    blood sugar diagnostic (GLUCOSE BLOOD) Strp Test glucose 1 x daily 35 strip prn    carbidopa-levodopa  mg (SINEMET)  mg per tablet 0.5 tablet QD for 1 day, then 0.5 tablet BID for 1 day, then 0.5 tablet TID and continue 45 tablet 11    fish oil-omega-3 fatty acids 300-1,000 mg capsule Take 2 g by mouth once daily.      FLUZONE HIGH-DOSE 2017-18, PF, 180 mcg/0.5 mL vaccine       glimepiride (AMARYL) 2 MG tablet Take 1 tablet (2 mg total) by mouth daily with breakfast. 90 tablet 3    GLUC.METER,DIS.P-LOADED STRIPS  (GLUCOSE METER, DISP & STRIPS) Kit Check glucose once per day 1 kit prn    GLUCOSAMINE HCL (GLUCOSAMINE, BULK, MISC) by Misc.(Non-Drug; Combo Route) route.      L. RHAMNOSUS GG/INULIN (CULTURELLE PROBIOTICS ORAL) Take by mouth.      lancets Misc As directed 100 each prn    MEN'S MULTI-VITAMIN ORAL Take by mouth.      metformin (GLUCOPHAGE) 1000 MG tablet TAKE 1 TABLET BY MOUTH DAILY WITH BREAKFAST 90 tablet 3    pravastatin (PRAVACHOL) 20 MG tablet TAKE 1 TABLET BY MOUTH DAILY 90 tablet 3    predniSONE (DELTASONE) 5 MG tablet TAKE 1 TABLET(5 MG) BY MOUTH TWICE DAILY (Patient taking differently: TAKE ONCE DAILY) 60 tablet 2    turmeric root extract 500 mg Cap Take by mouth.         Physical Exam:  Vital Signs: see nurse notes.  General Appearance: Well appearing in no acute distress  ENT: OP clear  Chest: CTA B  CV: RRR, no m/r/g  Abd: s/nt/nd/nabs  Ext: no edema    Labs:Reviewed    IMP:  Active Hospital Problems    Diagnosis  POA    *History of colon polyps [Z86.010]  Not Applicable      Resolved Hospital Problems    Diagnosis Date Resolved POA   No resolved problems to display.         Plan:   I have explained the risks and benefits of colonoscopy to the patient including but not limited to bleeding, perforation, infection, and death. The patient wishes to proceed.

## 2017-10-25 VITALS
RESPIRATION RATE: 20 BRPM | HEART RATE: 61 BPM | DIASTOLIC BLOOD PRESSURE: 74 MMHG | SYSTOLIC BLOOD PRESSURE: 154 MMHG | OXYGEN SATURATION: 100 %

## 2017-10-30 ENCOUNTER — LAB VISIT (OUTPATIENT)
Dept: LAB | Facility: HOSPITAL | Age: 77
End: 2017-10-30
Attending: FAMILY MEDICINE
Payer: MEDICARE

## 2017-10-30 DIAGNOSIS — M25.50 PAIN, JOINT, MULTIPLE SITES: ICD-10-CM

## 2017-10-30 DIAGNOSIS — M35.3 PMR (POLYMYALGIA RHEUMATICA): ICD-10-CM

## 2017-10-30 LAB
CRP SERPL-MCNC: 0.8 MG/L
ERYTHROCYTE [SEDIMENTATION RATE] IN BLOOD BY WESTERGREN METHOD: 2 MM/HR

## 2017-10-30 PROCEDURE — 85651 RBC SED RATE NONAUTOMATED: CPT | Mod: PO

## 2017-10-30 PROCEDURE — 86140 C-REACTIVE PROTEIN: CPT

## 2017-10-30 PROCEDURE — 36415 COLL VENOUS BLD VENIPUNCTURE: CPT | Mod: PO

## 2017-10-31 ENCOUNTER — OFFICE VISIT (OUTPATIENT)
Dept: RHEUMATOLOGY | Facility: CLINIC | Age: 77
End: 2017-10-31
Payer: MEDICARE

## 2017-10-31 VITALS
SYSTOLIC BLOOD PRESSURE: 141 MMHG | DIASTOLIC BLOOD PRESSURE: 71 MMHG | WEIGHT: 185.44 LBS | HEART RATE: 71 BPM | HEIGHT: 75 IN | BODY MASS INDEX: 23.06 KG/M2

## 2017-10-31 DIAGNOSIS — M15.9 PRIMARY OSTEOARTHRITIS INVOLVING MULTIPLE JOINTS: ICD-10-CM

## 2017-10-31 DIAGNOSIS — M35.3 PMR (POLYMYALGIA RHEUMATICA): Primary | ICD-10-CM

## 2017-10-31 DIAGNOSIS — M17.12 PRIMARY OSTEOARTHRITIS OF LEFT KNEE: ICD-10-CM

## 2017-10-31 DIAGNOSIS — N18.30 CHRONIC KIDNEY DISEASE, STAGE 3: ICD-10-CM

## 2017-10-31 PROCEDURE — 99214 OFFICE O/P EST MOD 30 MIN: CPT | Mod: S$GLB,,, | Performed by: PHYSICIAN ASSISTANT

## 2017-10-31 PROCEDURE — 99499 UNLISTED E&M SERVICE: CPT | Mod: S$GLB,,, | Performed by: PHYSICIAN ASSISTANT

## 2017-10-31 PROCEDURE — 99999 PR PBB SHADOW E&M-EST. PATIENT-LVL III: CPT | Mod: PBBFAC,,, | Performed by: PHYSICIAN ASSISTANT

## 2017-10-31 RX ORDER — DICLOFENAC SODIUM 10 MG/G
2 GEL TOPICAL 4 TIMES DAILY
Qty: 100 G | Refills: 3 | Status: SHIPPED | OUTPATIENT
Start: 2017-10-31 | End: 2018-02-20

## 2017-10-31 NOTE — PROGRESS NOTES
Subjective:       Patient ID: Jagdeep Greenberg is a 77 y.o. male.    Chief Complaint: pmr; Osteoarthritis; and Chronic Kidney Disease    Jagdeep is here for follow up. Treated for PMR in the past. Weaned off prednisone  Over 2 years now. No need to add dmard therapy. At last visit he had multiple issues. Increased joint pain and muscle aches. Stiffness awais hip and shoulder area. Esr was mildly elevated at 12. He felt like when dx with prm. Also trigger finger on both hands. We injected the tendon sheath and the trigger finger resolved. We added back prednisone 5 mg bid and he felt better within 2 days. Added turmeric  1000 mg bid. He has done better. Esr and crp normal. We have been weaning back down on his prednisone the past several month.  He is now down to prednisone  5 mg Q am. . He is taking glucosamine and fish oil as well.  Today  pain level 3/10.  Left medial knee. Has small area hurts on and off, no swelling. No muscle aches or joint pain especially in his hips and shoulders.  No fevers, no weight loss.  Triggering resolved.   Unable to take nonsteroidals because of chronic kidney disease.  In the past uses Tylenol when necessary for pain.        Osteoarthritis   Associated symptoms include arthralgias. Pertinent negatives include no abdominal pain, chest pain, chills, congestion, coughing, fatigue, fever, joint swelling, myalgias, nausea, neck pain, numbness, rash, vomiting or weakness.       Review of Systems   Constitutional: Negative.  Negative for chills, fatigue and fever.   HENT: Negative.  Negative for congestion, mouth sores and trouble swallowing.    Eyes: Negative.  Negative for photophobia, redness and visual disturbance.   Respiratory: Negative.  Negative for cough, shortness of breath and wheezing.    Cardiovascular: Negative.  Negative for chest pain and leg swelling.   Gastrointestinal: Negative.  Negative for abdominal distention, abdominal pain, diarrhea, nausea and vomiting.  "  Genitourinary: Negative.  Negative for dysuria, flank pain, frequency and urgency.   Musculoskeletal: Positive for arthralgias. Negative for back pain, gait problem, joint swelling, myalgias, neck pain and neck stiffness.   Skin: Negative.  Negative for rash.   Neurological: Negative.  Negative for dizziness, weakness and numbness.         Objective:   BP (!) 141/71   Pulse 71   Ht 6' 3" (1.905 m)   Wt 84.1 kg (185 lb 6.5 oz)   BMI 23.17 kg/m²      Physical Exam   Constitutional: He is oriented to person, place, and time and well-developed, well-nourished, and in no distress. No distress.   HENT:   Head: Normocephalic and atraumatic.   Right Ear: External ear normal.   Left Ear: External ear normal.   Mouth/Throat: No oropharyngeal exudate.   Eyes: Conjunctivae and EOM are normal. Pupils are equal, round, and reactive to light. No scleral icterus.   Neck: Neck supple. No thyromegaly present.   Cardiovascular: Normal rate, regular rhythm and normal heart sounds.    No murmur heard.  Pulmonary/Chest: Effort normal and breath sounds normal. No respiratory distress.   Abdominal: Soft. Bowel sounds are normal.       Right Side Rheumatological Exam     Examination finds the shoulder, elbow, wrist, 1st PIP, 1st MCP, 2nd PIP, 2nd MCP, 3rd PIP, 3rd MCP, 4th PIP, 4th MCP, 5th PIP and 5th MCP normal.    The patient is tender to palpation of the knee    Joint Exam Comments   Knee: Mild ttp to medial femoral condyle area  No swelling, warmth or erythema  Motion normal  No crepitus          Left Side Rheumatological Exam     Examination finds the shoulder, elbow, wrist, knee, 1st PIP, 1st MCP, 2nd PIP, 2nd MCP, 3rd PIP, 3rd MCP, 4th PIP, 4th MCP, 5th PIP and 5th MCP normal.      Lymphadenopathy:     He has no cervical adenopathy.   Neurological: He is alert and oriented to person, place, and time. No cranial nerve deficit. He exhibits normal muscle tone. Gait normal. Coordination normal.   Skin: Skin is warm and dry. "     Musculoskeletal: Normal range of motion. He exhibits tenderness. He exhibits no edema.   No triggering today  He does have early Dupuytren's contracture bilateral third fingers  No synovitis on exam              Recent Results (from the past 168 hour(s))   C-reactive protein    Collection Time: 10/30/17  9:40 AM   Result Value Ref Range    CRP 0.8 0.0 - 8.2 mg/L   Sedimentation rate, manual    Collection Time: 10/30/17  9:40 AM   Result Value Ref Range    Sed Rate 2 0 - 10 mm/Hr              Assessment:       1. PMR (polymyalgia rheumatica)    2. Primary osteoarthritis involving multiple joints    3. Chronic kidney disease, stage 3          1. PMR-  mild recurrence of polymyalgia rheumatica after being off prednisone for 2 years- returns with will shoulder pain, gluteal pain, mild stiffness X 20 min, no synovitis- negative CCP-slight increase in sedimentation rate- dong better  back on prednisone initially  10 mg daily now weaned down to 5 mg daily.   Esr 2  no signs of activity     2. Trigger finger will middle fingers-resolved post tendon sheath injection    3. CKD and DM  not able to take nsaids    4. OA generalized    5. Dupuytren's contracture- early and mild will middle flexor tendon    6. Mild medial knee pain- may be mild tendonitis or mild oa    Plan:         Drop prednisone to 2.5 mg in am    Topical voltaren gel to left knee qid prn     Continue to Try tummeric 1500 mg  Continue glucosamine and fish oil  Tylenol for pain , avoid nonsteroidals    At home exercise for dupuytren contracture, paraffin and range of motion exercises      rtc 16 weeks  with CBC, CMP, sedimentation rate and CRP

## 2017-11-01 ENCOUNTER — TELEPHONE (OUTPATIENT)
Dept: FAMILY MEDICINE | Facility: CLINIC | Age: 77
End: 2017-11-01

## 2017-11-01 ENCOUNTER — PATIENT MESSAGE (OUTPATIENT)
Dept: FAMILY MEDICINE | Facility: CLINIC | Age: 77
End: 2017-11-01

## 2017-11-01 NOTE — TELEPHONE ENCOUNTER
----- Message from Ting Perez sent at 11/1/2017  9:22 AM CDT -----  Contact: pt wife  Calling in regards to the biopsy results and to please advise [phone]

## 2017-11-01 NOTE — TELEPHONE ENCOUNTER
Pathology with tubular adenoma and serrated adenoma.  No evidence of cancer.  Per previous conversation with Dr. Brandt he wanted to consider possible partial colon resection due to morphology and persistence of one of the adenomatous polyps.  I'll defer final decision on follow-up and arrangement of appointment with colorectal surgeon to Dr. Brandt.  Spoke with patient.

## 2017-11-01 NOTE — TELEPHONE ENCOUNTER
----- Message from Josselin Avalos sent at 11/1/2017  3:07 PM CDT -----  Contact: self   Patient would like to know test results. Please call back at [phone].          Thanks,  Josselin Avalos

## 2017-11-06 RX ORDER — AMLODIPINE BESYLATE 10 MG/1
TABLET ORAL
Qty: 90 TABLET | Refills: 3 | Status: SHIPPED | OUTPATIENT
Start: 2017-11-06 | End: 2018-01-04 | Stop reason: CLARIF

## 2017-11-08 ENCOUNTER — TELEPHONE (OUTPATIENT)
Dept: FAMILY MEDICINE | Facility: CLINIC | Age: 77
End: 2017-11-08

## 2017-11-08 DIAGNOSIS — Z86.010 H/O ADENOMATOUS POLYP OF COLON: Primary | ICD-10-CM

## 2017-11-08 NOTE — TELEPHONE ENCOUNTER
----- Message from Yadira Perez sent at 11/8/2017  8:41 AM CST -----  Contact: pt   Pt calling to see if office has made his outside appt ,,, please call pt wife back at    [phone] (h)

## 2017-11-09 NOTE — TELEPHONE ENCOUNTER
I called and discussed his findings with him.  He has persistent adenomatous tissue noted at the distal ascending colon area that is of concern. In that area, I had fulgurated the remaining tissue and discussed with him how I would like to have him see Dr. Cooper for further evaluation and treatment of this area in the future.  I assume that this will involve a short term follow up endoscopy but could include a surgery in the future if the lesion cannot be eradicated.  He has increased risks of surgery due to other medical issues.      At this point, a referral to Dr. Cooper needs to be made and an appointment needs to be arranged so that Mr. Greenberg can see him and make further plans in the future for evaluation.  I will place the order.  Call the patient and book the appointment.

## 2017-11-21 NOTE — PROGRESS NOTES
He is being referred to Dr. Cooper for further evaluation on his next colonoscopy.  A message has been sent to Dr. Cooper and the patient and I spoke.

## 2017-12-22 ENCOUNTER — OFFICE VISIT (OUTPATIENT)
Dept: SURGERY | Facility: CLINIC | Age: 77
End: 2017-12-22
Payer: MEDICARE

## 2017-12-22 VITALS
SYSTOLIC BLOOD PRESSURE: 138 MMHG | WEIGHT: 180.75 LBS | BODY MASS INDEX: 22.6 KG/M2 | DIASTOLIC BLOOD PRESSURE: 64 MMHG | HEART RATE: 70 BPM

## 2017-12-22 DIAGNOSIS — K63.5 POLYP OF COLON, UNSPECIFIED PART OF COLON, UNSPECIFIED TYPE: Primary | ICD-10-CM

## 2017-12-22 PROCEDURE — 99204 OFFICE O/P NEW MOD 45 MIN: CPT | Mod: 57,S$GLB,, | Performed by: COLON & RECTAL SURGERY

## 2017-12-22 PROCEDURE — 99999 PR PBB SHADOW E&M-EST. PATIENT-LVL III: CPT | Mod: PBBFAC,,, | Performed by: COLON & RECTAL SURGERY

## 2017-12-22 RX ORDER — HEPARIN SODIUM 5000 [USP'U]/ML
5000 INJECTION, SOLUTION INTRAVENOUS; SUBCUTANEOUS
Status: CANCELLED | OUTPATIENT
Start: 2017-12-22

## 2017-12-22 RX ORDER — MUPIROCIN 20 MG/G
OINTMENT TOPICAL
Status: CANCELLED | OUTPATIENT
Start: 2017-12-22

## 2017-12-22 RX ORDER — ACETAMINOPHEN 10 MG/ML
1000 INJECTION, SOLUTION INTRAVENOUS
Status: CANCELLED | OUTPATIENT
Start: 2017-12-22 | End: 2017-12-22

## 2017-12-22 RX ORDER — SODIUM CHLORIDE, SODIUM LACTATE, POTASSIUM CHLORIDE, CALCIUM CHLORIDE 600; 310; 30; 20 MG/100ML; MG/100ML; MG/100ML; MG/100ML
INJECTION, SOLUTION INTRAVENOUS CONTINUOUS
Status: CANCELLED | OUTPATIENT
Start: 2017-12-22

## 2017-12-22 RX ORDER — METRONIDAZOLE 500 MG/1
TABLET ORAL
Qty: 3 TABLET | Refills: 0 | Status: ON HOLD | OUTPATIENT
Start: 2017-12-22 | End: 2018-01-10 | Stop reason: HOSPADM

## 2017-12-22 RX ORDER — ALVIMOPAN 12 MG/1
12 CAPSULE ORAL
Status: CANCELLED | OUTPATIENT
Start: 2017-12-22 | End: 2017-12-28

## 2017-12-22 RX ORDER — NEOMYCIN SULFATE 500 MG/1
TABLET ORAL
Qty: 6 TABLET | Refills: 0 | Status: ON HOLD | OUTPATIENT
Start: 2017-12-22 | End: 2018-01-10 | Stop reason: HOSPADM

## 2017-12-22 RX ORDER — METRONIDAZOLE 500 MG/100ML
500 INJECTION, SOLUTION INTRAVENOUS
Status: CANCELLED | OUTPATIENT
Start: 2017-12-22

## 2017-12-22 NOTE — LETTER
December 23, 2017      Erik Brandt MD  [address] [facility] [address]           [facility]  [address]  [place] [address]  Phone: [phone]          Patient: Jagdeep Greenberg   MR Number: [id number]   YOB: 1940   Date of Visit: 12/22/2017       Dear Dr. Erik Brandt:    Thank you for referring Jagdeep Greenberg to me for evaluation. Attached you will find relevant portions of my assessment and plan of care.    If you have questions, please do not hesitate to call me. I look forward to following Jagdeep Greenberg along with you.    Sincerely,    Nghia Cooper MD    Enclosure  CC:  No Recipients    If you would like to receive this communication electronically, please contact [email or web address] or [phone] to request more information on [facility] access.    For providers and/or their staff who would like to refer a patient to [facility], please contact us through our one-stop-shop provider referral line, [facility], at [phone].    If you feel you have received this communication in error or would no longer like to receive these types of communications, please e-mail [email or web address]

## 2017-12-22 NOTE — H&P
History & Physical    SUBJECTIVE:     Chief Complaint: Colon Polyp    History of Present Illness:  Patient is a 77 y.o. male who presents for preoperative evaluation. He is scheduled for colonoscopy, possible polypectomy, poss lap right for the treatment of unresectable colon polyp.    77 M underwent colonoscopy 10/24/2017. Several polyps were identified. 1mm in cecum, 1mm in ascending colon, 4mm in distal ascending colon, 2mm in transverse colon and  12mm in hepatic flexure was not resectable      Path: Cecum polyp, biopsy:  Tubular adenoma.  2. Proximal ascending colon polyp, biopsy:  Tubular adenoma.  3. Ascending colon polyp, biopsy:  Sessile serrated polyp.  Negative for dysplasia.  4. Ascending colon, abnormal mucosa, biopsy:  Fragments of tubular adenoma.  5. Transverse colon polyp, biopsy:    Denies any rectal bleeding or abdominal pain. Has prostate removed 15 years ago. Denies any other abdominal surgeries.     Review of patient's allergies indicates:  No Known Allergies    Past Medical History:   Diagnosis Date    Adenomatous polyp of colon     Arthritis     Carotid artery plaque     Less than 50%    Chronic kidney disease, stage 3     Diabetes mellitus, type 2     Erectile dysfunction following radical prostatectomy     Fractures     Hyperlipidemia LDL goal < 100     Hypertension goal BP (blood pressure) < 130/80     Lacunar stroke 8/17/2017    MCI (mild cognitive impairment)     Parkinsonism 4/11/2017    Prostate cancer 2001    Stroke      Past Surgical History:   Procedure Laterality Date    APPENDECTOMY      COLONOSCOPY  In 3/2/2010    Repeat 5 years    COLONOSCOPY N/A 9/29/2015    Procedure: COLONOSCOPY;  Surgeon: Erik Brandt MD;  Location: [facility];  Service: Endoscopy;  Laterality: N/A;    COLONOSCOPY N/A 10/11/2016    Procedure: COLONOSCOPY;  Surgeon: Erik Brandt MD;  Location: [facility];  Service: Endoscopy;  Laterality: N/A;    COLONOSCOPY N/A 10/24/2017     Procedure: COLONOSCOPY;  Surgeon: Erik Brandt MD;  Location[facility];  Service: Endoscopy;  Laterality: N/A;    FRACTURE SURGERY      HERNIA REPAIR      LYMPH NODE DISSECTION      Pelvic    PROSTATECTOMY      SHOULDER ARTHROSCOPY      Right     Family History   Problem Relation Age of Onset    Leukemia Father 68     Social History   Substance Use Topics    Smoking status: Never Smoker    Smokeless tobacco: Never Used    Alcohol use 21.0 oz/week     14 Cans of beer, 21 Shots of liquor per week        Review of Systems:  Constitutional: no fever or chills, pain well controlled  Respiratory: no cough or shortness of breath  Cardiovascular: no chest pain or palpitations  Gastrointestinal: no nausea or vomiting, tolerating diet  Hematologic/Lymphatic: no easy bruising or lymphadenopathy  Musculoskeletal: no arthralgias or myalgias  Neurological: no seizures or tremors    OBJECTIVE:     Vital Signs (Most Recent)  Pulse: 70 (12/22/17 1203)  BP: 138/64 (12/22/17 1203)    Physical Exam:  General: White male in NAD sitting in chair in clinic  Neuro: aaox4 maex4 perrl  Respiratory: resps even unlabored  Cardiac: cap refill <2 sec  Abdomen: Normal, benign.  Extremities: Warm dry and intact  Anorectal: deferred      ASSESSMENT/PLAN:     Admission paperwork was accomplished including operative consent and consent for blood and blood product administration.    The procedure was explained to the patient including indications, risks and alternatives. The patient verbalized understanding and has given informed consent.    Plan:  Proceed with operative procedure as planned.    Follow Up:  After hospitalization.    Plan for OR 01/9/2018

## 2017-12-23 NOTE — PROGRESS NOTES
See history and physical from today.  Patient is counseled for attempted colonoscopic polypectomy or polyp destruction versus possible laparoscopic-assisted polypectomy or possible laparoscopic colectomy.  The rationale and risks and benefits of this approach is discussed.  Questions answered.    I have personally taken the history and examined this patient and agree with the nurse practitioner's history and physical exam note.

## 2017-12-26 ENCOUNTER — TELEPHONE (OUTPATIENT)
Dept: FAMILY MEDICINE | Facility: CLINIC | Age: 77
End: 2017-12-26

## 2017-12-26 ENCOUNTER — ANESTHESIA EVENT (OUTPATIENT)
Dept: SURGERY | Facility: HOSPITAL | Age: 77
End: 2017-12-26
Payer: MEDICARE

## 2017-12-26 DIAGNOSIS — Z01.818 PREOP TESTING: Primary | ICD-10-CM

## 2017-12-26 DIAGNOSIS — Z01.818 PRE-OPERATIVE CLEARANCE: ICD-10-CM

## 2017-12-26 DIAGNOSIS — I15.2 HYPERTENSION ASSOCIATED WITH DIABETES: Primary | ICD-10-CM

## 2017-12-26 DIAGNOSIS — E11.59 HYPERTENSION ASSOCIATED WITH DIABETES: Primary | ICD-10-CM

## 2017-12-26 NOTE — TELEPHONE ENCOUNTER
----- Message from Glory Gaytan LPN sent at 12/26/2017 11:03 AM CST -----  Regarding: FW: baby aspirin instructions       ----- Message -----  From: Hussein Chery MA  Sent: 12/26/2017  10:50 AM  To: April Villagran RN, Casi De La Cruz RN, #  Subject: baby aspirin instructions                        The patient indicated above is currently prescribed the blood thinner, baby aspirin and is scheduled for a procedure requiring anesthesia on 1/9/2018.  Please provide stop time instructions, by entering a note in EPIC. Please respond to this request within 48 hours to prevent delays or cancellations. If further information is desired about this request, please contact Casi De La Cruz RN navigating the perioperative care of your patient.

## 2017-12-26 NOTE — TELEPHONE ENCOUNTER
Make sure not having any chest pain or shortness of breath.  Please get an EKG.  So long as these are okay we will be able to clear him for surgery.  Would recommend stopping aspirin 1 week prior to procedure

## 2017-12-26 NOTE — ANESTHESIA PREPROCEDURE EVALUATION
"  Anesthesia Assessment: Preoperative EQUATION    Planned Procedure: Procedure(s) (LRB):  COLONOSCOPY - OPERATIVE (N/A)  POLYPECTOMY (N/A)  COLECTOMY-LAPAROSCOPIC RIGHT (N/A)  Requested Anesthesia Type:General  Surgeon: Nghia Cooper MD  Service: Colon and Rectal  Known or anticipated Date of Surgery:1/9/2018    Surgeon notes: reviewed and Polyp of colon, unspecified part of colon, unspecified type    Previous anesthesia records:10/24/17-Colonoscopy-MAC- no apparent anesthetic complications    Last PCP note: within 3 months , within [facility] , Annual Exam  Subspecialty notes: Neurology, Rheumatology, Psychiatry     Tests already available:  Results have been reviewed.labs-10/30/17  & 10/24/17  Plan:    Testing:  Hematology Profile, CMP and T&S                PCP- has Preop Clearance appt. for 12/28/17 @ 9:45a &  with order in for EKG -PENDING      Patient  has previously scheduled Medical Appointment:Appt. on 12/28/17, prior to surgery date.    Navigation: Tests Scheduled. Lab-Hem Prof/CMP/T&S on 1/4/18 @ 10:30a             Consults scheduled.POC on 1/4/18 @ 11a & PCP for "clearance" on 12/28/17- & ekg ordered by PCP-PENDING             Results will be tracked by Preop Clinic.                 Casi De La Cruz RN  12/26/17      Addendum: A1c ordered and linked to lab appt. On 1/4/18 for 10:30a per Dr.Leopolds' request.  Casi De La Cruz RN  1/4/18 12/26/2017  Jagdeep Greenberg is a 77 y.o., male.    Anesthesia Evaluation         Review of Systems  Anesthesia Hx:  No problems with previous Anesthesia History of prior surgery of interest to airway management or planning: Previous anesthesia: General colonoscopy 10/2017 with general anesthesia.  Procedure performed at an [facility].   Social:  Alcohol Use, Non-Smoker    Hematology/Oncology:         -- Cancer in past history: surgery  Oncology " Comments: History of Prostate cancer-s/p Prostatectomy-15 yrs. ago     EENT/Dental:   Reading glasses   Cardiovascular:   Hypertension hyperlipidemia  Functional Capacity good / => 4 METS, walking-works at [employer]; climbs stairs to get into home; denies CP, SOB    Pulmonary:   Denies Asthma.  Denies Sleep Apnea.    Renal/:   Chronic Renal Disease    Hepatic/GI:   Denies GERD. Colon polyp   Musculoskeletal:   Arthritis  H/o polymyalgia rheumatica-takes Prednisone   Neurological:   CVA (approx 2 yrs ago; found on scan; denies symptoms) Mild stroke-maybe x2 years ago-no residual-Lacunar stroke/ Parkinsonism   Endocrine:   Diabetes (A1C 6.2 1/4/18), type 2    Psych:  Psychiatric Normal           Physical Exam  General:  Well nourished    Airway/Jaw/Neck:  Airway Findings: Mouth Opening: Normal Tongue: Normal  General Airway Assessment: Adult  Jaw/Neck Findings:     Neck ROM: Normal ROM      Dental:  Dental Findings: molar caps   Chest/Lungs:  Chest/Lungs Findings: Clear to auscultation, Normal Respiratory Rate     Heart/Vascular:  Heart Findings: Rate: Normal  Rhythm: Regular Rhythm  Sounds: Normal        Mental Status:  Mental Status Findings:  Cooperative, Alert and Oriented       Casi De La Cruz RN 12/26/17/Addended 1/4/18/April Villagran RN  Pt was seen in POC 1/4/18; pending clearance from PCP, Dr Contreras/April Villagran RN    Addendum 1/4/18 1739: PCP clearance in epic/April Villagran RN        Anesthesia Plan  Type of Anesthesia, risks & benefits discussed:  Anesthesia Type:  general  Patient's Preference:   Intra-op Monitoring Plan: standard ASA monitors  Intra-op Monitoring Plan Comments:   Post Op Pain Control Plan:   Post Op Pain Control Plan Comments:   Induction:   IV  Beta Blocker:  Patient is not currently on a Beta-Blocker (No further documentation required).       Informed Consent: Patient understands risks and agrees with Anesthesia plan.  Questions answered. Anesthesia consent signed with  patient.  ASA Score: 3     Day of Surgery Review of History & Physical:    H&P update referred to the surgeon.         Ready For Surgery From Anesthesia Perspective.

## 2017-12-26 NOTE — TELEPHONE ENCOUNTER
"----- Message from Hansa Will LPN sent at 12/26/2017 11:23 AM CST -----  Regarding: FW: Surgery Clearance   Please advise  ----- Message -----  From: Hussein Chery MA  Sent: 12/26/2017  10:44 AM  To: April Villagran, RN, Casi De La Cruz RN, #  Subject: Surgery Clearance                                Patient is having a operative-Colonscopy, Colectomy-laparoscopic right, with Dr Cooper on 1/9/2018. Requesting a "clearance" from you, prior to surgery date. Patient was last seen by you on 10/9/2017. Await your reply. Thank You. Sincerely Hussein Chery MA Pre-op/Anesthesia ext [phone]     "

## 2017-12-26 NOTE — PRE ADMISSION SCREENING
"Anesthesia Assessment: Preoperative EQUATION    Planned Procedure: Procedure(s) (LRB):  COLONOSCOPY - OPERATIVE (N/A)  POLYPECTOMY (N/A)  COLECTOMY-LAPAROSCOPIC RIGHT (N/A)  Requested Anesthesia Type:General  Surgeon: Nghia Cooper MD  Service: Colon and Rectal  Known or anticipated Date of Surgery:1/9/2018    Surgeon notes: reviewed and Polyp of colon, unspecified part of colon, unspecified type    Previous anesthesia records:10/24/17-Colonoscopy-MAC- no apparent anesthetic complications    Last PCP note: within 3 months , within [facility] , Annual Exam  Subspecialty notes: Neurology, Rheumatology, Psychiatry     Tests already available:  Results have been reviewed.labs-10/30/17  & 10/24/17  Plan:    Testing:  Hematology Profile, CMP and T&S                PCP- has Preop Clearance appt. for 12/28/17 @ 9:45a &  with order in for EKG -PENDING      Patient  has previously scheduled Medical Appointment:Appt. on 12/28/17, prior to surgery date.    Navigation: Tests Scheduled. Lab-Hem Prof/CMP/T&S on 1/4/18 @ 10:30a             Consults scheduled.POC on 1/4/18 @ 11a & PCP for "clearance" on 12/28/17- & ekg ordered by PCP-PENDING             Results will be tracked by Preop Clinic.                 Casi De La Cruz RN  12/26/17  "

## 2017-12-28 ENCOUNTER — CLINICAL SUPPORT (OUTPATIENT)
Dept: FAMILY MEDICINE | Facility: CLINIC | Age: 77
End: 2017-12-28
Payer: MEDICARE

## 2017-12-28 DIAGNOSIS — I15.2 HYPERTENSION ASSOCIATED WITH DIABETES: ICD-10-CM

## 2017-12-28 DIAGNOSIS — E11.59 HYPERTENSION ASSOCIATED WITH DIABETES: ICD-10-CM

## 2017-12-28 DIAGNOSIS — Z01.818 PRE-OPERATIVE CLEARANCE: Primary | ICD-10-CM

## 2017-12-28 PROCEDURE — 93010 ELECTROCARDIOGRAM REPORT: CPT | Mod: S$GLB,,, | Performed by: INTERNAL MEDICINE

## 2017-12-28 PROCEDURE — 93005 ELECTROCARDIOGRAM TRACING: CPT | Mod: S$GLB,,, | Performed by: FAMILY MEDICINE

## 2017-12-29 ENCOUNTER — TELEPHONE (OUTPATIENT)
Dept: SURGERY | Facility: CLINIC | Age: 77
End: 2017-12-29

## 2017-12-29 NOTE — TELEPHONE ENCOUNTER
----- Message from Sharath Barron sent at 12/29/2017  9:00 AM CST -----  Contact: Pt:[phone]  Pt called and states he would like to speak with the nurse in regards to his prep tablets. Pt states it does not correspond with what he's seen at the pharmacy. Pt would like to speak with the nurse.

## 2017-12-29 NOTE — TELEPHONE ENCOUNTER
Spoke with patient.  He informed me that he spoke to someone and found out that the medication that he is supposed to take was the correct one.

## 2018-01-04 ENCOUNTER — TELEPHONE (OUTPATIENT)
Dept: FAMILY MEDICINE | Facility: CLINIC | Age: 78
End: 2018-01-04

## 2018-01-04 ENCOUNTER — HOSPITAL ENCOUNTER (OUTPATIENT)
Dept: PREADMISSION TESTING | Facility: HOSPITAL | Age: 78
Discharge: HOME | End: 2018-01-04
Attending: ANESTHESIOLOGY
Payer: MEDICARE

## 2018-01-04 VITALS
OXYGEN SATURATION: 99 % | HEIGHT: 75 IN | TEMPERATURE: 98 F | HEART RATE: 75 BPM | WEIGHT: 184.88 LBS | RESPIRATION RATE: 18 BRPM | DIASTOLIC BLOOD PRESSURE: 69 MMHG | BODY MASS INDEX: 22.99 KG/M2 | SYSTOLIC BLOOD PRESSURE: 149 MMHG

## 2018-01-04 DIAGNOSIS — E11.9 TYPE 2 DIABETES MELLITUS WITHOUT COMPLICATIONS: ICD-10-CM

## 2018-01-04 DIAGNOSIS — Z01.818 PREOP TESTING: Primary | ICD-10-CM

## 2018-01-04 NOTE — DISCHARGE INSTRUCTIONS
Your surgery has been scheduled for:__________________________________________    You should report to:  ____[facility], located on the [place] side of the first floor of the           [facility] ([phone])  ____[facility], located on the [facility] side of the            Second floor of the [facility] [phone])  ____3rd Floor [facility] located on the [facility] side of the [facility] [phone]  Please Note   - Tell your doctor if you take Aspirin, products containing Aspirin, herbal medications  or blood thinners, such as Coumadin, Ticlid, or Plavix.  (Consult your provider regarding holding or stopping before surgery).  - Arrange for someone to drive you home following surgery.  You will not be allowed to leave the surgical facility alone or drive yourself home following sedation and anesthesia.  Before Surgery  - Stop taking all herbal medications 14days prior to surgery  - No Motrin/Advil (Ibuprofen) 7 days before surgery  - No Aleve (Naproxen) 7 days before surgery  - Stop Taking Asprin, products containing Asprin _____days before surgery  - Stop taking blood thinners_______days before surgery  - Refrain from drinking alcoholic beverages for 24hours before and after surgery  - Stop or limit smoking _________days before surgery  Night before Surgery  - DO NOT EAT OR DRINK ANYTHING AFTER MIDNIGHT, INCLUDING GUM, HARD CANDY, MINTS, OR CHEWING TOBACCO.  - Take a shower or bath (shower is recommended).  Bathe with Hibiclens soap or an antibacterial soap from the neck down.  If not supplied by your surgeon, hibiclens soap will need to be purchased over the counter in pharmacy.  Rinse soap off thoroughly.  - Shampoo your hair with your regular shampoo  The Day of Surgery  - Take another bath or shower with hibiclens or any antibacterial soap, to reduce the chance of infection.  - Take heart and blood  pressure medications with a small sip of water, as advised by the perioperative team.  - Do not take fluid pills  - You may brush your teeth and rinse your mouth, but do not swall any additional water.   - Do not apply perfumes, powder, body lotions or deodorant on the day of surgery.  - Nail polish should be removed.  - Do not wear makeup or moisturizer  - Wear comfortable clothes, such as a button front shirt and loose fitting pants.  - Leave all jewelry, including body piercings, and valuables at home.    - Bring any devices you will neeed after surgery such as crutches or canes.  - If you have sleep apnea, please bring your CPAP machine  In the event that your physical condition changes including the onset of a cold or respiratory illness, or if you have to delay or cancel your surgery, please notify your surgeon.Anesthesia: General Anesthesia  Youre due to have surgery. During surgery, youll be given medication called anesthesia. (It is also called anesthetic.) This will keep you comfortable and pain-free. Your anesthesia provider will use general anesthesia. This sheet tells you more about it.  What is general anesthesia?     You are watched continuously during your procedure by the anesthesia provider   General anesthesia puts you into a state like deep sleep. It goes into the bloodstream (IV anesthetics), into the lungs (gas anesthetics), or both. You feel nothing during the procedure. You will not remember it. During the procedure, the anesthesia provider monitors you continuously. He or she checks your heart rate and rhythm, blood pressure, breathing, and blood oxygen.  · IV Anesthetics. IV anesthetics are given through an IV line in your arm. Theyre often given first. This is so you are asleep before a gas anesthetic is started. Some kinds of IV anesthetics relieve pain. Others relax you. Your doctor will decide which kind is best in your case.  · Gas Anesthetics. Gas anesthetics are breathed into the  lungs. They are often used to keep you asleep. They can be given through a facemask or a tube placed in your larynx or trachea (breathing tube).  ? If you have a facemask, your anesthesia provider will most likely place it over your nose and mouth while youre still awake. Youll breathe oxygen through the mask as your IV anesthetic is started. Gas anesthetic may be added through the mask.  ? If you have a tube in the larynx or trachea, it will be inserted into your throat after youre asleep.  Anesthesia tools and medications  You will likely have:  · IV anesthetics. These are put into an IV line into your bloodstream.  · Gas anesthetics. You breathe these anesthetics into your lungs, where they pass into your bloodstream.  · Pulse oximeter. This is a small clip that is attached to the end of your finger. This measures your blood oxygen level.  · Electrocardiography leads (electrodes). These are small sticky pads that are placed on your chest. They record your heart rate and rhythm.  · Blood pressure cuff. This reads your blood pressure.  Risks and possible complications  General anesthesia has some risks. These include:  · Breathing problems  · Nausea and vomiting  · Sore throat or hoarseness (usually temporary)  · Allergic reaction to the anesthetic  · Irregular heartbeat (rare)  · Cardiac arrest (rare)   Anesthesia safety  · Follow all instructions you are given for how long not to eat or drink before your procedure.  · Be sure your doctor knows what medications and drugs you take. This includes over-the-counter medications, herbs, supplements, alcohol or other drugs. You will be asked when those were last taken.  · Have an adult family member or friend drive you home after the procedure.  · For the first 24 hours after your surgery:  ? Do not drive or use heavy equipment.  ? Have a trusted family member or spouse make important decisions or sign documents.  ? Avoid alcohol.  ? Have a responsible adult stay with  you. He or she can watch for problems and help keep you safe.  Date Last Reviewed: 10/16/2014  © 8956-2877 [employer]. [address] [place] [address], [place] [address]. All rights reserved. This information is not intended as a substitute for professional medical care. Always follow your healthcare professional's instructions.

## 2018-01-08 ENCOUNTER — TELEPHONE (OUTPATIENT)
Dept: SURGERY | Facility: CLINIC | Age: 78
End: 2018-01-08

## 2018-01-09 ENCOUNTER — HOSPITAL ENCOUNTER (OUTPATIENT)
Facility: HOSPITAL | Age: 78
Discharge: HOME | End: 2018-01-10
Attending: COLON & RECTAL SURGERY | Admitting: COLON & RECTAL SURGERY
Payer: MEDICARE

## 2018-01-09 ENCOUNTER — ANESTHESIA (OUTPATIENT)
Dept: SURGERY | Facility: HOSPITAL | Age: 78
End: 2018-01-09
Payer: MEDICARE

## 2018-01-09 DIAGNOSIS — K63.5 POLYP OF COLON, UNSPECIFIED PART OF COLON, UNSPECIFIED TYPE: ICD-10-CM

## 2018-01-09 LAB
POCT GLUCOSE: 103 MG/DL (ref 70–110)
POCT GLUCOSE: 127 MG/DL (ref 70–110)
POCT GLUCOSE: 179 MG/DL (ref 70–110)

## 2018-01-09 PROCEDURE — 82962 GLUCOSE BLOOD TEST: CPT | Mod: 91 | Performed by: COLON & RECTAL SURGERY

## 2018-01-09 PROCEDURE — 27201028 HC NEEDLE, SCLERO: Performed by: COLON & RECTAL SURGERY

## 2018-01-09 PROCEDURE — S0030 INJECTION, METRONIDAZOLE: HCPCS | Performed by: NURSE PRACTITIONER

## 2018-01-09 PROCEDURE — D9220A PRA ANESTHESIA: Mod: CRNA,,, | Performed by: NURSE ANESTHETIST, CERTIFIED REGISTERED

## 2018-01-09 PROCEDURE — 94761 N-INVAS EAR/PLS OXIMETRY MLT: CPT

## 2018-01-09 PROCEDURE — 63600175 PHARM REV CODE 636 W HCPCS: Performed by: NURSE ANESTHETIST, CERTIFIED REGISTERED

## 2018-01-09 PROCEDURE — 25000003 PHARM REV CODE 250: Performed by: NURSE ANESTHETIST, CERTIFIED REGISTERED

## 2018-01-09 PROCEDURE — 25000003 PHARM REV CODE 250: Performed by: NURSE PRACTITIONER

## 2018-01-09 PROCEDURE — 27201012 HC FORCEPS, HOT/COLD, DISP: Performed by: COLON & RECTAL SURGERY

## 2018-01-09 PROCEDURE — 45390 COLONOSCOPY W/RESECTION: CPT | Mod: ,,, | Performed by: COLON & RECTAL SURGERY

## 2018-01-09 PROCEDURE — D9220A PRA ANESTHESIA: Mod: ANES,,, | Performed by: ANESTHESIOLOGY

## 2018-01-09 PROCEDURE — 63600175 PHARM REV CODE 636 W HCPCS: Performed by: NURSE PRACTITIONER

## 2018-01-09 PROCEDURE — 37000008 HC ANESTHESIA 1ST 15 MINUTES: Performed by: COLON & RECTAL SURGERY

## 2018-01-09 PROCEDURE — 82962 GLUCOSE BLOOD TEST: CPT | Performed by: COLON & RECTAL SURGERY

## 2018-01-09 PROCEDURE — 25000003 PHARM REV CODE 250: Performed by: SURGERY

## 2018-01-09 PROCEDURE — 36000707: Performed by: COLON & RECTAL SURGERY

## 2018-01-09 PROCEDURE — 36000706: Performed by: COLON & RECTAL SURGERY

## 2018-01-09 PROCEDURE — 25000003 PHARM REV CODE 250: Performed by: COLON & RECTAL SURGERY

## 2018-01-09 PROCEDURE — 37000009 HC ANESTHESIA EA ADD 15 MINS: Performed by: COLON & RECTAL SURGERY

## 2018-01-09 PROCEDURE — 25000003 PHARM REV CODE 250: Performed by: STUDENT IN AN ORGANIZED HEALTH CARE EDUCATION/TRAINING PROGRAM

## 2018-01-09 PROCEDURE — 27201089 HC SNARE, DISP (ANY): Performed by: COLON & RECTAL SURGERY

## 2018-01-09 PROCEDURE — 27000221 HC OXYGEN, UP TO 24 HOURS

## 2018-01-09 PROCEDURE — 27201423 OPTIME MED/SURG SUP & DEVICES STERILE SUPPLY: Performed by: COLON & RECTAL SURGERY

## 2018-01-09 PROCEDURE — 71000039 HC RECOVERY, EACH ADD'L HOUR: Performed by: COLON & RECTAL SURGERY

## 2018-01-09 PROCEDURE — 71000033 HC RECOVERY, INTIAL HOUR: Performed by: COLON & RECTAL SURGERY

## 2018-01-09 PROCEDURE — 94799 UNLISTED PULMONARY SVC/PX: CPT

## 2018-01-09 PROCEDURE — 88305 TISSUE EXAM BY PATHOLOGIST: CPT | Mod: 26,,, | Performed by: PATHOLOGY

## 2018-01-09 PROCEDURE — 88305 TISSUE EXAM BY PATHOLOGIST: CPT | Performed by: PATHOLOGY

## 2018-01-09 RX ORDER — RAMELTEON 8 MG/1
8 TABLET ORAL NIGHTLY PRN
Status: DISCONTINUED | OUTPATIENT
Start: 2018-01-09 | End: 2018-01-10 | Stop reason: HOSPADM

## 2018-01-09 RX ORDER — PROPOFOL 10 MG/ML
VIAL (ML) INTRAVENOUS
Status: DISCONTINUED | OUTPATIENT
Start: 2018-01-09 | End: 2018-01-09

## 2018-01-09 RX ORDER — GLYCOPYRROLATE 0.2 MG/ML
INJECTION INTRAMUSCULAR; INTRAVENOUS
Status: DISCONTINUED | OUTPATIENT
Start: 2018-01-09 | End: 2018-01-09

## 2018-01-09 RX ORDER — ONDANSETRON 2 MG/ML
INJECTION INTRAMUSCULAR; INTRAVENOUS
Status: DISCONTINUED | OUTPATIENT
Start: 2018-01-09 | End: 2018-01-09

## 2018-01-09 RX ORDER — METFORMIN HYDROCHLORIDE 500 MG/1
1000 TABLET ORAL DAILY
Status: DISCONTINUED | OUTPATIENT
Start: 2018-01-10 | End: 2018-01-10 | Stop reason: HOSPADM

## 2018-01-09 RX ORDER — PHENYLEPHRINE HYDROCHLORIDE 10 MG/ML
INJECTION INTRAVENOUS
Status: DISCONTINUED | OUTPATIENT
Start: 2018-01-09 | End: 2018-01-09

## 2018-01-09 RX ORDER — CALCIUM CARBONATE 200(500)MG
1000 TABLET,CHEWABLE ORAL EVERY 8 HOURS PRN
Status: DISCONTINUED | OUTPATIENT
Start: 2018-01-09 | End: 2018-01-10 | Stop reason: HOSPADM

## 2018-01-09 RX ORDER — METRONIDAZOLE 500 MG/100ML
500 INJECTION, SOLUTION INTRAVENOUS
Status: COMPLETED | OUTPATIENT
Start: 2018-01-09 | End: 2018-01-09

## 2018-01-09 RX ORDER — LIDOCAINE HCL/PF 100 MG/5ML
SYRINGE (ML) INTRAVENOUS
Status: DISCONTINUED | OUTPATIENT
Start: 2018-01-09 | End: 2018-01-09

## 2018-01-09 RX ORDER — MUPIROCIN 20 MG/G
OINTMENT TOPICAL
Status: DISCONTINUED | OUTPATIENT
Start: 2018-01-09 | End: 2018-01-09

## 2018-01-09 RX ORDER — SODIUM CHLORIDE 0.9 % (FLUSH) 0.9 %
3 SYRINGE (ML) INJECTION
Status: DISCONTINUED | OUTPATIENT
Start: 2018-01-09 | End: 2018-01-09 | Stop reason: HOSPADM

## 2018-01-09 RX ORDER — DICLOFENAC SODIUM 10 MG/G
2 GEL TOPICAL 4 TIMES DAILY
Status: DISCONTINUED | OUTPATIENT
Start: 2018-01-09 | End: 2018-01-10 | Stop reason: HOSPADM

## 2018-01-09 RX ORDER — MIDAZOLAM HYDROCHLORIDE 1 MG/ML
INJECTION, SOLUTION INTRAMUSCULAR; INTRAVENOUS
Status: DISCONTINUED | OUTPATIENT
Start: 2018-01-09 | End: 2018-01-09

## 2018-01-09 RX ORDER — ALVIMOPAN 12 MG/1
12 CAPSULE ORAL
Status: COMPLETED | OUTPATIENT
Start: 2018-01-09 | End: 2018-01-09

## 2018-01-09 RX ORDER — BENAZEPRIL HYDROCHLORIDE 20 MG/1
20 TABLET ORAL DAILY
Status: DISCONTINUED | OUTPATIENT
Start: 2018-01-10 | End: 2018-01-10 | Stop reason: HOSPADM

## 2018-01-09 RX ORDER — PRAVASTATIN SODIUM 20 MG/1
20 TABLET ORAL DAILY
Status: DISCONTINUED | OUTPATIENT
Start: 2018-01-10 | End: 2018-01-10 | Stop reason: HOSPADM

## 2018-01-09 RX ORDER — METOCLOPRAMIDE HYDROCHLORIDE 5 MG/ML
5 INJECTION INTRAMUSCULAR; INTRAVENOUS EVERY 6 HOURS PRN
Status: DISCONTINUED | OUTPATIENT
Start: 2018-01-09 | End: 2018-01-10 | Stop reason: HOSPADM

## 2018-01-09 RX ORDER — AMLODIPINE BESYLATE 10 MG/1
10 TABLET ORAL EVERY MORNING
Status: DISCONTINUED | OUTPATIENT
Start: 2018-01-10 | End: 2018-01-10 | Stop reason: HOSPADM

## 2018-01-09 RX ORDER — ACETAMINOPHEN 10 MG/ML
1000 INJECTION, SOLUTION INTRAVENOUS
Status: COMPLETED | OUTPATIENT
Start: 2018-01-09 | End: 2018-01-09

## 2018-01-09 RX ORDER — SODIUM CHLORIDE 9 MG/ML
INJECTION, SOLUTION INTRAVENOUS CONTINUOUS
Status: DISCONTINUED | OUTPATIENT
Start: 2018-01-09 | End: 2018-01-09

## 2018-01-09 RX ORDER — FENTANYL CITRATE 50 UG/ML
INJECTION, SOLUTION INTRAMUSCULAR; INTRAVENOUS
Status: DISCONTINUED | OUTPATIENT
Start: 2018-01-09 | End: 2018-01-09

## 2018-01-09 RX ORDER — DIPHENHYDRAMINE HYDROCHLORIDE 50 MG/ML
12.5 INJECTION INTRAMUSCULAR; INTRAVENOUS EVERY 4 HOURS PRN
Status: DISCONTINUED | OUTPATIENT
Start: 2018-01-09 | End: 2018-01-10 | Stop reason: HOSPADM

## 2018-01-09 RX ORDER — SODIUM CHLORIDE, SODIUM LACTATE, POTASSIUM CHLORIDE, CALCIUM CHLORIDE 600; 310; 30; 20 MG/100ML; MG/100ML; MG/100ML; MG/100ML
INJECTION, SOLUTION INTRAVENOUS CONTINUOUS
Status: DISCONTINUED | OUTPATIENT
Start: 2018-01-09 | End: 2018-01-09

## 2018-01-09 RX ORDER — HEPARIN SODIUM 5000 [USP'U]/ML
5000 INJECTION, SOLUTION INTRAVENOUS; SUBCUTANEOUS
Status: COMPLETED | OUTPATIENT
Start: 2018-01-09 | End: 2018-01-09

## 2018-01-09 RX ORDER — FENTANYL CITRATE 50 UG/ML
25 INJECTION, SOLUTION INTRAMUSCULAR; INTRAVENOUS EVERY 5 MIN PRN
Status: DISCONTINUED | OUTPATIENT
Start: 2018-01-09 | End: 2018-01-09 | Stop reason: HOSPADM

## 2018-01-09 RX ORDER — ASPIRIN 81 MG/1
81 TABLET ORAL DAILY
Status: DISCONTINUED | OUTPATIENT
Start: 2018-01-10 | End: 2018-01-10 | Stop reason: HOSPADM

## 2018-01-09 RX ORDER — CARBIDOPA AND LEVODOPA 25; 100 MG/1; MG/1
1 TABLET ORAL 3 TIMES DAILY
Status: DISCONTINUED | OUTPATIENT
Start: 2018-01-09 | End: 2018-01-09 | Stop reason: DRUGHIGH

## 2018-01-09 RX ORDER — GLIMEPIRIDE 2 MG/1
2 TABLET ORAL
Status: DISCONTINUED | OUTPATIENT
Start: 2018-01-10 | End: 2018-01-10 | Stop reason: HOSPADM

## 2018-01-09 RX ORDER — HYDROCHLOROTHIAZIDE 12.5 MG/1
12.5 TABLET ORAL DAILY
Status: DISCONTINUED | OUTPATIENT
Start: 2018-01-10 | End: 2018-01-10 | Stop reason: HOSPADM

## 2018-01-09 RX ORDER — ROCURONIUM BROMIDE 10 MG/ML
INJECTION, SOLUTION INTRAVENOUS
Status: DISCONTINUED | OUTPATIENT
Start: 2018-01-09 | End: 2018-01-09

## 2018-01-09 RX ORDER — SODIUM CHLORIDE 9 MG/ML
INJECTION, SOLUTION INTRAVENOUS CONTINUOUS PRN
Status: DISCONTINUED | OUTPATIENT
Start: 2018-01-09 | End: 2018-01-09

## 2018-01-09 RX ORDER — ONDANSETRON 2 MG/ML
4 INJECTION INTRAMUSCULAR; INTRAVENOUS EVERY 12 HOURS PRN
Status: DISCONTINUED | OUTPATIENT
Start: 2018-01-09 | End: 2018-01-10 | Stop reason: HOSPADM

## 2018-01-09 RX ORDER — PREDNISONE 5 MG/1
5 TABLET ORAL DAILY
Status: DISCONTINUED | OUTPATIENT
Start: 2018-01-10 | End: 2018-01-10 | Stop reason: HOSPADM

## 2018-01-09 RX ORDER — NEOSTIGMINE METHYLSULFATE 1 MG/ML
INJECTION, SOLUTION INTRAVENOUS
Status: DISCONTINUED | OUTPATIENT
Start: 2018-01-09 | End: 2018-01-09

## 2018-01-09 RX ORDER — MUPIROCIN 20 MG/G
1 OINTMENT TOPICAL 2 TIMES DAILY
Status: DISCONTINUED | OUTPATIENT
Start: 2018-01-09 | End: 2018-01-10 | Stop reason: HOSPADM

## 2018-01-09 RX ADMIN — SODIUM CHLORIDE 10 ML/HR: 0.9 INJECTION, SOLUTION INTRAVENOUS at 11:01

## 2018-01-09 RX ADMIN — MIDAZOLAM HYDROCHLORIDE 1 MG: 1 INJECTION, SOLUTION INTRAMUSCULAR; INTRAVENOUS at 02:01

## 2018-01-09 RX ADMIN — FENTANYL CITRATE 25 MCG: 50 INJECTION, SOLUTION INTRAMUSCULAR; INTRAVENOUS at 03:01

## 2018-01-09 RX ADMIN — LIDOCAINE HYDROCHLORIDE 80 MG: 20 INJECTION, SOLUTION INTRAVENOUS at 02:01

## 2018-01-09 RX ADMIN — NEOSTIGMINE METHYLSULFATE 4 MG: 1 INJECTION INTRAVENOUS at 03:01

## 2018-01-09 RX ADMIN — SODIUM CHLORIDE, SODIUM GLUCONATE, SODIUM ACETATE, POTASSIUM CHLORIDE, MAGNESIUM CHLORIDE, SODIUM PHOSPHATE, DIBASIC, AND POTASSIUM PHOSPHATE: .53; .5; .37; .037; .03; .012; .00082 INJECTION, SOLUTION INTRAVENOUS at 02:01

## 2018-01-09 RX ADMIN — ONDANSETRON 4 MG: 2 INJECTION INTRAMUSCULAR; INTRAVENOUS at 03:01

## 2018-01-09 RX ADMIN — HEPARIN SODIUM 5000 UNITS: 5000 INJECTION, SOLUTION INTRAVENOUS; SUBCUTANEOUS at 11:01

## 2018-01-09 RX ADMIN — MUPIROCIN 1 G: 20 OINTMENT TOPICAL at 09:01

## 2018-01-09 RX ADMIN — PROPOFOL 180 MG: 10 INJECTION, EMULSION INTRAVENOUS at 02:01

## 2018-01-09 RX ADMIN — IBUPROFEN 800 MG: 800 INJECTION INTRAVENOUS at 12:01

## 2018-01-09 RX ADMIN — ALVIMOPAN 12 MG: 12 CAPSULE ORAL at 11:01

## 2018-01-09 RX ADMIN — METRONIDAZOLE 500 MG: 500 INJECTION, SOLUTION INTRAVENOUS at 01:01

## 2018-01-09 RX ADMIN — ROCURONIUM BROMIDE 40 MG: 10 INJECTION, SOLUTION INTRAVENOUS at 02:01

## 2018-01-09 RX ADMIN — GLYCOPYRROLATE 0.6 MG: 0.2 INJECTION, SOLUTION INTRAMUSCULAR; INTRAVENOUS at 03:01

## 2018-01-09 RX ADMIN — CARBIDOPA AND LEVODOPA 1 SPLIT TABLET: 25; 100 TABLET ORAL at 10:01

## 2018-01-09 RX ADMIN — PHENYLEPHRINE HYDROCHLORIDE 100 MCG: 10 INJECTION INTRAVENOUS at 03:01

## 2018-01-09 RX ADMIN — ACETAMINOPHEN 1000 MG: 10 INJECTION, SOLUTION INTRAVENOUS at 11:01

## 2018-01-09 RX ADMIN — FENTANYL CITRATE 50 MCG: 50 INJECTION, SOLUTION INTRAMUSCULAR; INTRAVENOUS at 02:01

## 2018-01-09 RX ADMIN — MUPIROCIN: 20 OINTMENT TOPICAL at 11:01

## 2018-01-09 RX ADMIN — SODIUM CHLORIDE: 0.9 INJECTION, SOLUTION INTRAVENOUS at 01:01

## 2018-01-09 NOTE — PROGRESS NOTES
Notified DR. Dykes, anesthesia, and Dr. Cooper, sx, pt unable to remove ring. Per DR. Dykes and Dr. Cooper, ring may stay on pt hand.

## 2018-01-09 NOTE — INTERVAL H&P NOTE
The patient has been examined and the H&P has been reviewed:    I concur with the findings and no changes have occurred since H&P was written.    Anesthesia/Surgery risks, benefits and alternative options discussed and understood by patient/family.          Active Hospital Problems    Diagnosis  POA    Polyp of colon [K63.5]  Yes      Resolved Hospital Problems    Diagnosis Date Resolved POA   No resolved problems to display.

## 2018-01-09 NOTE — TRANSFER OF CARE
"Anesthesia Transfer of Care Note    Patient: Jagdeep Greenberg    Procedure(s) Performed: Procedure(s) (LRB):  COLONOSCOPY - OPERATIVE (N/A)  POLYPECTOMY; endoscopic mucosal resection (EMR) (N/A)    Patient location: PACU    Anesthesia Type: general    Transport from OR: Transported from OR on 6-10 L/min O2 by face mask with adequate spontaneous ventilation    Post pain: adequate analgesia    Post assessment: no apparent anesthetic complications and tolerated procedure well    Post vital signs: stable    Level of consciousness: awake and responds to stimulation    Nausea/Vomiting: no nausea/vomiting    Complications: none    Transfer of care protocol was followed      Last vitals:   Visit Vitals  Pulse 80   Temp 36.8 °C (98.2 °F) (Oral)   Resp 16   Ht 6' 3" (1.905 m)   Wt 81.6 kg (180 lb)   SpO2 98%   BMI 22.50 kg/m²     "

## 2018-01-09 NOTE — BRIEF OP NOTE
[facility]  Brief Operative Note    SUMMARY     Surgery Date: 1/9/2018     Surgeon(s) and Role:     * Nghia Cooper MD - Primary    Assisting Surgeon: Katherine Gibbs MD    Pre-op Diagnosis:  Polyp of colon, unspecified part of colon, unspecified type [K63.5]    Post-op Diagnosis:  Post-Op Diagnosis Codes:     * Polyp of colon, unspecified part of colon, unspecified type [K63.5]    Procedure(s) (LRB):  COLONOSCOPY - OPERATIVE (N/A)  POLYPECTOMY (N/A)  COLECTOMY-LAPAROSCOPIC RIGHT (N/A)    Anesthesia: General    Description of Procedure: colonoscopy, EMR of hepatic flexure polyp    Description of the findings of the procedure: flat polypoid lesion at hepatic flexure. EMR with saline lift    Estimated Blood Loss: minimal         Specimens:   Specimen (12h ago through future)    None

## 2018-01-09 NOTE — H&P (VIEW-ONLY)
History & Physical    SUBJECTIVE:     Chief Complaint: Colon Polyp    History of Present Illness:  Patient is a 77 y.o. male who presents for preoperative evaluation. He is scheduled for colonoscopy, possible polypectomy, poss lap right for the treatment of unresectable colon polyp.    77 M underwent colonoscopy 10/24/2017. Several polyps were identified. 1mm in cecum, 1mm in ascending colon, 4mm in distal ascending colon, 2mm in transverse colon and  12mm in hepatic flexure was not resectable      Path: Cecum polyp, biopsy:  Tubular adenoma.  2. Proximal ascending colon polyp, biopsy:  Tubular adenoma.  3. Ascending colon polyp, biopsy:  Sessile serrated polyp.  Negative for dysplasia.  4. Ascending colon, abnormal mucosa, biopsy:  Fragments of tubular adenoma.  5. Transverse colon polyp, biopsy:    Denies any rectal bleeding or abdominal pain. Has prostate removed 15 years ago. Denies any other abdominal surgeries.     Review of patient's allergies indicates:  No Known Allergies    Past Medical History:   Diagnosis Date    Adenomatous polyp of colon     Arthritis     Carotid artery plaque     Less than 50%    Chronic kidney disease, stage 3     Diabetes mellitus, type 2     Erectile dysfunction following radical prostatectomy     Fractures     Hyperlipidemia LDL goal < 100     Hypertension goal BP (blood pressure) < 130/80     Lacunar stroke 8/17/2017    MCI (mild cognitive impairment)     Parkinsonism 4/11/2017    Prostate cancer 2001    Stroke      Past Surgical History:   Procedure Laterality Date    APPENDECTOMY      COLONOSCOPY  In 3/2/2010    Repeat 5 years    COLONOSCOPY N/A 9/29/2015    Procedure: COLONOSCOPY;  Surgeon: Erik Brandt MD;  Location: [facility];  Service: Endoscopy;  Laterality: N/A;    COLONOSCOPY N/A 10/11/2016    Procedure: COLONOSCOPY;  Surgeon: Erik Brandt MD;  Location: [facility];  Service: Endoscopy;  Laterality: N/A;    COLONOSCOPY N/A 10/24/2017     Procedure: COLONOSCOPY;  Surgeon: Erik Brandt MD;  Location: [facility];  Service: Endoscopy;  Laterality: N/A;    FRACTURE SURGERY      HERNIA REPAIR      LYMPH NODE DISSECTION      Pelvic    PROSTATECTOMY      SHOULDER ARTHROSCOPY      Right     Family History   Problem Relation Age of Onset    Leukemia Father 68     Social History   Substance Use Topics    Smoking status: Never Smoker    Smokeless tobacco: Never Used    Alcohol use 21.0 oz/week     14 Cans of beer, 21 Shots of liquor per week        Review of Systems:  Constitutional: no fever or chills, pain well controlled  Respiratory: no cough or shortness of breath  Cardiovascular: no chest pain or palpitations  Gastrointestinal: no nausea or vomiting, tolerating diet  Hematologic/Lymphatic: no easy bruising or lymphadenopathy  Musculoskeletal: no arthralgias or myalgias  Neurological: no seizures or tremors    OBJECTIVE:     Vital Signs (Most Recent)  Pulse: 70 (12/22/17 1203)  BP: 138/64 (12/22/17 1203)    Physical Exam:  General: White male in NAD sitting in chair in clinic  Neuro: aaox4 maex4 perrl  Respiratory: resps even unlabored  Cardiac: cap refill <2 sec  Abdomen: Normal, benign.  Extremities: Warm dry and intact  Anorectal: deferred      ASSESSMENT/PLAN:     Admission paperwork was accomplished including operative consent and consent for blood and blood product administration.    The procedure was explained to the patient including indications, risks and alternatives. The patient verbalized understanding and has given informed consent.    Plan:  Proceed with operative procedure as planned.    Follow Up:  After hospitalization.    Plan for OR 01/9/2018

## 2018-01-10 VITALS
TEMPERATURE: 98 F | BODY MASS INDEX: 22.47 KG/M2 | SYSTOLIC BLOOD PRESSURE: 109 MMHG | OXYGEN SATURATION: 98 % | HEIGHT: 75 IN | RESPIRATION RATE: 18 BRPM | DIASTOLIC BLOOD PRESSURE: 60 MMHG | WEIGHT: 180.75 LBS | HEART RATE: 60 BPM

## 2018-01-10 PROCEDURE — 63600175 PHARM REV CODE 636 W HCPCS: Performed by: SURGERY

## 2018-01-10 PROCEDURE — 25000003 PHARM REV CODE 250: Performed by: SURGERY

## 2018-01-10 RX ADMIN — MUPIROCIN 1 G: 20 OINTMENT TOPICAL at 09:01

## 2018-01-10 RX ADMIN — HYDROCHLOROTHIAZIDE 12.5 MG: 12.5 TABLET ORAL at 09:01

## 2018-01-10 RX ADMIN — PRAVASTATIN SODIUM 20 MG: 20 TABLET ORAL at 09:01

## 2018-01-10 RX ADMIN — AMLODIPINE BESYLATE 10 MG: 10 TABLET ORAL at 06:01

## 2018-01-10 RX ADMIN — CARBIDOPA AND LEVODOPA 1 SPLIT TABLET: 25; 100 TABLET ORAL at 06:01

## 2018-01-10 RX ADMIN — METFORMIN HYDROCHLORIDE 1000 MG: 500 TABLET, FILM COATED ORAL at 09:01

## 2018-01-10 RX ADMIN — ASPIRIN 81 MG: 81 TABLET, COATED ORAL at 09:01

## 2018-01-10 RX ADMIN — BENAZEPRIL HYDROCHLORIDE 20 MG: 20 TABLET, FILM COATED ORAL at 09:01

## 2018-01-10 RX ADMIN — PREDNISONE 5 MG: 5 TABLET ORAL at 09:01

## 2018-01-10 NOTE — PROGRESS NOTES
[facility]  Colorectal Surgery  Progress Note    Patient Name: Jagdeep Greenberg  MRN: [id number]  Admission Date: 1/9/2018  Hospital Length of Stay: 0 days  Attending Physician: Nghia Cooper MD    Subjective:     Interval History: No issues overnight. Pain well controlled. Tolerating clear liquids. No drainage from rectum.     Post-Op Info:  Procedure(s) (LRB):  COLONOSCOPY - OPERATIVE (N/A)  POLYPECTOMY; endoscopic mucosal resection (EMR) (N/A)   1 Day Post-Op      Medications:  Continuous Infusions:  Scheduled Meds:   amLODIPine  10 mg Oral QAM    aspirin  81 mg Oral Daily    benazepril  20 mg Oral Daily    And    hydroCHLOROthiazide  12.5 mg Oral Daily    carbidopa-levodopa 12.5-50 mg  1 split tablet Oral TID    diclofenac sodium  2 g Topical (Top) QID    glimepiride  2 mg Oral Daily with breakfast    metFORMIN  1,000 mg Oral Daily    mupirocin  1 g Nasal BID    pravastatin  20 mg Oral Daily    predniSONE  5 mg Oral Daily     PRN Meds:   calcium carbonate    cefTRIAXone (ROCEPHIN) IVPB    diphenhydrAMINE    metoclopramide HCl    ondansetron    ramelteon        Objective:     Vital Signs (Most Recent):  Temp: 98.2 °F (36.8 °C) (01/10/18 0455)  Pulse: (!) 58 (01/10/18 0455)  Resp: 18 (01/10/18 0455)  BP: (!) 99/56 (01/10/18 0455)  SpO2: 98 % (01/10/18 0455) Vital Signs (24h Range):  Temp:  [97.7 °F (36.5 °C)-98.4 °F (36.9 °C)] 98.2 °F (36.8 °C)  Pulse:  [55-80] 58  Resp:  [11-20] 18  SpO2:  [96 %-100 %] 98 %  BP: ()/(51-66) 99/56     Intake/Output - Last 3 Shifts       01/08 0700 - 01/09 0659 01/09 0700 - 01/10 0659 01/10 0700 - 01/11 0659    I.V. (mL/kg)  1000 (12.3)     Total Intake(mL/kg)  1000 (12.3)     Urine (mL/kg/hr)  50     Total Output   50      Net   +950             Urine Occurrence  3 x           Physical Exam   Constitutional: He is oriented to person, place, and time. He appears well-developed and well-nourished.   HENT:   Head: Normocephalic and  atraumatic.   Eyes: EOM are normal.   Neck: Neck supple.   Cardiovascular: Normal rate and regular rhythm.    Pulmonary/Chest: Effort normal and breath sounds normal.   Abdominal: Soft. He exhibits no distension. There is no tenderness. There is no rebound and no guarding.   Neurological: He is alert and oriented to person, place, and time.   Psychiatric: He has a normal mood and affect. His behavior is normal.       Significant Labs:  BMP (Last 3 Results):   Recent Labs  Lab 01/04/18  1107   *      K 4.2      CO2 26   BUN 19   CREATININE 1.0   CALCIUM 10.0     CBC (Last 3 Results):   Recent Labs  Lab 01/04/18  1107   WBC 7.02   RBC 4.20*   HGB 14.2   HCT 40.2      MCV 96   MCH 33.8*   MCHC 35.3     LFTs: No results for input(s): ALT, AST, ALKPHOS, BILITOT, PROT, ALBUMIN in the last 168 hours.    Significant Diagnostics:  I have reviewed all pertinent imaging results/findings within the past 24 hours.    Assessment/Plan:     Polyp of colon    POD#1 EMR of hepatic flexture mass    - Doing well  - Advance to regular diet  - Home medications  - Dispo: D/C home today with 2 week f/u              Asaf De Jesus MD  Colorectal Surgery  [facility]

## 2018-01-10 NOTE — SUBJECTIVE & OBJECTIVE
Subjective:     Interval History: No issues overnight. Pain well controlled. Tolerating clear liquids. No drainage from rectum.     Post-Op Info:  Procedure(s) (LRB):  COLONOSCOPY - OPERATIVE (N/A)  POLYPECTOMY; endoscopic mucosal resection (EMR) (N/A)   1 Day Post-Op      Medications:  Continuous Infusions:  Scheduled Meds:   amLODIPine  10 mg Oral QAM    aspirin  81 mg Oral Daily    benazepril  20 mg Oral Daily    And    hydroCHLOROthiazide  12.5 mg Oral Daily    carbidopa-levodopa 12.5-50 mg  1 split tablet Oral TID    diclofenac sodium  2 g Topical (Top) QID    glimepiride  2 mg Oral Daily with breakfast    metFORMIN  1,000 mg Oral Daily    mupirocin  1 g Nasal BID    pravastatin  20 mg Oral Daily    predniSONE  5 mg Oral Daily     PRN Meds:   calcium carbonate    cefTRIAXone (ROCEPHIN) IVPB    diphenhydrAMINE    metoclopramide HCl    ondansetron    ramelteon        Objective:     Vital Signs (Most Recent):  Temp: 98.2 °F (36.8 °C) (01/10/18 0455)  Pulse: (!) 58 (01/10/18 0455)  Resp: 18 (01/10/18 0455)  BP: (!) 99/56 (01/10/18 0455)  SpO2: 98 % (01/10/18 0455) Vital Signs (24h Range):  Temp:  [97.7 °F (36.5 °C)-98.4 °F (36.9 °C)] 98.2 °F (36.8 °C)  Pulse:  [55-80] 58  Resp:  [11-20] 18  SpO2:  [96 %-100 %] 98 %  BP: ()/(51-66) 99/56     Intake/Output - Last 3 Shifts       01/08 0700 - 01/09 0659 01/09 0700 - 01/10 0659 01/10 0700 - 01/11 0659    I.V. (mL/kg)  1000 (12.3)     Total Intake(mL/kg)  1000 (12.3)     Urine (mL/kg/hr)  50     Total Output   50      Net   +950             Urine Occurrence  3 x           Physical Exam   Constitutional: He is oriented to person, place, and time. He appears well-developed and well-nourished.   HENT:   Head: Normocephalic and atraumatic.   Eyes: EOM are normal.   Neck: Neck supple.   Cardiovascular: Normal rate and regular rhythm.    Pulmonary/Chest: Effort normal and breath sounds normal.   Abdominal: Soft. He exhibits no distension. There is no  tenderness. There is no rebound and no guarding.   Neurological: He is alert and oriented to person, place, and time.   Psychiatric: He has a normal mood and affect. His behavior is normal.       Significant Labs:  BMP (Last 3 Results):   Recent Labs  Lab 01/04/18  1107   *      K 4.2      CO2 26   BUN 19   CREATININE 1.0   CALCIUM 10.0     CBC (Last 3 Results):   Recent Labs  Lab 01/04/18  1107   WBC 7.02   RBC 4.20*   HGB 14.2   HCT 40.2      MCV 96   MCH 33.8*   MCHC 35.3     LFTs: No results for input(s): ALT, AST, ALKPHOS, BILITOT, PROT, ALBUMIN in the last 168 hours.    Significant Diagnostics:  I have reviewed all pertinent imaging results/findings within the past 24 hours.

## 2018-01-10 NOTE — ASSESSMENT & PLAN NOTE
POD#1 EMR of hepatic flexture mass    - Doing well  - Advance to regular diet  - Home medications  - Dispo: D/C home today with 2 week f/u

## 2018-01-10 NOTE — NURSING
Patient d/c to home today accompanied by wife. D/C teaching performed with patient.Follow up appointments confirmed with patient. IV d/c prior to discharge, no bleeding, redness, swelling noted, site dressing CDI. Medication teaching performed, med list confirmed with Pt. Instructed on d/c plan.  Instructed on s/s which warrant calling MD such as temp greater than 100.4, headache, new onset and/or severe pain, swelling/ redness at incision sites. [facility] in d/c instructions. Family questions answered. Patient verbalized understanding.

## 2018-01-10 NOTE — NURSING TRANSFER
Nursing Transfer Note      1/9/2018     Transfer To: 641    Transfer via stretcher    Transported by PCT    Medicines sent: N/A    Chart send with patient: Yes    Notified: spouse    Patient reassessed at: 1/9/18 @ 9973

## 2018-01-10 NOTE — PROGRESS NOTES
Attempted to contact pt's spouse, Joaquin Greenberg, no answer to phone at this time. Message left. Will continue to monitor.

## 2018-01-11 NOTE — OP NOTE
DATE OF PROCEDURE:  01/09/2018.    PREOPERATIVE DIAGNOSIS:  Hepatic flexure polyp.    POSTOPERATIVE DIAGNOSIS:  Hepatic flexure polyp.    PROCEDURE PERFORMED:  Operative colonoscopy with endoscopic mucosal resection of hepatic flexure polyp.    ESTIMATED BLOOD LOSS:  <10cc.    SURGEON:  Nghia Cooper M.D.    ASSISTANT:  Key Gibbs M.D.     SPECIMEN:  Hepatic flexure polyp polyp.    DESCRIPTION OF PROCEDURE:  After obtaining informed consent, the patient was   taken to the Operating Room and placed in the supine position.  He underwent   general endotracheal anesthesia.  A surgical timeout was   performed.  A therapeutic colonoscope was inserted (with a distal attachment) to the cecum   and then brought back to the hepatic flexure, where there was an approximately 12 mm   polyp. We performed a piecemeal snare of the lesion in the following manner. Hemostasis was obtained with injection of   epinephrine into the submucosa.  I attempted to place a clip; however, I could   not get an adequate mucosal purchase.  I then performed retroflexion in the cecum. No additional polyp tissue was seen on forward and retroflexed views.  He will be kept overnight for observation.      KALINA  dd: 01/11/2018 07:42:35 (CST)  td: 01/11/2018 09:12:45 (CST)  Doc ID   #[id number]  Job ID #[id number]    CC:        [id number]

## 2018-01-11 NOTE — DISCHARGE SUMMARY
[facility]  General Surgery  Discharge Summary      Patient Name: Jagdeep Greenberg  MRN: [id number]  Admission Date: 1/9/2018  Hospital Length of Stay: 0 days  Discharge Date and Time: 1/10/2018 10:48 AM  Attending Physician: No att. providers found   Discharging Provider: Asaf De Jesus MD  Primary Care Provider: Panda Contreras MD     HPI: Patient is a 77 y.o. male who presents for preoperative evaluation. He is scheduled for colonoscopy, possible polypectomy, poss lap right for the treatment of unresectable colon polyp.     77 M underwent colonoscopy 10/24/2017. Several polyps were identified. 1mm in cecum, 1mm in ascending colon, 4mm in distal ascending colon, 2mm in transverse colon and  12mm in hepatic flexure was not resectable     Procedure(s) (LRB):  COLONOSCOPY - OPERATIVE (N/A)  POLYPECTOMY; endoscopic mucosal resection (EMR) (N/A)     Hospital Course:  The patient was admitted and underwent colonoscopy with endoscopic mucosal resection of a hepatic flexure polyp. There were no complications. His post-op course was uncomplicated and was discharged on post-procedure day 1. Discharge home with family support. Clinic follow up in 2 weeks.     Consults:     Significant Diagnostic Studies: Labs: All labs within the past 24 hours have been reviewed    Pending Diagnostic Studies:     None        Final Active Diagnoses:    Diagnosis Date Noted POA    Polyp of colon [K63.5] 01/09/2018 Yes      Problems Resolved During this Admission:    Diagnosis Date Noted Date Resolved POA      Discharged Condition: good    Disposition: Home or Self Care    Follow Up:  Follow-up Information     Nghia Cooper MD In 2 weeks.    Specialty:  Colon and Rectal Surgery  Contact information:  [facility] [id number] [phone]                 Patient Instructions:     Diet Adult Regular     Activity as tolerated     Notify your health care provider if you experience any of the following:  redness,  tenderness, or signs of infection (pain, swelling, redness, odor or green/yellow discharge around incision site)     Notify your health care provider if you experience any of the following:  severe uncontrolled pain     Notify your health care provider if you experience any of the following:  persistent nausea and vomiting or diarrhea     Notify your health care provider if you experience any of the following:  temperature >100.4       Medications:  Reconciled Home Medications:   Discharge Medication List as of 1/10/2018  9:52 AM      CONTINUE these medications which have NOT CHANGED    Details   amlodipine (NORVASC) 10 MG tablet Take 1 tablet (10 mg total) by mouth once daily., Starting Mon 10/9/2017, Normal      aspirin (ECOTRIN) 81 MG EC tablet Take 1 tablet (81 mg total) by mouth once daily., Starting 10/25/2016, Until Discontinued, OTC      benazepril-hydrochlorthiazide (LOTENSIN HCT) 20-12.5 mg per tablet TAKE 1 TABLET BY MOUTH EVERY DAY, Normal      blood sugar diagnostic (GLUCOSE BLOOD) Strp Test glucose 1 x daily, Print      carbidopa-levodopa  mg (SINEMET)  mg per tablet 0.5 tablet QD for 1 day, then 0.5 tablet BID for 1 day, then 0.5 tablet TID and continue, Normal      diclofenac sodium (VOLTAREN) 1 % Gel Apply 2 g topically 4 (four) times daily., Starting Tue 10/31/2017, Normal      fish oil-omega-3 fatty acids 300-1,000 mg capsule Take 2 g by mouth once daily., Historical Med      glimepiride (AMARYL) 2 MG tablet Take 1 tablet (2 mg total) by mouth daily with breakfast., Starting Mon 10/9/2017, Normal      GLUC.METER,DIS.P-LOADED STRIPS (GLUCOSE METER, DISP & STRIPS) Kit Check glucose once per day, Print      GLUCOSAMINE HCL (GLUCOSAMINE, BULK, MISC) by Misc.(Non-Drug; Combo Route) route., Historical Med      L. RHAMNOSUS GG/INULIN (CULTURELLE PROBIOTICS ORAL) Take by mouth., Historical Med      lancets Misc As directed, Print      MEN'S MULTI-VITAMIN ORAL Take by mouth., Historical Med       metformin (GLUCOPHAGE) 1000 MG tablet TAKE 1 TABLET BY MOUTH DAILY WITH BREAKFAST, Normal      pravastatin (PRAVACHOL) 20 MG tablet TAKE 1 TABLET BY MOUTH DAILY, Normal      predniSONE (DELTASONE) 5 MG tablet TAKE 1 TABLET(5 MG) BY MOUTH TWICE DAILY, Normal      turmeric root extract 500 mg Cap Take by mouth., Historical Med         STOP taking these medications       metroNIDAZOLE (FLAGYL) 500 MG tablet Comments:   Reason for Stopping:         neomycin (MYCIFRADIN) 500 mg Tab Comments:   Reason for Stopping:               Asaf De Jesus MD  General Surgery  [facility]

## 2018-01-15 ENCOUNTER — PES CALL (OUTPATIENT)
Dept: ADMINISTRATIVE | Facility: CLINIC | Age: 78
End: 2018-01-15

## 2018-01-17 ENCOUNTER — PES CALL (OUTPATIENT)
Dept: ADMINISTRATIVE | Facility: CLINIC | Age: 78
End: 2018-01-17

## 2018-01-19 NOTE — ANESTHESIA POSTPROCEDURE EVALUATION
"Anesthesia Post Evaluation    Patient: Jagdeep Greenberg    Procedure(s) Performed: Procedure(s) (LRB):  COLONOSCOPY - OPERATIVE (N/A)  POLYPECTOMY; endoscopic mucosal resection (EMR) (N/A)    Final Anesthesia Type: general  Patient location during evaluation: PACU  Patient participation: Yes- Able to Participate  Level of consciousness: awake and alert  Post-procedure vital signs: reviewed and stable  Pain management: adequate  Airway patency: patent  PONV status at discharge: No PONV  Anesthetic complications: no      Cardiovascular status: stable  Respiratory status: unassisted and spontaneous ventilation  Hydration status: euvolemic  Follow-up not needed.        Visit Vitals  /60 (BP Location: Left arm, Patient Position: Lying)   Pulse 60   Temp 36.7 °C (98 °F) (Oral)   Resp 18   Ht 6' 3" (1.905 m)   Wt 82 kg (180 lb 12.4 oz)   SpO2 98%   BMI 22.60 kg/m²       Pain/Chirag Score: No Data Recorded      "

## 2018-02-15 ENCOUNTER — LAB VISIT (OUTPATIENT)
Dept: LAB | Facility: HOSPITAL | Age: 78
End: 2018-02-15
Attending: FAMILY MEDICINE
Payer: MEDICARE

## 2018-02-15 DIAGNOSIS — E11.69 COMBINED HYPERLIPIDEMIA ASSOCIATED WITH TYPE 2 DIABETES MELLITUS: ICD-10-CM

## 2018-02-15 DIAGNOSIS — N18.3 TYPE 2 DIABETES MELLITUS WITH STAGE 3 CHRONIC KIDNEY DISEASE, UNSPECIFIED LONG TERM INSULIN USE STATUS: ICD-10-CM

## 2018-02-15 DIAGNOSIS — M25.50 PAIN, JOINT, MULTIPLE SITES: ICD-10-CM

## 2018-02-15 DIAGNOSIS — E78.2 COMBINED HYPERLIPIDEMIA ASSOCIATED WITH TYPE 2 DIABETES MELLITUS: ICD-10-CM

## 2018-02-15 DIAGNOSIS — E11.22 TYPE 2 DIABETES MELLITUS WITH STAGE 3 CHRONIC KIDNEY DISEASE, UNSPECIFIED LONG TERM INSULIN USE STATUS: ICD-10-CM

## 2018-02-15 DIAGNOSIS — E11.9 TYPE 2 DIABETES MELLITUS WITHOUT COMPLICATION, WITHOUT LONG-TERM CURRENT USE OF INSULIN: ICD-10-CM

## 2018-02-15 DIAGNOSIS — N18.30 CHRONIC KIDNEY DISEASE, STAGE 3: ICD-10-CM

## 2018-02-15 DIAGNOSIS — Z85.46 HISTORY OF PROSTATE CANCER: ICD-10-CM

## 2018-02-15 DIAGNOSIS — M35.3 PMR (POLYMYALGIA RHEUMATICA): ICD-10-CM

## 2018-02-15 DIAGNOSIS — Z00.00 ROUTINE HISTORY AND PHYSICAL EXAMINATION OF ADULT: ICD-10-CM

## 2018-02-15 LAB
ALBUMIN SERPL BCP-MCNC: 3.9 G/DL
ALP SERPL-CCNC: 55 U/L
ALT SERPL W/O P-5'-P-CCNC: 20 U/L
ANION GAP SERPL CALC-SCNC: 10 MMOL/L
AST SERPL-CCNC: 19 U/L
BASOPHILS # BLD AUTO: 0.02 K/UL
BASOPHILS NFR BLD: 0.4 %
BILIRUB SERPL-MCNC: 0.7 MG/DL
BUN SERPL-MCNC: 17 MG/DL
CALCIUM SERPL-MCNC: 9.7 MG/DL
CHLORIDE SERPL-SCNC: 104 MMOL/L
CHOLEST SERPL-MCNC: 163 MG/DL
CHOLEST/HDLC SERPL: 3.5 {RATIO}
CO2 SERPL-SCNC: 27 MMOL/L
COMPLEXED PSA SERPL-MCNC: 0.45 NG/ML
CREAT SERPL-MCNC: 1.2 MG/DL
CRP SERPL-MCNC: 1 MG/L
DIFFERENTIAL METHOD: ABNORMAL
EOSINOPHIL # BLD AUTO: 0.1 K/UL
EOSINOPHIL NFR BLD: 2.7 %
ERYTHROCYTE [DISTWIDTH] IN BLOOD BY AUTOMATED COUNT: 12.9 %
ERYTHROCYTE [SEDIMENTATION RATE] IN BLOOD BY WESTERGREN METHOD: 5 MM/HR
EST. GFR  (AFRICAN AMERICAN): >60 ML/MIN/1.73 M^2
EST. GFR  (NON AFRICAN AMERICAN): 58 ML/MIN/1.73 M^2
ESTIMATED AVG GLUCOSE: 126 MG/DL
GLUCOSE SERPL-MCNC: 260 MG/DL
HBA1C MFR BLD HPLC: 6 %
HCT VFR BLD AUTO: 39.7 %
HDLC SERPL-MCNC: 47 MG/DL
HDLC SERPL: 28.8 %
HGB BLD-MCNC: 13.4 G/DL
LDLC SERPL CALC-MCNC: 91.2 MG/DL
LYMPHOCYTES # BLD AUTO: 1.3 K/UL
LYMPHOCYTES NFR BLD: 25 %
MCH RBC QN AUTO: 33.2 PG
MCHC RBC AUTO-ENTMCNC: 33.8 G/DL
MCV RBC AUTO: 98 FL
MONOCYTES # BLD AUTO: 0.5 K/UL
MONOCYTES NFR BLD: 9.3 %
NEUTROPHILS # BLD AUTO: 3.2 K/UL
NEUTROPHILS NFR BLD: 62.6 %
NONHDLC SERPL-MCNC: 116 MG/DL
PLATELET # BLD AUTO: 223 K/UL
PMV BLD AUTO: 11.3 FL
POTASSIUM SERPL-SCNC: 4 MMOL/L
PROT SERPL-MCNC: 6.9 G/DL
RBC # BLD AUTO: 4.04 M/UL
SODIUM SERPL-SCNC: 141 MMOL/L
TRIGL SERPL-MCNC: 124 MG/DL
WBC # BLD AUTO: 5.15 K/UL

## 2018-02-15 PROCEDURE — 36415 COLL VENOUS BLD VENIPUNCTURE: CPT | Mod: PO

## 2018-02-15 PROCEDURE — 84153 ASSAY OF PSA TOTAL: CPT

## 2018-02-15 PROCEDURE — 83036 HEMOGLOBIN GLYCOSYLATED A1C: CPT

## 2018-02-15 PROCEDURE — 85651 RBC SED RATE NONAUTOMATED: CPT | Mod: PO

## 2018-02-15 PROCEDURE — 85025 COMPLETE CBC W/AUTO DIFF WBC: CPT | Mod: PO

## 2018-02-15 PROCEDURE — 80061 LIPID PANEL: CPT

## 2018-02-15 PROCEDURE — 86140 C-REACTIVE PROTEIN: CPT

## 2018-02-15 PROCEDURE — 80053 COMPREHEN METABOLIC PANEL: CPT

## 2018-02-20 ENCOUNTER — OFFICE VISIT (OUTPATIENT)
Dept: RHEUMATOLOGY | Facility: CLINIC | Age: 78
End: 2018-02-20
Payer: MEDICARE

## 2018-02-20 VITALS
HEIGHT: 75 IN | DIASTOLIC BLOOD PRESSURE: 62 MMHG | WEIGHT: 183 LBS | SYSTOLIC BLOOD PRESSURE: 118 MMHG | HEART RATE: 73 BPM | BODY MASS INDEX: 22.75 KG/M2

## 2018-02-20 DIAGNOSIS — M35.3 PMR (POLYMYALGIA RHEUMATICA): Primary | ICD-10-CM

## 2018-02-20 DIAGNOSIS — M15.9 PRIMARY OSTEOARTHRITIS INVOLVING MULTIPLE JOINTS: ICD-10-CM

## 2018-02-20 DIAGNOSIS — N18.30 CHRONIC KIDNEY DISEASE, STAGE 3: ICD-10-CM

## 2018-02-20 DIAGNOSIS — M72.0 DUPUYTREN'S CONTRACTURE OF BOTH HANDS: ICD-10-CM

## 2018-02-20 DIAGNOSIS — Z79.52 LONG TERM CURRENT USE OF SYSTEMIC STEROIDS: ICD-10-CM

## 2018-02-20 PROBLEM — Z79.51 LONG TERM CURRENT USE OF INHALED STEROID: Status: ACTIVE | Noted: 2018-02-20

## 2018-02-20 PROCEDURE — 3008F BODY MASS INDEX DOCD: CPT | Mod: S$GLB,,, | Performed by: PHYSICIAN ASSISTANT

## 2018-02-20 PROCEDURE — 1126F AMNT PAIN NOTED NONE PRSNT: CPT | Mod: S$GLB,,, | Performed by: PHYSICIAN ASSISTANT

## 2018-02-20 PROCEDURE — 1159F MED LIST DOCD IN RCRD: CPT | Mod: S$GLB,,, | Performed by: PHYSICIAN ASSISTANT

## 2018-02-20 PROCEDURE — 99214 OFFICE O/P EST MOD 30 MIN: CPT | Mod: S$GLB,,, | Performed by: PHYSICIAN ASSISTANT

## 2018-02-20 PROCEDURE — 99999 PR PBB SHADOW E&M-EST. PATIENT-LVL IV: CPT | Mod: PBBFAC,,, | Performed by: PHYSICIAN ASSISTANT

## 2018-02-20 PROCEDURE — 99499 UNLISTED E&M SERVICE: CPT | Mod: S$GLB,,, | Performed by: PHYSICIAN ASSISTANT

## 2018-02-20 NOTE — PROGRESS NOTES
Subjective:       Patient ID: Jagdeep Greenberg is a 77 y.o. male.    Chief Complaint: pmr; Osteoarthritis; and Chronic Kidney Disease    Jagdeep is here for follow up. Treated for PMR in the past. Weaned off prednisone  Over 2 years now. No need to add dmard therapy. At last visit he had multiple issues. Increased joint pain and muscle aches. Stiffness awais hip and shoulder area. Esr was mildly elevated at 12. He felt like when dx with prm. Also trigger finger on both hands. We injected the tendon sheath and the trigger finger resolved. We added back prednisone 5 mg bid and he felt better within 2 days. Added turmeric  1000 mg bid. He has done better. Esr and crp normal. We have been weaning back down on his prednisone the past several month.  He is now down to prednisone  2.5 mg Q am. . He is taking glucosamine and fish oil as well.  Feeling good. No joint pain or stiffness. Today  pain level rated at 010.  Unable to take nonsteroidals because of chronic kidney disease.  In the past uses Tylenol when necessary for pain.        Osteoarthritis   Pertinent negatives include no abdominal pain, arthralgias, chest pain, chills, congestion, coughing, fatigue, fever, joint swelling, myalgias, nausea, neck pain, numbness, rash, vomiting or weakness.       Review of Systems   Constitutional: Negative.  Negative for chills, fatigue and fever.   HENT: Negative.  Negative for congestion, mouth sores and trouble swallowing.    Eyes: Negative.  Negative for photophobia, redness and visual disturbance.   Respiratory: Negative.  Negative for cough, shortness of breath and wheezing.    Cardiovascular: Negative.  Negative for chest pain and leg swelling.   Gastrointestinal: Negative.  Negative for abdominal distention, abdominal pain, diarrhea, nausea and vomiting.   Genitourinary: Negative.  Negative for dysuria, flank pain, frequency and urgency.   Musculoskeletal: Negative for arthralgias, back pain, gait problem, joint swelling,  "myalgias, neck pain and neck stiffness.   Skin: Negative.  Negative for rash.   Neurological: Negative.  Negative for dizziness, weakness and numbness.         Objective:   /62   Pulse 73   Ht 6' 3" (1.905 m)   Wt 83 kg (182 lb 15.7 oz)   BMI 22.87 kg/m²      Physical Exam   Constitutional: He is oriented to person, place, and time and well-developed, well-nourished, and in no distress. No distress.   HENT:   Head: Normocephalic and atraumatic.   Right Ear: External ear normal.   Left Ear: External ear normal.   Mouth/Throat: No oropharyngeal exudate.   Eyes: Conjunctivae and EOM are normal. Pupils are equal, round, and reactive to light. No scleral icterus.   Neck: Neck supple. No thyromegaly present.   Cardiovascular: Normal rate, regular rhythm and normal heart sounds.    No murmur heard.  Pulmonary/Chest: Effort normal and breath sounds normal. No respiratory distress.   Abdominal: Soft. Bowel sounds are normal.       Right Side Rheumatological Exam     Examination finds the shoulder, elbow, wrist, knee, 1st PIP, 1st MCP, 2nd PIP, 2nd MCP, 3rd PIP, 3rd MCP, 4th PIP, 4th MCP, 5th PIP and 5th MCP normal.    Left Side Rheumatological Exam     Examination finds the shoulder, elbow, wrist, knee, 1st PIP, 1st MCP, 2nd PIP, 2nd MCP, 3rd PIP, 3rd MCP, 4th PIP, 4th MCP, 5th PIP and 5th MCP normal.      Lymphadenopathy:     He has no cervical adenopathy.   Neurological: He is alert and oriented to person, place, and time. No cranial nerve deficit. He exhibits normal muscle tone. Gait normal. Coordination normal.   Skin: Skin is warm and dry.     Musculoskeletal: Normal range of motion. He exhibits tenderness. He exhibits no edema.   No triggering today  He does have early Dupuytren's contracture bilateral third fingers  No synovitis on exam              Recent Results (from the past 168 hour(s))   Hemoglobin A1c    Collection Time: 02/15/18  8:35 AM   Result Value Ref Range    Hemoglobin A1C 6.0 (H) 4.0 - 5.6 % "    Estimated Avg Glucose 126 68 - 131 mg/dL   CBC auto differential    Collection Time: 02/15/18  8:35 AM   Result Value Ref Range    WBC 5.15 3.90 - 12.70 K/uL    RBC 4.04 (L) 4.60 - 6.20 M/uL    Hemoglobin 13.4 (L) 14.0 - 18.0 g/dL    Hematocrit 39.7 (L) 40.0 - 54.0 %    MCV 98 82 - 98 fL    MCH 33.2 (H) 27.0 - 31.0 pg    MCHC 33.8 32.0 - 36.0 g/dL    RDW 12.9 11.5 - 14.5 %    Platelets 223 150 - 350 K/uL    MPV 11.3 9.2 - 12.9 fL    Gran # (ANC) 3.2 1.8 - 7.7 K/uL    Lymph # 1.3 1.0 - 4.8 K/uL    Mono # 0.5 0.3 - 1.0 K/uL    Eos # 0.1 0.0 - 0.5 K/uL    Baso # 0.02 0.00 - 0.20 K/uL    Gran% 62.6 38.0 - 73.0 %    Lymph% 25.0 18.0 - 48.0 %    Mono% 9.3 4.0 - 15.0 %    Eosinophil% 2.7 0.0 - 8.0 %    Basophil% 0.4 0.0 - 1.9 %    Differential Method Automated    Comprehensive metabolic panel    Collection Time: 02/15/18  8:35 AM   Result Value Ref Range    Sodium 141 136 - 145 mmol/L    Potassium 4.0 3.5 - 5.1 mmol/L    Chloride 104 95 - 110 mmol/L    CO2 27 23 - 29 mmol/L    Glucose 260 (H) 70 - 110 mg/dL    BUN, Bld 17 8 - 23 mg/dL    Creatinine 1.2 0.5 - 1.4 mg/dL    Calcium 9.7 8.7 - 10.5 mg/dL    Total Protein 6.9 6.0 - 8.4 g/dL    Albumin 3.9 3.5 - 5.2 g/dL    Total Bilirubin 0.7 0.1 - 1.0 mg/dL    Alkaline Phosphatase 55 55 - 135 U/L    AST 19 10 - 40 U/L    ALT 20 10 - 44 U/L    Anion Gap 10 8 - 16 mmol/L    eGFR if African American >60.0 >60 mL/min/1.73 m^2    eGFR if non  58.0 (A) >60 mL/min/1.73 m^2   C-reactive protein    Collection Time: 02/15/18  8:35 AM   Result Value Ref Range    CRP 1.0 0.0 - 8.2 mg/L   Sedimentation rate, manual    Collection Time: 02/15/18  8:35 AM   Result Value Ref Range    Sed Rate 5 0 - 10 mm/Hr   Lipid panel    Collection Time: 02/15/18  8:35 AM   Result Value Ref Range    Cholesterol 163 120 - 199 mg/dL    Triglycerides 124 30 - 150 mg/dL    HDL 47 40 - 75 mg/dL    LDL Cholesterol 91.2 63.0 - 159.0 mg/dL    HDL/Chol Ratio 28.8 20.0 - 50.0 %    Total  Cholesterol/HDL Ratio 3.5 2.0 - 5.0    Non-HDL Cholesterol 116 mg/dL   Prostate Specific Antigen, Diagnostic    Collection Time: 02/15/18  8:35 AM   Result Value Ref Range    PSA DIAGNOSTIC 0.45 0.00 - 4.00 ng/mL   Microalbumin/creatinine urine ratio    Collection Time: 02/15/18  8:46 AM   Result Value Ref Range    Microalbum.,U,Random 5.0 ug/mL    Creatinine, Random Ur 84.0 23.0 - 375.0 mg/dL    Microalb Creat Ratio 6.0 0.0 - 30.0 ug/mg              Assessment:       1. PMR (polymyalgia rheumatica)    2. Dupuytren's contracture of both hands    3. Chronic kidney disease, stage 3    4. Primary osteoarthritis involving multiple joints          1. PMR-  mild recurrence of polymyalgia rheumatica after being off prednisone for 2 years- returns with will shoulder pain, gluteal pain, mild stiffness X 20 min, no synovitis- negative CCP-slight increase in sedimentation rate- dong better  back on prednisone initially  10 mg daily now weaned down to 2.5 mg daily.   Esr 5  no signs of activity     2. Trigger finger will middle fingers-resolved post tendon sheath injection    3. CKD and DM  not able to take nsaids- renal function looks good eGFR 58    4. OA generalized    5. Dupuytren's contracture- early and mild will middle flexor tendon    6. Long term steroid use - normal dexa 2014    Plan:         Drop prednisone to 2.5 mg every other day and if still doing well in 4 weeks trial off    Repeat dexa at next visit has been 4 year    Topical voltaren gel qid prn     Continue to Try tummeric 1500 mg  Continue glucosamine and fish oil  Tylenol for pain , avoid nonsteroidals    At home exercise for dupuytren contracture, paraffin and range of motion exercises      rtc 12 weeks  with CBC, CMP, sedimentation rate and CRP, vi tD and dexa scan

## 2018-03-23 ENCOUNTER — PES CALL (OUTPATIENT)
Dept: ADMINISTRATIVE | Facility: CLINIC | Age: 78
End: 2018-03-23

## 2018-03-23 RX ORDER — PRAVASTATIN SODIUM 20 MG/1
TABLET ORAL
Qty: 90 TABLET | Refills: 3 | Status: SHIPPED | OUTPATIENT
Start: 2018-03-23 | End: 2019-02-20 | Stop reason: SDUPTHER

## 2018-04-11 ENCOUNTER — OFFICE VISIT (OUTPATIENT)
Dept: NEUROLOGY | Facility: CLINIC | Age: 78
End: 2018-04-11
Payer: MEDICARE

## 2018-04-11 VITALS — HEIGHT: 75 IN | WEIGHT: 181.19 LBS | BODY MASS INDEX: 22.53 KG/M2

## 2018-04-11 DIAGNOSIS — G20.C PARKINSONISM, UNSPECIFIED PARKINSONISM TYPE: ICD-10-CM

## 2018-04-11 DIAGNOSIS — G31.84 MCI (MILD COGNITIVE IMPAIRMENT): ICD-10-CM

## 2018-04-11 DIAGNOSIS — F10.10 EXCESSIVE DRINKING ALCOHOL: ICD-10-CM

## 2018-04-11 PROCEDURE — 99499 UNLISTED E&M SERVICE: CPT | Mod: S$GLB,,, | Performed by: PSYCHIATRY & NEUROLOGY

## 2018-04-11 PROCEDURE — 99999 PR PBB SHADOW E&M-EST. PATIENT-LVL III: CPT | Mod: PBBFAC,,, | Performed by: PSYCHIATRY & NEUROLOGY

## 2018-04-11 PROCEDURE — 99213 OFFICE O/P EST LOW 20 MIN: CPT | Mod: S$GLB,,, | Performed by: PSYCHIATRY & NEUROLOGY

## 2018-04-11 RX ORDER — CARBIDOPA AND LEVODOPA 25; 100 MG/1; MG/1
0.5 TABLET ORAL 3 TIMES DAILY
Qty: 135 TABLET | Refills: 3 | Status: SHIPPED | OUTPATIENT
Start: 2018-04-11 | End: 2018-10-31 | Stop reason: SDUPTHER

## 2018-04-11 NOTE — PATIENT INSTRUCTIONS
Fall Prevention  Falls often occur due to slipping, tripping or losing your balance. Millions of people fall every year and injure themselves. Here are ways to reduce your risk of falling again.  · Think about your fall, was there anything that caused your fall that can be fixed, removed, or replaced?  · Make your home safe by keeping walkways clear of objects you may trip over.  · Use non-slip pads under rugs. Do not use area rugs or small throw rugs.  · Use non-slip mats in bathtubs and showers.  · Install handrails and lights on staircases.  · Do not walk in poorly lit areas.  · Do not stand on chairs or wobbly ladders.  · Use caution when reaching overhead or looking upward. This position can cause a loss of balance.  · Be sure your shoes fit properly, have non-slip bottoms and are in good condition.   · Wear shoes both inside and out. Avoid going barefoot or wearing slippers.  · Be cautious when going up and down stairs, curbs, and when walking on uneven sidewalks.  · If your balance is poor, consider using a cane or walker.  · If your fall was related to alcohol use, stop or limit alcohol intake.   · If your fall was related to use of sleeping medicines, talk to your doctor about this. You may need to reduce your dosage at bedtime if you awaken during the night to go to the bathroom.    · To reduce the need for nighttime bathroom trips:  ¨ Avoid drinking fluids for several hours before going to bed  ¨ Empty your bladder before going to bed  ¨ Men can keep a urinal at the bedside  · Stay as active as you can. Balance, flexibility, strength, and endurance all come from exercise. They all play a role in preventing falls. Ask your healthcare provider which types of activity are right for you.  · Get your vision checked on a regular basis.  · If you have pets, know where they are before you stand up or walk so you don't trip over them.  · Use night lights.  Date Last Reviewed: 11/5/2015  © 1045-8114 [employer]. [address] [place] [address], [place] [address]. All rights reserved. This information is not intended as a substitute for professional medical care. Always follow your healthcare professional's instructions.

## 2018-04-12 NOTE — PROGRESS NOTES
[facility] - NEUROLOGY EPILEPSY  [facility] [place]    Date: April 11, 2018   Patient Name: Jagdeep Greenberg   MRN: [id number]   PCP: Panda Contreras  Referring Provider: No ref. provider found    Assessmentn and Plan:   Jagdeep Greenberg is a 77 y.o. male presenting for follow-up MCI and Parkinsonism. Patient is doing well. Will make no changes to medication regimen today.     Problem List Items Addressed This Visit        Neuro    Parkinsonism    Current Assessment & Plan     -- continue Sinemet 1/2 tab TID         MCI (mild cognitive impairment)    Current Assessment & Plan     -- neuropsych testing results again reviewed with patient            Psychiatric    Excessive drinking alcohol    Current Assessment & Plan     -- counseled on appropriate consumption of alcohol             Ernie Harden MD  [facility]   Department of Neurology    Patient note was created using Dragon Dictation.  Any errors in syntax or even information may not have been identified and edited on initial review prior to signing this note.  Subjective:   HPI:   Mr. Jagdeep Greenberg is a 77 y.o. male who presents in follow-up for memory changes and mild parkinsonism.  Parkinson's disease.  The patient reports that he is doing well since beginning Sinemet.  He reports excellent control of his tremor and gait on a half tab 3 times daily.  He denies any side effects.  He denies any sleep disturbances, falls, rigidity, hallucinations, GI side effects, or other new focal neurologic symptoms.  He his wife, who accompanies him and contributes to the history, states that he has cut back significantly on his drinking and is now consuming approximately 2 drinks per night.  They have no other complaints today.    PAST MEDICAL HISTORY:  Past Medical History:   Diagnosis Date    Adenomatous polyp of colon     Arthritis     Carotid artery plaque     Less than 50%    Chronic kidney disease, stage 3      Diabetes mellitus, type 2     Erectile dysfunction following radical prostatectomy     Fractures     Hyperlipidemia LDL goal < 100     Hypertension goal BP (blood pressure) < 130/80     Lacunar stroke 8/17/2017    MCI (mild cognitive impairment)     Parkinsonism 4/11/2017    Prostate cancer 2001    Stroke      PAST SURGICAL HISTORY:  Past Surgical History:   Procedure Laterality Date    APPENDECTOMY      COLONOSCOPY  In 3/2/2010    Repeat 5 years    COLONOSCOPY N/A 9/29/2015    Procedure: COLONOSCOPY;  Surgeon: Erik Brandt MD;  Location: [facility] ENDO;  Service: Endoscopy;  Laterality: N/A;    COLONOSCOPY N/A 10/11/2016    Procedure: COLONOSCOPY;  Surgeon: Erik Brandt MD;  Location: [facility] ENDO;  Service: Endoscopy;  Laterality: N/A;    COLONOSCOPY N/A 10/24/2017    Procedure: COLONOSCOPY;  Surgeon: Erik Brandt MD;  Location[facility];  Service: Endoscopy;  Laterality: N/A;    FRACTURE SURGERY      HERNIA REPAIR      LYMPH NODE DISSECTION      Pelvic    PROSTATECTOMY      SHOULDER ARTHROSCOPY      Right     CURRENT MEDS:  Current Outpatient Prescriptions   Medication Sig Dispense Refill    amlodipine (NORVASC) 10 MG tablet Take 1 tablet (10 mg total) by mouth once daily. (Patient taking differently: Take 10 mg by mouth every morning. ) 90 tablet 3    aspirin (ECOTRIN) 81 MG EC tablet Take 1 tablet (81 mg total) by mouth once daily.  0    benazepril-hydrochlorthiazide (LOTENSIN HCT) 20-12.5 mg per tablet TAKE 1 TABLET BY MOUTH EVERY DAY 90 tablet 3    blood sugar diagnostic (GLUCOSE BLOOD) Strp Test glucose 1 x daily 35 strip prn    carbidopa-levodopa  mg (SINEMET)  mg per tablet Take 0.5 tablets by mouth 3 (three) times daily. 135 tablet 3    fish oil-omega-3 fatty acids 300-1,000 mg capsule Take 2 g by mouth once daily.      glimepiride (AMARYL) 2 MG tablet Take 1 tablet (2 mg total) by mouth daily with breakfast. 90 tablet 3    GLUC.METER,DIS.P-LOADED STRIPS  "(GLUCOSE METER, DISP & STRIPS) Kit Check glucose once per day 1 kit prn    GLUCOSAMINE HCL (GLUCOSAMINE, BULK, MISC) by Misc.(Non-Drug; Combo Route) route.      L. RHAMNOSUS GG/INULIN (CULTURELLE PROBIOTICS ORAL) Take by mouth.      lancets Misc As directed 100 each prn    MEN'S MULTI-VITAMIN ORAL Take by mouth.      metformin (GLUCOPHAGE) 1000 MG tablet TAKE 1 TABLET BY MOUTH DAILY WITH BREAKFAST 90 tablet 3    predniSONE (DELTASONE) 5 MG tablet TAKE 1 TABLET(5 MG) BY MOUTH TWICE DAILY (Patient taking differently: TAKE ONCE DAILY) 60 tablet 2    turmeric root extract 500 mg Cap Take by mouth.      pravastatin (PRAVACHOL) 20 MG tablet TAKE 1 TABLET BY MOUTH DAILY 90 tablet 3     No current facility-administered medications for this visit.      ALLERGIES:  Review of patient's allergies indicates:  No Known Allergies    FAMILY HISTORY:  Family History   Problem Relation Age of Onset    Leukemia Father 68    Anesthesia problems Neg Hx      SOCIAL HISTORY:  Social History   Substance Use Topics    Smoking status: Never Smoker    Smokeless tobacco: Never Used    Alcohol use 21.0 oz/week     14 Cans of beer, 21 Shots of liquor per week     Review of Systems:  12 review of systems is negative except for the symptoms mentioned in HPI.      Objective:     Vitals:    04/11/18 1452   Weight: 82.2 kg (181 lb 3.5 oz)   Height: 6' 3" (1.905 m)     General: NAD, well nourished   Eyes: no tearing, discharge, no erythema   ENT: moist mucous membranes of the oral cavity, nares patent    Neck: Supple, full range of motion  Cardiovascular: Warm and well perfused, pulses equal and symmetrical  Lungs: Normal work of breathing, normal chest wall excursions  Skin: No rash, lesions, or breakdown on exposed skin  Psychiatry: Mood and affect are appropriate   Abdomen: soft, non tender, non distended  Extremeties: No cyanosis, clubbing or edema.    Neurological   MENTAL STATUS: Alert and oriented to person, place, and time. " Attention and concentration within normal limits. Speech without dysarthria, able to name and repeat with moderate Recent and remote memory fair to poor   CRANIAL NERVES: Visual fields intact. PERRL. EOMI with slow saccades. Facial sensation intact. Face symmetrical with hypomimia. Hearing grossly intact. Full shoulder shrug bilaterally. Tongue protrudes midline   SENSORY: Sensation is intact to light touch throughout.    MOTOR: Normal bulk and mildly increased tone in RUE>LUE. No resting tremor noted.5/5 deltoid, biceps, triceps, interosseous, hand  bilaterally. 5/5 iliopsoas, knee extension/flexion, foot dorsi/plantarflexion bilaterally.    REFLEXES: Symmetric and 2+ throughout.   CEREBELLAR/COORDINATION/GAIT: Gait with normal stride and slightly reduced arm swing R>L. Negative pull back test.  Heel to shin intact. Finger to nose intact. Normal rapid alternating movements.     MOCA Results 17:   Visuospatial/Executive: 3/5  Namin/3  Attention: 4  Language: 13  Abstraction: 1/2  Delayed Recall: 0/5  Orientation:   Total:

## 2018-04-19 RX ORDER — BENAZEPRIL HYDROCHLORIDE AND HYDROCHLOROTHIAZIDE 20; 12.5 MG/1; MG/1
TABLET ORAL
Qty: 90 TABLET | Refills: 3 | Status: SHIPPED | OUTPATIENT
Start: 2018-04-19 | End: 2019-02-20 | Stop reason: SDUPTHER

## 2018-05-02 RX ORDER — METFORMIN HYDROCHLORIDE 1000 MG/1
TABLET ORAL
Qty: 90 TABLET | Refills: 3 | Status: SHIPPED | OUTPATIENT
Start: 2018-05-02 | End: 2019-02-20 | Stop reason: SDUPTHER

## 2018-05-30 ENCOUNTER — LAB VISIT (OUTPATIENT)
Dept: LAB | Facility: HOSPITAL | Age: 78
End: 2018-05-30
Attending: PHYSICIAN ASSISTANT
Payer: MEDICARE

## 2018-05-30 DIAGNOSIS — Z79.52 LONG TERM CURRENT USE OF SYSTEMIC STEROIDS: ICD-10-CM

## 2018-05-30 DIAGNOSIS — N18.30 CHRONIC KIDNEY DISEASE, STAGE 3: ICD-10-CM

## 2018-05-30 DIAGNOSIS — M35.3 PMR (POLYMYALGIA RHEUMATICA): ICD-10-CM

## 2018-05-30 LAB
25(OH)D3+25(OH)D2 SERPL-MCNC: 34 NG/ML
ALBUMIN SERPL BCP-MCNC: 4.4 G/DL
ALP SERPL-CCNC: 52 U/L
ALT SERPL W/O P-5'-P-CCNC: 20 U/L
ANION GAP SERPL CALC-SCNC: 9 MMOL/L
AST SERPL-CCNC: 21 U/L
BASOPHILS # BLD AUTO: 0.02 K/UL
BASOPHILS NFR BLD: 0.3 %
BILIRUB SERPL-MCNC: 1 MG/DL
BUN SERPL-MCNC: 21 MG/DL
CALCIUM SERPL-MCNC: 10.1 MG/DL
CHLORIDE SERPL-SCNC: 105 MMOL/L
CO2 SERPL-SCNC: 25 MMOL/L
CREAT SERPL-MCNC: 1.3 MG/DL
CRP SERPL-MCNC: 0.8 MG/L
DIFFERENTIAL METHOD: ABNORMAL
EOSINOPHIL # BLD AUTO: 0.2 K/UL
EOSINOPHIL NFR BLD: 3.6 %
ERYTHROCYTE [DISTWIDTH] IN BLOOD BY AUTOMATED COUNT: 12.8 %
ERYTHROCYTE [SEDIMENTATION RATE] IN BLOOD BY WESTERGREN METHOD: 5 MM/HR
EST. GFR  (AFRICAN AMERICAN): >60 ML/MIN/1.73 M^2
EST. GFR  (NON AFRICAN AMERICAN): 52.6 ML/MIN/1.73 M^2
GLUCOSE SERPL-MCNC: 262 MG/DL
HCT VFR BLD AUTO: 40.9 %
HGB BLD-MCNC: 14 G/DL
LYMPHOCYTES # BLD AUTO: 1.6 K/UL
LYMPHOCYTES NFR BLD: 26.9 %
MCH RBC QN AUTO: 32.3 PG
MCHC RBC AUTO-ENTMCNC: 34.2 G/DL
MCV RBC AUTO: 94 FL
MONOCYTES # BLD AUTO: 0.6 K/UL
MONOCYTES NFR BLD: 10.2 %
NEUTROPHILS # BLD AUTO: 3.4 K/UL
NEUTROPHILS NFR BLD: 59 %
PLATELET # BLD AUTO: 236 K/UL
PMV BLD AUTO: 11 FL
POTASSIUM SERPL-SCNC: 4.3 MMOL/L
PROT SERPL-MCNC: 7.3 G/DL
RBC # BLD AUTO: 4.34 M/UL
SODIUM SERPL-SCNC: 139 MMOL/L
WBC # BLD AUTO: 5.77 K/UL

## 2018-05-30 PROCEDURE — 82306 VITAMIN D 25 HYDROXY: CPT

## 2018-05-30 PROCEDURE — 80053 COMPREHEN METABOLIC PANEL: CPT

## 2018-05-30 PROCEDURE — 36415 COLL VENOUS BLD VENIPUNCTURE: CPT | Mod: PO

## 2018-05-30 PROCEDURE — 85651 RBC SED RATE NONAUTOMATED: CPT | Mod: PO

## 2018-05-30 PROCEDURE — 85025 COMPLETE CBC W/AUTO DIFF WBC: CPT | Mod: PO

## 2018-05-30 PROCEDURE — 86140 C-REACTIVE PROTEIN: CPT

## 2018-06-06 ENCOUNTER — OFFICE VISIT (OUTPATIENT)
Dept: RHEUMATOLOGY | Facility: CLINIC | Age: 78
End: 2018-06-06
Payer: MEDICARE

## 2018-06-06 VITALS
HEART RATE: 69 BPM | HEIGHT: 75 IN | DIASTOLIC BLOOD PRESSURE: 67 MMHG | BODY MASS INDEX: 22.56 KG/M2 | SYSTOLIC BLOOD PRESSURE: 131 MMHG | WEIGHT: 181.44 LBS

## 2018-06-06 DIAGNOSIS — N18.30 CHRONIC KIDNEY DISEASE, STAGE 3: ICD-10-CM

## 2018-06-06 DIAGNOSIS — M35.3 POLYMYALGIA RHEUMATICA: Primary | ICD-10-CM

## 2018-06-06 DIAGNOSIS — M15.9 PRIMARY OSTEOARTHRITIS INVOLVING MULTIPLE JOINTS: Primary | ICD-10-CM

## 2018-06-06 DIAGNOSIS — M35.3 PMR (POLYMYALGIA RHEUMATICA): ICD-10-CM

## 2018-06-06 PROCEDURE — 99999 PR PBB SHADOW E&M-EST. PATIENT-LVL III: CPT | Mod: PBBFAC,,, | Performed by: PHYSICIAN ASSISTANT

## 2018-06-06 PROCEDURE — 99214 OFFICE O/P EST MOD 30 MIN: CPT | Mod: S$GLB,,, | Performed by: PHYSICIAN ASSISTANT

## 2018-06-06 PROCEDURE — 3078F DIAST BP <80 MM HG: CPT | Mod: CPTII,S$GLB,, | Performed by: PHYSICIAN ASSISTANT

## 2018-06-06 PROCEDURE — 99499 UNLISTED E&M SERVICE: CPT | Mod: S$PBB,,, | Performed by: PHYSICIAN ASSISTANT

## 2018-06-06 PROCEDURE — 3075F SYST BP GE 130 - 139MM HG: CPT | Mod: CPTII,S$GLB,, | Performed by: PHYSICIAN ASSISTANT

## 2018-06-06 NOTE — PROGRESS NOTES
"Subjective:       Patient ID: Jagdeep Greenberg is a 77 y.o. male.    Chief Complaint: PMR    Jagdeep is here for follow up. Treated for PMR in the past. Weaned off prednisone  Over 2 years now. No need to add dmard therapy. Last year mild exacerbation  We added back prednisone 5 mg bid and he felt better within 2 days. Added turmeric  1000 mg bid. He has done better. Esr and crp normal. We have since weaned down and off prednisone. Off X 6 months. No recurrence of PMR symptoms.  He is taking glucosamine and fish oil as well.  Feeling good. No joint pain or stiffness. Today  pain level rated at 010.  Unable to take nonsteroidals because of chronic kidney disease.  In the past uses Tylenol when necessary for pain.      dexa normal 2014- repeated today 6/6/18- normal       Osteoarthritis   Pertinent negatives include no abdominal pain, arthralgias, chest pain, chills, congestion, coughing, fatigue, fever, joint swelling, myalgias, nausea, neck pain, numbness, rash, vomiting or weakness.       Review of Systems   Constitutional: Negative.  Negative for chills, fatigue and fever.   HENT: Negative.  Negative for congestion, mouth sores and trouble swallowing.    Eyes: Negative.  Negative for photophobia, redness and visual disturbance.   Respiratory: Negative.  Negative for cough, shortness of breath and wheezing.    Cardiovascular: Negative.  Negative for chest pain and leg swelling.   Gastrointestinal: Negative.  Negative for abdominal distention, abdominal pain, diarrhea, nausea and vomiting.   Genitourinary: Negative.  Negative for dysuria, flank pain, frequency and urgency.   Musculoskeletal: Negative for arthralgias, back pain, gait problem, joint swelling, myalgias, neck pain and neck stiffness.   Skin: Negative.  Negative for rash.   Neurological: Negative.  Negative for dizziness, weakness and numbness.         Objective:   /67   Pulse 69   Ht 6' 3" (1.905 m)   Wt 82.3 kg (181 lb 7 oz)   BMI 22.68 kg/m² "      Physical Exam   Constitutional: He is oriented to person, place, and time and well-developed, well-nourished, and in no distress. No distress.   HENT:   Head: Normocephalic and atraumatic.   Right Ear: External ear normal.   Left Ear: External ear normal.   Mouth/Throat: No oropharyngeal exudate.   Eyes: Conjunctivae and EOM are normal. Pupils are equal, round, and reactive to light. No scleral icterus.   Neck: Neck supple. No thyromegaly present.   Cardiovascular: Normal rate, regular rhythm and normal heart sounds.    No murmur heard.  Pulmonary/Chest: Effort normal and breath sounds normal. No respiratory distress.   Abdominal: Soft. Bowel sounds are normal.       Right Side Rheumatological Exam     Examination finds the shoulder, elbow, wrist, knee, 1st PIP, 1st MCP, 2nd PIP, 2nd MCP, 3rd PIP, 3rd MCP, 4th PIP, 4th MCP, 5th PIP and 5th MCP normal.    Left Side Rheumatological Exam     Examination finds the shoulder, elbow, wrist, knee, 1st PIP, 1st MCP, 2nd PIP, 2nd MCP, 3rd PIP, 3rd MCP, 4th PIP, 4th MCP, 5th PIP and 5th MCP normal.      Lymphadenopathy:     He has no cervical adenopathy.   Neurological: He is alert and oriented to person, place, and time. No cranial nerve deficit. He exhibits normal muscle tone. Gait normal. Coordination normal.   Skin: Skin is warm and dry.     Musculoskeletal: Normal range of motion. He exhibits no edema or tenderness.   No triggering today  He does have early Dupuytren's contracture bilateral third fingers  No synovitis on exam                Recent Results (from the past 504 hour(s))   Vitamin D    Collection Time: 05/30/18  8:48 AM   Result Value Ref Range    Vit D, 25-Hydroxy 34 30 - 96 ng/mL   CBC auto differential    Collection Time: 05/30/18  8:48 AM   Result Value Ref Range    WBC 5.77 3.90 - 12.70 K/uL    RBC 4.34 (L) 4.60 - 6.20 M/uL    Hemoglobin 14.0 14.0 - 18.0 g/dL    Hematocrit 40.9 40.0 - 54.0 %    MCV 94 82 - 98 fL    MCH 32.3 (H) 27.0 - 31.0 pg    MCHC  34.2 32.0 - 36.0 g/dL    RDW 12.8 11.5 - 14.5 %    Platelets 236 150 - 350 K/uL    MPV 11.0 9.2 - 12.9 fL    Gran # (ANC) 3.4 1.8 - 7.7 K/uL    Lymph # 1.6 1.0 - 4.8 K/uL    Mono # 0.6 0.3 - 1.0 K/uL    Eos # 0.2 0.0 - 0.5 K/uL    Baso # 0.02 0.00 - 0.20 K/uL    Gran% 59.0 38.0 - 73.0 %    Lymph% 26.9 18.0 - 48.0 %    Mono% 10.2 4.0 - 15.0 %    Eosinophil% 3.6 0.0 - 8.0 %    Basophil% 0.3 0.0 - 1.9 %    Differential Method Automated    Comprehensive metabolic panel    Collection Time: 05/30/18  8:48 AM   Result Value Ref Range    Sodium 139 136 - 145 mmol/L    Potassium 4.3 3.5 - 5.1 mmol/L    Chloride 105 95 - 110 mmol/L    CO2 25 23 - 29 mmol/L    Glucose 262 (H) 70 - 110 mg/dL    BUN, Bld 21 8 - 23 mg/dL    Creatinine 1.3 0.5 - 1.4 mg/dL    Calcium 10.1 8.7 - 10.5 mg/dL    Total Protein 7.3 6.0 - 8.4 g/dL    Albumin 4.4 3.5 - 5.2 g/dL    Total Bilirubin 1.0 0.1 - 1.0 mg/dL    Alkaline Phosphatase 52 (L) 55 - 135 U/L    AST 21 10 - 40 U/L    ALT 20 10 - 44 U/L    Anion Gap 9 8 - 16 mmol/L    eGFR if African American >60.0 >60 mL/min/1.73 m^2    eGFR if non  52.6 (A) >60 mL/min/1.73 m^2   C-reactive protein    Collection Time: 05/30/18  8:48 AM   Result Value Ref Range    CRP 0.8 0.0 - 8.2 mg/L   Sedimentation rate, manual    Collection Time: 05/30/18  8:48 AM   Result Value Ref Range    Sed Rate 5 0 - 10 mm/Hr         No results found for this or any previous visit (from the past 168 hour(s)).           Assessment:       1. Primary osteoarthritis involving multiple joints    2. Chronic kidney disease, stage 3    3. PMR (polymyalgia rheumatica)          1. PMR-  Now off prednisone- stable Esr 5-no signs of activity     2. Trigger finger will middle fingers-resolved post tendon sheath injection    3. CKD and DM  not able to take nsaids- renal function looks good eGFR 53    4. OA generalized    5. Dupuytren's contracture- early and mild will middle flexor tendon    6. Long term steroid use - normal dexa  2014- repeat dexa 6/6/18- normal with stable bmd compared back to 2014    Plan:         Remain off prednisone, no need to add back    Repeat dexa 3-4 yrs    Topical voltaren gel qid prn     Continue to Try tummeric 1500 mg  Continue glucosamine and fish oil  Tylenol for pain , avoid nonsteroidals    At home exercise for dupuytren contracture, paraffin and range of motion exercises    rtc 6 weeks  with CBC, CMP, sedimentation rate and CRP

## 2018-07-02 DIAGNOSIS — Z86.010 HISTORY OF COLON POLYPS: Primary | ICD-10-CM

## 2018-07-06 DIAGNOSIS — Z12.11 SPECIAL SCREENING FOR MALIGNANT NEOPLASMS, COLON: Primary | ICD-10-CM

## 2018-07-06 RX ORDER — SODIUM, POTASSIUM,MAG SULFATES 17.5-3.13G
1 SOLUTION, RECONSTITUTED, ORAL ORAL DAILY
Qty: 1 KIT | Refills: 0 | Status: SHIPPED | OUTPATIENT
Start: 2018-07-06 | End: 2018-07-08

## 2018-07-25 ENCOUNTER — PES CALL (OUTPATIENT)
Dept: ADMINISTRATIVE | Facility: CLINIC | Age: 78
End: 2018-07-25

## 2018-07-30 ENCOUNTER — PES CALL (OUTPATIENT)
Dept: ADMINISTRATIVE | Facility: CLINIC | Age: 78
End: 2018-07-30

## 2018-08-16 ENCOUNTER — LAB VISIT (OUTPATIENT)
Dept: LAB | Facility: HOSPITAL | Age: 78
End: 2018-08-16
Attending: FAMILY MEDICINE
Payer: MEDICARE

## 2018-08-16 ENCOUNTER — OFFICE VISIT (OUTPATIENT)
Dept: FAMILY MEDICINE | Facility: CLINIC | Age: 78
End: 2018-08-16
Payer: MEDICARE

## 2018-08-16 VITALS
SYSTOLIC BLOOD PRESSURE: 119 MMHG | DIASTOLIC BLOOD PRESSURE: 52 MMHG | TEMPERATURE: 99 F | HEIGHT: 75 IN | WEIGHT: 179.81 LBS | HEART RATE: 74 BPM | BODY MASS INDEX: 22.36 KG/M2

## 2018-08-16 DIAGNOSIS — M35.3 PMR (POLYMYALGIA RHEUMATICA): ICD-10-CM

## 2018-08-16 DIAGNOSIS — Z79.51 LONG TERM CURRENT USE OF INHALED STEROID: ICD-10-CM

## 2018-08-16 DIAGNOSIS — M54.50 CHRONIC MIDLINE LOW BACK PAIN WITHOUT SCIATICA: ICD-10-CM

## 2018-08-16 DIAGNOSIS — I65.23 ATHEROSCLEROSIS OF BOTH CAROTID ARTERIES: ICD-10-CM

## 2018-08-16 DIAGNOSIS — K57.30 DIVERTICULOSIS OF LARGE INTESTINE WITHOUT HEMORRHAGE: ICD-10-CM

## 2018-08-16 DIAGNOSIS — N18.30 TYPE 2 DIABETES MELLITUS WITH STAGE 3 CHRONIC KIDNEY DISEASE, WITHOUT LONG-TERM CURRENT USE OF INSULIN: ICD-10-CM

## 2018-08-16 DIAGNOSIS — E11.40 TYPE 2 DIABETES MELLITUS WITH DIABETIC NEUROPATHY, WITHOUT LONG-TERM CURRENT USE OF INSULIN: Primary | ICD-10-CM

## 2018-08-16 DIAGNOSIS — G31.84 MCI (MILD COGNITIVE IMPAIRMENT): ICD-10-CM

## 2018-08-16 DIAGNOSIS — G20.C PARKINSONISM, UNSPECIFIED PARKINSONISM TYPE: ICD-10-CM

## 2018-08-16 DIAGNOSIS — Z85.46 HISTORY OF PROSTATE CANCER: ICD-10-CM

## 2018-08-16 DIAGNOSIS — M72.0 DUPUYTREN'S CONTRACTURE OF BOTH HANDS: ICD-10-CM

## 2018-08-16 DIAGNOSIS — E11.69 COMBINED HYPERLIPIDEMIA ASSOCIATED WITH TYPE 2 DIABETES MELLITUS: ICD-10-CM

## 2018-08-16 DIAGNOSIS — I63.81 LACUNAR STROKE: ICD-10-CM

## 2018-08-16 DIAGNOSIS — G89.29 CHRONIC LEFT SHOULDER PAIN: ICD-10-CM

## 2018-08-16 DIAGNOSIS — E11.22 TYPE 2 DIABETES MELLITUS WITH STAGE 3 CHRONIC KIDNEY DISEASE, WITHOUT LONG-TERM CURRENT USE OF INSULIN: ICD-10-CM

## 2018-08-16 DIAGNOSIS — E78.2 COMBINED HYPERLIPIDEMIA ASSOCIATED WITH TYPE 2 DIABETES MELLITUS: ICD-10-CM

## 2018-08-16 DIAGNOSIS — M17.12 PRIMARY OSTEOARTHRITIS OF LEFT KNEE: ICD-10-CM

## 2018-08-16 DIAGNOSIS — G89.29 CHRONIC GLUTEAL PAIN: ICD-10-CM

## 2018-08-16 DIAGNOSIS — E11.59 HYPERTENSION ASSOCIATED WITH DIABETES: ICD-10-CM

## 2018-08-16 DIAGNOSIS — M79.18 CHRONIC GLUTEAL PAIN: ICD-10-CM

## 2018-08-16 DIAGNOSIS — G89.29 CHRONIC MIDLINE LOW BACK PAIN WITHOUT SCIATICA: ICD-10-CM

## 2018-08-16 DIAGNOSIS — E11.9 TYPE 2 DIABETES MELLITUS WITHOUT COMPLICATION, WITHOUT LONG-TERM CURRENT USE OF INSULIN: ICD-10-CM

## 2018-08-16 DIAGNOSIS — Z86.010 H/O ADENOMATOUS POLYP OF COLON: ICD-10-CM

## 2018-08-16 DIAGNOSIS — I15.2 HYPERTENSION ASSOCIATED WITH DIABETES: ICD-10-CM

## 2018-08-16 DIAGNOSIS — F10.10 EXCESSIVE DRINKING ALCOHOL: ICD-10-CM

## 2018-08-16 DIAGNOSIS — N18.30 CHRONIC KIDNEY DISEASE, STAGE 3: ICD-10-CM

## 2018-08-16 DIAGNOSIS — M15.9 PRIMARY OSTEOARTHRITIS INVOLVING MULTIPLE JOINTS: ICD-10-CM

## 2018-08-16 DIAGNOSIS — M25.512 CHRONIC LEFT SHOULDER PAIN: ICD-10-CM

## 2018-08-16 LAB
ESTIMATED AVG GLUCOSE: 140 MG/DL
HBA1C MFR BLD HPLC: 6.5 %

## 2018-08-16 PROCEDURE — 83036 HEMOGLOBIN GLYCOSYLATED A1C: CPT

## 2018-08-16 PROCEDURE — 99999 PR PBB SHADOW E&M-EST. PATIENT-LVL III: CPT | Mod: PBBFAC,,, | Performed by: NURSE PRACTITIONER

## 2018-08-16 PROCEDURE — 36415 COLL VENOUS BLD VENIPUNCTURE: CPT | Mod: PO

## 2018-08-16 PROCEDURE — 3074F SYST BP LT 130 MM HG: CPT | Mod: CPTII,S$GLB,, | Performed by: NURSE PRACTITIONER

## 2018-08-16 PROCEDURE — G0439 PPPS, SUBSEQ VISIT: HCPCS | Mod: S$GLB,,, | Performed by: NURSE PRACTITIONER

## 2018-08-16 PROCEDURE — 3078F DIAST BP <80 MM HG: CPT | Mod: CPTII,S$GLB,, | Performed by: NURSE PRACTITIONER

## 2018-08-16 RX ORDER — DICLOFENAC SODIUM 10 MG/G
GEL TOPICAL
Refills: 3 | COMMUNITY
Start: 2018-06-07 | End: 2019-05-15

## 2018-08-17 DIAGNOSIS — E11.59 HYPERTENSION ASSOCIATED WITH DIABETES: Primary | ICD-10-CM

## 2018-08-17 DIAGNOSIS — I15.2 HYPERTENSION ASSOCIATED WITH DIABETES: Primary | ICD-10-CM

## 2018-08-17 DIAGNOSIS — E78.5 HYPERLIPIDEMIA, UNSPECIFIED HYPERLIPIDEMIA TYPE: Primary | ICD-10-CM

## 2018-08-21 NOTE — PROGRESS NOTES
"Jagdeep Greenberg presented for a  Medicare AWV and comprehensive Health Risk Assessment today. The following components were reviewed and updated:    · Medical history  · Family History  · Social history  · Allergies and Current Medications  · Health Risk Assessment  · Health Maintenance  · Care Team     ** See Completed Assessments for Annual Wellness Visit within the encounter summary.**       The following assessments were completed:  · Living Situation  · CAGE  · Depression Screening  · Timed Get Up and Go  · Whisper Test  · Cognitive Function Screening  · Nutrition Screening  · ADL Screening  · PAQ Screening    Vitals:    08/16/18 1038   BP: (!) 119/52   Pulse: 74   Temp: 98.6 °F (37 °C)   TempSrc: Oral   Weight: 81.6 kg (179 lb 12.8 oz)   Height: 6' 3" (1.905 m)     Body mass index is 22.47 kg/m².  Physical Exam   Constitutional: He is oriented to person, place, and time. He appears well-developed. No distress.   HENT:   Head: Normocephalic and atraumatic.   Eyes: EOM are normal. Pupils are equal, round, and reactive to light.   Neck: Normal range of motion. Neck supple.   Cardiovascular: Normal rate and regular rhythm.   Pulmonary/Chest: Effort normal and breath sounds normal.   Musculoskeletal: Normal range of motion.   Neurological: He is alert and oriented to person, place, and time.   Skin: Skin is warm and dry. No rash noted.   Psychiatric: He has a normal mood and affect. Thought content normal.   Nursing note and vitals reviewed.        Diagnoses and health risks identified today and associated recommendations/orders:    1. Type 2 diabetes mellitus with diabetic neuropathy, without long-term current use of insulin  Stable and controlled on metformin, amaryl  Continue medication as prescribed  Continue current treatment plan as previously prescribed with your PCP      2. Type 2 diabetes mellitus with stage 3 chronic kidney disease, without long-term current use of insulin  Stable and controlled on " metformin, amaryl  Continue medication as prescribed  Continue current treatment plan as previously prescribed with your PCP      3. Chronic kidney disease, stage 3  Stable- routine labs, control of diabetes    4. Hypertension associated with diabetes  Stable and controlled on norvasc, benazepril HCT  Continue medication as prescribed  Continue current treatment plan as previously prescribed with your PCP      5. Combined hyperlipidemia associated with type 2 diabetes mellitus  Stable and controlled on pravastatin  Continue medication as prescribed  Continue current treatment plan as previously prescribed with your PCP      6. Atherosclerosis of both carotid arteries  Stable- routine labs and follow up    7. PMR (polymyalgia rheumatica)  Stable and controlled on prednisone  Continue medication as prescribed  Continue current treatment plan as previously prescribed with your PCP      8. Parkinsonism, unspecified Parkinsonism type  Stable and controlled on carbidopa-levodopa   Continue medication as prescribed  Continue current treatment plan as previously prescribed with your PCP      9. Lacunar stroke  Stable- routine follow up    10. MCI (mild cognitive impairment)  Stable- routine follow up    11. Excessive drinking alcohol  Uncontrolled- encourage limiting alcohol use    12. History of prostate cancer  Stable- routine follow up and labs    13. Diverticulosis of large intestine without hemorrhage  Stable- routine colonoscopy, follow up for symptoms, routine labs    14. H/O adenomatous polyp of colon  Stable- routine colonoscopy    15. Primary osteoarthritis involving multiple joints  Stable- follow up for worsening symptoms    16. Chronic midline low back pain without sciatica  Stable- follow up for worsening symptoms    17. Chronic gluteal pain  Stable- follow up for worsening symptoms    18. Chronic left shoulder pain  Stable- follow up for worsening symptoms    19. Dupuytren's contracture of both hands  Stable-  follow up for worsening symptoms    20. Primary osteoarthritis of left knee  Stable- follow up for worsening symptoms    21. Long term current use of inhaled steroid  Stable- routine monitoring      Provided Jagdeep with a 5-10 year written screening schedule and personal prevention plan. Recommendations were developed using the USPSTF age appropriate recommendations. Education, counseling, and referrals were provided as needed. After Visit Summary printed and given to patient which includes a list of additional screenings\tests needed.    Follow-up if symptoms worsen or fail to improve, for Routine scheduled appointment.    Juan Aceves NP

## 2018-08-23 ENCOUNTER — HOSPITAL ENCOUNTER (OUTPATIENT)
Facility: HOSPITAL | Age: 78
Discharge: HOME | End: 2018-08-23
Attending: COLON & RECTAL SURGERY | Admitting: COLON & RECTAL SURGERY
Payer: MEDICARE

## 2018-08-23 ENCOUNTER — ANESTHESIA EVENT (OUTPATIENT)
Dept: ENDOSCOPY | Facility: HOSPITAL | Age: 78
End: 2018-08-23
Payer: MEDICARE

## 2018-08-23 ENCOUNTER — ANESTHESIA (OUTPATIENT)
Dept: ENDOSCOPY | Facility: HOSPITAL | Age: 78
End: 2018-08-23
Payer: MEDICARE

## 2018-08-23 VITALS
WEIGHT: 173 LBS | OXYGEN SATURATION: 99 % | SYSTOLIC BLOOD PRESSURE: 143 MMHG | HEART RATE: 60 BPM | BODY MASS INDEX: 21.51 KG/M2 | TEMPERATURE: 98 F | RESPIRATION RATE: 17 BRPM | HEIGHT: 75 IN | DIASTOLIC BLOOD PRESSURE: 67 MMHG

## 2018-08-23 DIAGNOSIS — Z86.010 H/O ADENOMATOUS POLYP OF COLON: Primary | ICD-10-CM

## 2018-08-23 DIAGNOSIS — Z12.11 SCREENING FOR COLON CANCER: ICD-10-CM

## 2018-08-23 DIAGNOSIS — Z86.010 HISTORY OF COLON POLYPS: ICD-10-CM

## 2018-08-23 LAB — POCT GLUCOSE: 242 MG/DL (ref 70–110)

## 2018-08-23 PROCEDURE — 25000003 PHARM REV CODE 250: Performed by: NURSE ANESTHETIST, CERTIFIED REGISTERED

## 2018-08-23 PROCEDURE — G0105 COLORECTAL SCRN; HI RISK IND: HCPCS | Mod: ,,, | Performed by: COLON & RECTAL SURGERY

## 2018-08-23 PROCEDURE — 63600175 PHARM REV CODE 636 W HCPCS: Performed by: NURSE ANESTHETIST, CERTIFIED REGISTERED

## 2018-08-23 PROCEDURE — 37000009 HC ANESTHESIA EA ADD 15 MINS: Performed by: COLON & RECTAL SURGERY

## 2018-08-23 PROCEDURE — G0105 COLORECTAL SCRN; HI RISK IND: HCPCS | Performed by: COLON & RECTAL SURGERY

## 2018-08-23 PROCEDURE — 82962 GLUCOSE BLOOD TEST: CPT | Performed by: COLON & RECTAL SURGERY

## 2018-08-23 PROCEDURE — 37000008 HC ANESTHESIA 1ST 15 MINUTES: Performed by: COLON & RECTAL SURGERY

## 2018-08-23 PROCEDURE — 25000003 PHARM REV CODE 250: Performed by: NURSE PRACTITIONER

## 2018-08-23 PROCEDURE — E9220 PRA ENDO ANESTHESIA: HCPCS | Mod: ,,, | Performed by: NURSE ANESTHETIST, CERTIFIED REGISTERED

## 2018-08-23 RX ORDER — LIDOCAINE HCL/PF 100 MG/5ML
SYRINGE (ML) INTRAVENOUS
Status: DISCONTINUED | OUTPATIENT
Start: 2018-08-23 | End: 2018-08-23

## 2018-08-23 RX ORDER — PROPOFOL 10 MG/ML
VIAL (ML) INTRAVENOUS
Status: DISCONTINUED | OUTPATIENT
Start: 2018-08-23 | End: 2018-08-23

## 2018-08-23 RX ORDER — SODIUM CHLORIDE 9 MG/ML
INJECTION, SOLUTION INTRAVENOUS CONTINUOUS
Status: DISCONTINUED | OUTPATIENT
Start: 2018-08-23 | End: 2018-08-23 | Stop reason: HOSPADM

## 2018-08-23 RX ORDER — SODIUM CHLORIDE 9 MG/ML
INJECTION, SOLUTION INTRAVENOUS CONTINUOUS PRN
Status: DISCONTINUED | OUTPATIENT
Start: 2018-08-23 | End: 2018-08-23

## 2018-08-23 RX ORDER — PROPOFOL 10 MG/ML
VIAL (ML) INTRAVENOUS CONTINUOUS PRN
Status: DISCONTINUED | OUTPATIENT
Start: 2018-08-23 | End: 2018-08-23

## 2018-08-23 RX ORDER — SODIUM CHLORIDE 0.9 % (FLUSH) 0.9 %
3 SYRINGE (ML) INJECTION
Status: DISCONTINUED | OUTPATIENT
Start: 2018-08-23 | End: 2018-08-23 | Stop reason: HOSPADM

## 2018-08-23 RX ADMIN — SODIUM CHLORIDE: 0.9 INJECTION, SOLUTION INTRAVENOUS at 12:08

## 2018-08-23 RX ADMIN — SODIUM CHLORIDE: 0.9 INJECTION, SOLUTION INTRAVENOUS at 10:08

## 2018-08-23 RX ADMIN — PROPOFOL 30 MG: 10 INJECTION, EMULSION INTRAVENOUS at 12:08

## 2018-08-23 RX ADMIN — PROPOFOL 80 MG: 10 INJECTION, EMULSION INTRAVENOUS at 12:08

## 2018-08-23 RX ADMIN — PROPOFOL 150 MCG/KG/MIN: 10 INJECTION, EMULSION INTRAVENOUS at 12:08

## 2018-08-23 RX ADMIN — LIDOCAINE HYDROCHLORIDE 100 MG: 20 INJECTION, SOLUTION INTRAVENOUS at 12:08

## 2018-08-23 NOTE — PLAN OF CARE
Discharge instructions given including s/s to notify MD or return to ED, no to operate heavy machinery and follow up. Pt verbalized understanding. Pt refused wheelchair. Ambulated off unit with family. Steady gait. Denies pain. Denies n/v. No distress noted

## 2018-08-23 NOTE — ANESTHESIA PREPROCEDURE EVALUATION
08/23/2018  Jagdeep Greenberg is a 77 y.o., male.  Past Medical History:   Diagnosis Date    Adenomatous polyp of colon     Arthritis     Back pain     Carotid artery plaque     Less than 50%    Chronic kidney disease, stage 3     Diabetes mellitus, type 2     Diabetes with neurologic complications     Erectile dysfunction following radical prostatectomy     Fractures     Hyperlipidemia LDL goal < 100     Hypertension goal BP (blood pressure) < 130/80     Lacunar stroke 8/17/2017    MCI (mild cognitive impairment)     Parkinson disease     Parkinsonism 4/11/2017    Prostate cancer 2001    Renal manifestation of secondary diabetes mellitus     Stroke      Past Surgical History:   Procedure Laterality Date    APPENDECTOMY      COLONOSCOPY  In 3/2/2010    Repeat 5 years    FRACTURE SURGERY      HERNIA REPAIR      LYMPH NODE DISSECTION      Pelvic    PROSTATECTOMY      SHOULDER ARTHROSCOPY      Right         Anesthesia Evaluation    I have reviewed the Patient Summary Reports.    I have reviewed the Nursing Notes.   I have reviewed the Medications.     Review of Systems  Anesthesia Hx:  No problems with previous Anesthesia  Neg history of prior surgery. Denies Family Hx of Anesthesia complications.   Denies Personal Hx of Anesthesia complications.   Hematology/Oncology:  Hematology Normal   Oncology Normal     EENT/Dental:EENT/Dental Normal   Cardiovascular:   Hypertension, well controlled CAD      Pulmonary:  Pulmonary Normal    Renal/:   Chronic Renal Disease    Hepatic/GI:  Hepatic/GI Normal    Musculoskeletal:   Arthritis     Neurological:   CVA Neuromuscular Disease,    Endocrine:   Diabetes, well controlled    Dermatological:  Skin Normal    Psych:  Psychiatric Normal           Physical Exam  General:  Well nourished    Airway/Jaw/Neck:  Airway Findings: Mouth Opening: Normal  Tongue: Normal  General Airway Assessment: Adult  Mallampati: II  Improves to II with phonation.  TM Distance: Normal, at least 6 cm        Eyes/Ears/Nose:  EYES/EARS/NOSE FINDINGS: Normal   Dental:  DENTAL FINDINGS: Normal   Chest/Lungs:  Chest/Lungs Findings: Clear to auscultation, Normal Respiratory Rate     Heart/Vascular:  Heart Findings: Rate: Normal  Rhythm: Regular Rhythm  Sounds: Normal  Heart murmur: negative Vascular Findings: Normal    Abdomen:  Abdomen Findings: Normal    Musculoskeletal:  Musculoskeletal Findings: Normal   Skin:  Skin Findings: Normal    Mental Status:  Mental Status Findings: Normal        Anesthesia Plan  Type of Anesthesia, risks & benefits discussed:  Anesthesia Type:  general  Patient's Preference:   Intra-op Monitoring Plan: standard ASA monitors  Intra-op Monitoring Plan Comments:   Post Op Pain Control Plan:   Post Op Pain Control Plan Comments:   Induction:   IV  Beta Blocker:  Patient is not currently on a Beta-Blocker (No further documentation required).       Informed Consent: Patient understands risks and agrees with Anesthesia plan.  Questions answered. Anesthesia consent signed with patient.  ASA Score: 3     Day of Surgery Review of History & Physical:    H&P update referred to the provider.         Ready For Surgery From Anesthesia Perspective.

## 2018-08-23 NOTE — PROVATION PATIENT INSTRUCTIONS
Discharge Summary/Instructions after an Endoscopic Procedure  Patient Name: Jagdeep Greenberg  Patient MRN: [id number]  Patient YOB: 1940 Thursday, August 23, 2018  Nghia Cooper MD  RESTRICTIONS:  During your procedure today, you received medications for sedation.  These   medications may affect your judgment, balance and coordination.  Therefore,   for 24 hours, you have the following restrictions:   - DO NOT drive a car, operate machinery, make legal/financial decisions,   sign important papers or drink alcohol.    ACTIVITY:  Today: no heavy lifting, straining or running due to procedural   sedation/anesthesia.  The following day: return to full activity including work.  DIET:  Eat and drink normally unless instructed otherwise.     TREATMENT FOR COMMON SIDE EFFECTS:  - Mild abdominal pain, nausea, belching, bloating or excessive gas:  rest,   eat lightly and use a heating pad.  - Sore Throat: treat with throat lozenges and/or gargle with warm salt   water.  - Because air was used during the procedure, expelling large amounts of air   from your rectum or belching is normal.  - If a bowel prep was taken, you may not have a bowel movement for 1-3 days.    This is normal.  SYMPTOMS TO WATCH FOR AND REPORT TO YOUR PHYSICIAN:  1. Abdominal pain or bloating, other than gas cramps.  2. Chest pain.  3. Back pain.  4. Signs of infection such as: chills or fever occurring within 24 hours   after the procedure.  5. Rectal bleeding, which would show as bright red, maroon, or black stools.   (A tablespoon of blood from the rectum is not serious, especially if   hemorrhoids are present.)  6. Vomiting.  7. Weakness or dizziness.  GO DIRECTLY TO THE NEAREST EMERGENCY ROOM IF YOU HAVE ANY OF THE FOLLOWING:      Difficulty breathing              Chills and/or fever over 101 F   Persistent vomiting and/or vomiting blood   Severe abdominal pain   Severe chest pain   Black, tarry stools   Bleeding- more than one  tablespoon   Any other symptom or condition that you feel may need urgent attention  Your doctor recommends these additional instructions:  If any biopsies were taken, your doctors clinic will contact you in 1 to 2   weeks with any results.  - Patient has a contact number available for emergencies.  The signs and   symptoms of potential delayed complications were discussed with the   patient.  Return to normal activities tomorrow.  Written discharge   instructions were provided to the patient.   - Discharge patient to home (ambulatory).   - Resume regular diet indefinitely.   - Repeat colonoscopy in 3 years for surveillance.  For questions, problems or results please call your physician - Nghia Cooper MD at Work:  [phone].  [facility], EMERGENCY ROOM PHONE NUMBER: [phone]  IF A COMPLICATION OR EMERGENCY SITUATION ARISES AND YOU ARE UNABLE TO REACH   YOUR PHYSICIAN - GO DIRECTLY TO THE EMERGENCY ROOM.  Nghia Cooper MD  8/23/2018 12:54:44 PM  This report has been verified and signed electronically.  [employer]

## 2018-08-23 NOTE — H&P
Endoscopy H&P    Procedure : Colonoscopy      personal history of colon polyps and most recent endoscopic exam <1 years ago with EMR    PATH 1/9/2018  Hepatic flexure polyp, biopsy:  - Tubular adenoma.    PATH: 10/24/2017  1. Cecum polyp, biopsy:  Tubular adenoma.  2. Proximal ascending colon polyp, biopsy:  Tubular adenoma.  3. Ascending colon polyp, biopsy:  Sessile serrated polyp.  Negative for dysplasia.  4. Ascending colon, abnormal mucosa, biopsy:  Fragments of tubular adenoma.  5. Transverse colon polyp, biopsy:  Tubular adenoma.    Past Medical History:   Diagnosis Date    Adenomatous polyp of colon     Arthritis     Back pain     Carotid artery plaque     Less than 50%    Chronic kidney disease, stage 3     Diabetes mellitus, type 2     Diabetes with neurologic complications     Erectile dysfunction following radical prostatectomy     Fractures     Hyperlipidemia LDL goal < 100     Hypertension goal BP (blood pressure) < 130/80     Lacunar stroke 8/17/2017    MCI (mild cognitive impairment)     Parkinson disease     Parkinsonism 4/11/2017    Prostate cancer 2001    Renal manifestation of secondary diabetes mellitus     Stroke              Review of Systems -ROS:  GENERAL: No fever, chills, fatigability or weight loss.  CHEST: Denies ANDRE, cyanosis, wheezing, cough and sputum production.  CARDIOVASCULAR: Denies chest pain, PND, orthopnea or reduced exercise tolerance.   Musculoskeletal ROS: negative for - gait disturbance or joint pain  Neurological ROS: negative for - confusion or memory loss        Physical Exam:  General: well developed, well nourished, no distress  Head: normocephalic  Neck: supple, symmetrical, trachea midline  Lungs:  clear to auscultation bilaterally and normal respiratory effort  Heart: regular rate and rhythm, S1, S2 normal, no murmur, rub or gallop and regular rate and rhythm  Abdomen: soft, non-tender non-distented; bowel sounds normal; no masses,  no  organomegaly  Extremities: no cyanosis or edema, or clubbing       Moderate Sedation (choice): Mallampati Score 1    ASA : III    IMP: personal history of colon polyps and most recent endoscopic exam 2 years ago    Plan: Colonoscopy with Moderate sedation.  I have explained the procedure including indications, alternatives, expected outcomes and potential complications. The patient appears to understand and gives informed consent. The patient is medically ready for surgery.

## 2018-08-23 NOTE — ANESTHESIA POSTPROCEDURE EVALUATION
"Anesthesia Post Evaluation    Patient: Jagdeep Greenberg    Procedure(s) Performed: Procedure(s) (LRB):  COLONOSCOPY (N/A)    Final Anesthesia Type: general  Patient location during evaluation: GI PACU  Patient participation: Yes- Able to Participate  Level of consciousness: awake and alert  Post-procedure vital signs: reviewed and stable  Pain management: adequate  Airway patency: patent  PONV status at discharge: No PONV  Anesthetic complications: no      Cardiovascular status: blood pressure returned to baseline  Respiratory status: unassisted, spontaneous ventilation and room air  Hydration status: euvolemic  Follow-up not needed.        Visit Vitals  BP (!) 143/67 (BP Location: Left arm, Patient Position: Lying)   Pulse 60   Temp 36.7 °C (98.1 °F) (Temporal)   Resp 17   Ht 6' 3" (1.905 m)   Wt 78.5 kg (173 lb)   SpO2 99%   BMI 21.62 kg/m²       Pain/Chirag Score: Presence of Pain: denies (8/23/2018  1:25 PM)  Chirag Score: 10 (8/23/2018  1:25 PM)        "

## 2018-08-23 NOTE — DISCHARGE INSTRUCTIONS
Colonoscopy     A camera attached to a flexible tube with a viewing lens is used to take video pictures.     Colonoscopy is a test to view the inside of your lower digestive tract (colon and rectum). Sometimes it can show the last part of the small intestine (ileum). During the test, small pieces of tissue may be removed for testing. This is called a biopsy. Small growths, such as polyps, may also be removed.   Why is colonoscopy done?  The test is done to help look for colon cancer. And it can help find the source of abdominal pain, bleeding, and changes in bowel habits. It may be needed once a year, depending on factors such as your:  · Age  · Health history  · Family health history  · Symptoms  · Results from any prior colonoscopy  Risks and possible complications  These include:  · Bleeding               · A puncture or tear in the colon   · Risks of anesthesia  · A cancer lesion not being seen  Getting ready   To prepare for the test:  · Talk with your healthcare provider about the risks of the test (see below). Also ask your healthcare provider about alternatives to the test.  · Tell your healthcare provider about any medicines you take. Also tell him or her about any health conditions you may have.  · Make sure your rectum and colon are empty for the test. Follow the diet and bowel prep instructions exactly. If you dont, the test may need to be rescheduled.  · Plan for a friend or family member to drive you home after the test.     Colonoscopy provides an inside view of the entire colon.     You may discuss the results with your doctor right away or at a future visit.  During the test   The test is usually done in the hospital on an outpatient basis. This means you go home the same day. The procedure takes about 30 minutes. During that time:  · You are given relaxing (sedating) medicine through an IV line. You may be drowsy, or fully asleep.  · The healthcare provider will first give you a physical exam to  check for anal and rectal problems.  · Then the anus is lubricated and the scope inserted.  · If you are awake, you may have a feeling similar to needing to have a bowel movement. You may also feel pressure as air is pumped into the colon. Its OK to pass gas during the procedure.  · Biopsy, polyp removal, or other treatments may be done during the test.  After the test   You may have gas right after the test. It can help to try to pass it to help prevent later bloating. Your healthcare provider may discuss the results with you right away. Or you may need to schedule a follow-up visit to talk about the results. After the test, you can go back to your normal eating and other activities. You may be tired from the sedation and need to rest for a few hours.  Date Last Reviewed: 11/1/2016 © 2000-2017 [employer]. [address] [place] [address], [place] [address]. All rights reserved. This information is not intended as a substitute for professional medical care. Always follow your healthcare professional's instructions.

## 2018-08-23 NOTE — TRANSFER OF CARE
"Anesthesia Transfer of Care Note    Patient: Jagdeep Greenberg    Procedure(s) Performed: Procedure(s) (LRB):  COLONOSCOPY (N/A)    Patient location: PACU    Anesthesia Type: general    Transport from OR: Transported from OR on room air with adequate spontaneous ventilation    Post pain: adequate analgesia    Post assessment: no apparent anesthetic complications and tolerated procedure well    Post vital signs: stable    Level of consciousness: awake, alert and oriented    Nausea/Vomiting: no nausea/vomiting    Complications: none    Transfer of care protocol was followed      Last vitals:   Visit Vitals  BP (!) 140/70 (BP Location: Left arm, Patient Position: Lying)   Pulse 68   Temp 36.9 °C (98.5 °F) (Skin)   Resp 14   Ht 6' 3" (1.905 m)   Wt 78.5 kg (173 lb)   SpO2 98%   BMI 21.62 kg/m²     "

## 2018-08-30 ENCOUNTER — TELEPHONE (OUTPATIENT)
Dept: ENDOSCOPY | Facility: HOSPITAL | Age: 78
End: 2018-08-30

## 2018-10-31 ENCOUNTER — OFFICE VISIT (OUTPATIENT)
Dept: NEUROLOGY | Facility: CLINIC | Age: 78
End: 2018-10-31
Payer: MEDICARE

## 2018-10-31 VITALS
HEIGHT: 75 IN | WEIGHT: 173.06 LBS | SYSTOLIC BLOOD PRESSURE: 134 MMHG | DIASTOLIC BLOOD PRESSURE: 73 MMHG | HEART RATE: 69 BPM | BODY MASS INDEX: 21.52 KG/M2

## 2018-10-31 DIAGNOSIS — G31.84 MCI (MILD COGNITIVE IMPAIRMENT): ICD-10-CM

## 2018-10-31 DIAGNOSIS — I63.81 LACUNAR STROKE: ICD-10-CM

## 2018-10-31 DIAGNOSIS — G20.C PARKINSONISM, UNSPECIFIED PARKINSONISM TYPE: ICD-10-CM

## 2018-10-31 PROCEDURE — 3078F DIAST BP <80 MM HG: CPT | Mod: CPTII,HCWC,, | Performed by: PSYCHIATRY & NEUROLOGY

## 2018-10-31 PROCEDURE — 99214 OFFICE O/P EST MOD 30 MIN: CPT | Mod: S$PBB,HCWC,, | Performed by: PSYCHIATRY & NEUROLOGY

## 2018-10-31 PROCEDURE — 3075F SYST BP GE 130 - 139MM HG: CPT | Mod: CPTII,HCWC,, | Performed by: PSYCHIATRY & NEUROLOGY

## 2018-10-31 PROCEDURE — 99499 UNLISTED E&M SERVICE: CPT | Mod: HCNC,S$GLB,, | Performed by: PSYCHIATRY & NEUROLOGY

## 2018-10-31 PROCEDURE — 1101F PT FALLS ASSESS-DOCD LE1/YR: CPT | Mod: CPTII,HCWC,, | Performed by: PSYCHIATRY & NEUROLOGY

## 2018-10-31 PROCEDURE — 99213 OFFICE O/P EST LOW 20 MIN: CPT | Mod: PBBFAC,HCWC,PO | Performed by: PSYCHIATRY & NEUROLOGY

## 2018-10-31 PROCEDURE — 99999 PR PBB SHADOW E&M-EST. PATIENT-LVL III: CPT | Mod: PBBFAC,HCWC,, | Performed by: PSYCHIATRY & NEUROLOGY

## 2018-10-31 RX ORDER — CARBIDOPA AND LEVODOPA 25; 100 MG/1; MG/1
0.5 TABLET ORAL 3 TIMES DAILY
Qty: 135 TABLET | Refills: 3 | Status: SHIPPED | OUTPATIENT
Start: 2018-10-31 | End: 2020-01-01 | Stop reason: SDUPTHER

## 2018-10-31 NOTE — PROGRESS NOTES
[facility] - NEUROLOGY EPILEPSY  [facility] [place]    Date: October 31, 2018   Patient Name: Jagdeep Greenberg   MRN: [id number]   PCP: Panda Contreras  Referring Provider: No ref. provider found    Assessmentn and Plan:   Jagdeep Greenberg is a 78 y.o. male presenting for follow-up MCI and Parkinsonism. Patient continues to do well. Should patient begin to experience frequent falls, will refer for LSVT BIG.    Problem List Items Addressed This Visit        Neuro    Parkinsonism    Current Assessment & Plan     -- continue current sinemet 1/2 tab TID         Lacunar stroke    Current Assessment & Plan     Continue ASA and pravastatin         MCI (mild cognitive impairment)    Current Assessment & Plan     Stable             Ernie Harden MD  [facility]   Department of Neurology    Patient note was created using Dragon Dictation.  Any errors in syntax or even information may not have been identified and edited on initial review prior to signing this note.  Subjective:   HPI:   Mr. Jagdeep Greenberg is a 78 y.o. male presenting in follow-up for parkinsonism, MCI, and history of stroke.  Patient presents today with his wife who contributes to the history.  They report that over the last 6 months he has been stable with no substantial changes in his cognition.  He does state that he occasionally he is slightly more unsteady but he remains active including working outdoors on uneven surfaces frequently without difficulty.  We discussed the possibility of  LSVT BIG PT however he declines this at this time.  He has no other new complaints today.    PAST MEDICAL HISTORY:  Past Medical History:   Diagnosis Date    Adenomatous polyp of colon     Arthritis     Back pain     Carotid artery plaque     Less than 50%    Chronic kidney disease, stage 3     Diabetes mellitus, type 2     Diabetes with neurologic complications     Erectile dysfunction following radical  prostatectomy     Fractures     Hyperlipidemia LDL goal < 100     Hypertension goal BP (blood pressure) < 130/80     Lacunar stroke 8/17/2017    MCI (mild cognitive impairment)     Parkinson disease     Parkinsonism 4/11/2017    Prostate cancer 2001    Renal manifestation of secondary diabetes mellitus     Stroke      PAST SURGICAL HISTORY:  Past Surgical History:   Procedure Laterality Date    APPENDECTOMY      COLONOSCOPY  In 3/2/2010    Repeat 5 years    COLONOSCOPY N/A 9/29/2015    Procedure: COLONOSCOPY;  Surgeon: Erik Brandt MD;  Location: [facility];  Service: Endoscopy;  Laterality: N/A;    COLONOSCOPY N/A 10/11/2016    Procedure: COLONOSCOPY;  Surgeon: Erik Brandt MD;  Location: [facility];  Service: Endoscopy;  Laterality: N/A;    COLONOSCOPY N/A 10/24/2017    Procedure: COLONOSCOPY;  Surgeon: Erik Brandt MD;  Location: [facility];  Service: Endoscopy;  Laterality: N/A;    COLONOSCOPY N/A 8/23/2018    Procedure: COLONOSCOPY;  Surgeon: Nghia Cooper MD;  Location: [facility] (4TH FLR);  Service: Endoscopy;  Laterality: N/A;    COLONOSCOPY N/A 8/23/2018    Performed by Nghia Cooper MD at [facility] (4TH FLR)    COLONOSCOPY N/A 10/24/2017    Performed by Erik [facility]t MD at [facility]    COLONOSCOPY N/A 10/11/2016    Performed by Erik [facility]t MD at [facility]    COLONOSCOPY N/A 9/29/2015    Performed by Erik [facility]t MD at [facility]    COLONOSCOPY - OPERATIVE N/A 1/9/2018    [facility] at [facility] OR 2ND F[facility]    FRACTURE SURGERY      HERNIA REPAIR      LYMPH NODE DISSECTION      Pelvic    POLYPECTOMY; endoscopic mucosal resection (EMR) N/A 1/9/2018    Performed by Nghia Cooper MD at [facility] OR 2ND FLR    PROSTATECTOMY      SHOULDER ARTHROSCOPY      Right     CURRENT MEDS:  Current Outpatient Medications   Medication Sig Dispense Refill    amlodipine (NORVASC) 10 MG tablet Take 1 tablet (10 mg total) by mouth once daily.  (Patient taking differently: Take 10 mg by mouth every morning. ) 90 tablet 3    aspirin (ECOTRIN) 81 MG EC tablet Take 1 tablet (81 mg total) by mouth once daily.  0    benazepril-hydrochlorthiazide (LOTENSIN HCT) 20-12.5 mg per tablet TAKE 1 TABLET BY MOUTH EVERY DAY 90 tablet 3    blood sugar diagnostic (GLUCOSE BLOOD) Strp Test glucose 1 x daily 35 strip prn    carbidopa-levodopa  mg (SINEMET)  mg per tablet Take 0.5 tablets by mouth 3 (three) times daily. 135 tablet 3    fish oil-omega-3 fatty acids 300-1,000 mg capsule Take 2 g by mouth once daily.      glimepiride (AMARYL) 2 MG tablet Take 1 tablet (2 mg total) by mouth daily with breakfast. 90 tablet 3    GLUC.METER,DIS.P-LOADED STRIPS (GLUCOSE METER, DISP & STRIPS) Kit Check glucose once per day 1 kit prn    GLUCOSAMINE HCL (GLUCOSAMINE, BULK, MISC) by Misc.(Non-Drug; Combo Route) route.      L. RHAMNOSUS GG/INULIN (CULTURELLE PROBIOTICS ORAL) Take by mouth.      lancets Misc As directed 100 each prn    MEN'S MULTI-VITAMIN ORAL Take by mouth.      metFORMIN (GLUCOPHAGE) 1000 MG tablet TAKE 1 TABLET BY MOUTH DAILY WITH BREAKFAST 90 tablet 3    pravastatin (PRAVACHOL) 20 MG tablet TAKE 1 TABLET BY MOUTH DAILY 90 tablet 3    turmeric root extract 500 mg Cap Take by mouth.      VOLTAREN 1 % Gel CRISTIAN 2 GRAMS EXT AA QID  3     No current facility-administered medications for this visit.      ALLERGIES:  Review of patient's allergies indicates:  No Known Allergies    FAMILY HISTORY:  Family History   Problem Relation Age of Onset    Leukemia Father 68    Anesthesia problems Neg Hx      SOCIAL HISTORY:  Social History     Tobacco Use    Smoking status: Never Smoker    Smokeless tobacco: Never Used   Substance Use Topics    Alcohol use: Yes     Alcohol/week: 21.0 oz     Types: 14 Cans of beer, 21 Shots of liquor per week     Comment: 3 drinks a day    Drug use: No     Review of Systems:  12 review of systems is negative except for the  "symptoms mentioned in HPI.      Objective:     Vitals:    10/31/18 1102   BP: 134/73   Pulse: 69   Weight: 78.5 kg (173 lb 1 oz)   Height: 6' 3" (1.905 m)     General: NAD, well nourished   Eyes: no tearing, discharge, no erythema   ENT: moist mucous membranes of the oral cavity, nares patent    Neck: Supple, full range of motion  Cardiovascular: Warm and well perfused, pulses equal and symmetrical  Lungs: Normal work of breathing, normal chest wall excursions  Skin: No rash, lesions, or breakdown on exposed skin  Psychiatry: Mood and affect are appropriate   Abdomen: soft, non tender, non distended  Extremeties: No cyanosis, clubbing or edema.    Neurological   MENTAL STATUS: Alert and oriented to person, place, and time. Attention and concentration within normal limits. Speech without dysarthria, able to name and repeat with moderate Recent and remote memory fair to poor   CRANIAL NERVES: Visual fields intact. PERRL. EOMI with slow saccades. Facial sensation intact. Face symmetrical with hypomimia. Hearing grossly intact. Full shoulder shrug bilaterally. Tongue protrudes midline   SENSORY: Sensation is intact to light touch throughout.    MOTOR: Normal bulk and mildly increased tone in RUE>LUE. No resting tremor noted.5/5 deltoid, biceps, triceps, interosseous, hand  bilaterally. 5/5 iliopsoas, knee extension/flexion, foot dorsi/plantarflexion bilaterally.    REFLEXES: Symmetric and 2+ throughout.   CEREBELLAR/COORDINATION/GAIT: Gait with normal stride and slightly reduced arm swing R>L. Negative pull back test. Finger to nose intact. Normal rapid alternating movements.     MOCA Results 17:   Visuospatial/Executive: 3/5  Namin/3  Attention:   Language: 1/3  Abstraction: 1  Delayed Recall: 0/5  Orientation:   Total:      "

## 2018-10-31 NOTE — PATIENT INSTRUCTIONS
Fall Prevention  Falls often occur due to slipping, tripping or losing your balance. Millions of people fall every year and injure themselves. Here are ways to reduce your risk of falling again.  · Think about your fall, was there anything that caused your fall that can be fixed, removed, or replaced?  · Make your home safe by keeping walkways clear of objects you may trip over.  · Use non-slip pads under rugs. Do not use area rugs or small throw rugs.  · Use non-slip mats in bathtubs and showers.  · Install handrails and lights on staircases.  · Do not walk in poorly lit areas.  · Do not stand on chairs or wobbly ladders.  · Use caution when reaching overhead or looking upward. This position can cause a loss of balance.  · Be sure your shoes fit properly, have non-slip bottoms and are in good condition.   · Wear shoes both inside and out. Avoid going barefoot or wearing slippers.  · Be cautious when going up and down stairs, curbs, and when walking on uneven sidewalks.  · If your balance is poor, consider using a cane or walker.  · If your fall was related to alcohol use, stop or limit alcohol intake.   · If your fall was related to use of sleeping medicines, talk to your doctor about this. You may need to reduce your dosage at bedtime if you awaken during the night to go to the bathroom.    · To reduce the need for nighttime bathroom trips:  ¨ Avoid drinking fluids for several hours before going to bed  ¨ Empty your bladder before going to bed  ¨ Men can keep a urinal at the bedside  · Stay as active as you can. Balance, flexibility, strength, and endurance all come from exercise. They all play a role in preventing falls. Ask your healthcare provider which types of activity are right for you.  · Get your vision checked on a regular basis.  · If you have pets, know where they are before you stand up or walk so you don't trip over them.  · Use night lights.  Date Last Reviewed: 11/5/2015  © 4196-0077 [employer]. [address] [place] [address], [place] [address]. All rights reserved. This information is not intended as a substitute for professional medical care. Always follow your healthcare professional's instructions.

## 2018-11-08 RX ORDER — AMLODIPINE BESYLATE 10 MG/1
10 TABLET ORAL DAILY
Qty: 90 TABLET | Refills: 3 | Status: SHIPPED | OUTPATIENT
Start: 2018-11-08 | End: 2019-11-18 | Stop reason: SDUPTHER

## 2018-11-28 ENCOUNTER — LAB VISIT (OUTPATIENT)
Dept: LAB | Facility: HOSPITAL | Age: 78
End: 2018-11-28
Attending: FAMILY MEDICINE
Payer: MEDICARE

## 2018-11-28 DIAGNOSIS — M35.3 POLYMYALGIA RHEUMATICA: ICD-10-CM

## 2018-11-28 LAB
ALBUMIN SERPL BCP-MCNC: 4 G/DL
ALP SERPL-CCNC: 52 U/L
ALT SERPL W/O P-5'-P-CCNC: 7 U/L
ANION GAP SERPL CALC-SCNC: 8 MMOL/L
AST SERPL-CCNC: 20 U/L
BASOPHILS # BLD AUTO: 0.02 K/UL
BASOPHILS NFR BLD: 0.4 %
BILIRUB SERPL-MCNC: 0.8 MG/DL
BUN SERPL-MCNC: 18 MG/DL
CALCIUM SERPL-MCNC: 9.7 MG/DL
CHLORIDE SERPL-SCNC: 105 MMOL/L
CO2 SERPL-SCNC: 26 MMOL/L
CREAT SERPL-MCNC: 1.2 MG/DL
CRP SERPL-MCNC: 1.2 MG/L
DIFFERENTIAL METHOD: ABNORMAL
EOSINOPHIL # BLD AUTO: 0.1 K/UL
EOSINOPHIL NFR BLD: 3.1 %
ERYTHROCYTE [DISTWIDTH] IN BLOOD BY AUTOMATED COUNT: 12.2 %
ERYTHROCYTE [SEDIMENTATION RATE] IN BLOOD BY WESTERGREN METHOD: 7 MM/HR
EST. GFR  (AFRICAN AMERICAN): >60 ML/MIN/1.73 M^2
EST. GFR  (NON AFRICAN AMERICAN): 57.6 ML/MIN/1.73 M^2
GLUCOSE SERPL-MCNC: 247 MG/DL
HCT VFR BLD AUTO: 42.1 %
HGB BLD-MCNC: 14.2 G/DL
IMM GRANULOCYTES # BLD AUTO: 0.01 K/UL
IMM GRANULOCYTES NFR BLD AUTO: 0.2 %
LYMPHOCYTES # BLD AUTO: 1.2 K/UL
LYMPHOCYTES NFR BLD: 25.8 %
MCH RBC QN AUTO: 33.3 PG
MCHC RBC AUTO-ENTMCNC: 33.7 G/DL
MCV RBC AUTO: 99 FL
MONOCYTES # BLD AUTO: 0.4 K/UL
MONOCYTES NFR BLD: 9.2 %
NEUTROPHILS # BLD AUTO: 2.8 K/UL
NEUTROPHILS NFR BLD: 61.3 %
NRBC BLD-RTO: 0 /100 WBC
PLATELET # BLD AUTO: 234 K/UL
PMV BLD AUTO: 11.9 FL
POTASSIUM SERPL-SCNC: 4.5 MMOL/L
PROT SERPL-MCNC: 7 G/DL
RBC # BLD AUTO: 4.26 M/UL
SODIUM SERPL-SCNC: 139 MMOL/L
WBC # BLD AUTO: 4.58 K/UL

## 2018-11-28 PROCEDURE — 85025 COMPLETE CBC W/AUTO DIFF WBC: CPT | Mod: HCWC

## 2018-11-28 PROCEDURE — 80053 COMPREHEN METABOLIC PANEL: CPT | Mod: HCWC

## 2018-11-28 PROCEDURE — 86140 C-REACTIVE PROTEIN: CPT | Mod: HCWC

## 2018-11-28 PROCEDURE — 85651 RBC SED RATE NONAUTOMATED: CPT | Mod: HCWC,PO

## 2018-11-28 PROCEDURE — 36415 COLL VENOUS BLD VENIPUNCTURE: CPT | Mod: HCWC,PO

## 2018-12-06 ENCOUNTER — OFFICE VISIT (OUTPATIENT)
Dept: RHEUMATOLOGY | Facility: CLINIC | Age: 78
End: 2018-12-06
Payer: MEDICARE

## 2018-12-06 VITALS
DIASTOLIC BLOOD PRESSURE: 67 MMHG | BODY MASS INDEX: 22.97 KG/M2 | HEART RATE: 74 BPM | WEIGHT: 184.75 LBS | HEIGHT: 75 IN | SYSTOLIC BLOOD PRESSURE: 143 MMHG

## 2018-12-06 DIAGNOSIS — N18.30 CHRONIC KIDNEY DISEASE, STAGE 3: ICD-10-CM

## 2018-12-06 DIAGNOSIS — G89.29 CHRONIC MIDLINE LOW BACK PAIN WITHOUT SCIATICA: ICD-10-CM

## 2018-12-06 DIAGNOSIS — M15.9 PRIMARY OSTEOARTHRITIS INVOLVING MULTIPLE JOINTS: ICD-10-CM

## 2018-12-06 DIAGNOSIS — M72.0 DUPUYTREN'S CONTRACTURE OF BOTH HANDS: ICD-10-CM

## 2018-12-06 DIAGNOSIS — M35.3 PMR (POLYMYALGIA RHEUMATICA): Primary | ICD-10-CM

## 2018-12-06 DIAGNOSIS — M54.50 CHRONIC MIDLINE LOW BACK PAIN WITHOUT SCIATICA: ICD-10-CM

## 2018-12-06 PROCEDURE — 99214 OFFICE O/P EST MOD 30 MIN: CPT | Mod: HCWC,S$GLB,, | Performed by: PHYSICIAN ASSISTANT

## 2018-12-06 PROCEDURE — 3077F SYST BP >= 140 MM HG: CPT | Mod: CPTII,HCWC,S$GLB, | Performed by: PHYSICIAN ASSISTANT

## 2018-12-06 PROCEDURE — 99999 PR PBB SHADOW E&M-EST. PATIENT-LVL III: CPT | Mod: PBBFAC,HCWC,, | Performed by: PHYSICIAN ASSISTANT

## 2018-12-06 PROCEDURE — 1101F PT FALLS ASSESS-DOCD LE1/YR: CPT | Mod: CPTII,HCWC,S$GLB, | Performed by: PHYSICIAN ASSISTANT

## 2018-12-06 PROCEDURE — 3078F DIAST BP <80 MM HG: CPT | Mod: CPTII,HCWC,S$GLB, | Performed by: PHYSICIAN ASSISTANT

## 2018-12-06 NOTE — PROGRESS NOTES
Subjective:       Patient ID: Jagdeep Greenberg is a 78 y.o. male.    Chief Complaint: pmr and Joint Pain    Jagdeep is here for follow up. Treated for PMR in the past. Weaned off prednisone  Over 2 years now. No need to add dmard therapy. Last year mild exacerbation  We added back prednisone 5 mg bid and he felt better within 2 days. Added turmeric  1000 mg bid. He has done better. Esr and crp normal. We have since weaned down and off prednisone. Off X 12 months. No recurrence of PMR symptoms.  He is taking glucosamine and fish oil as well.  Feeling good. No joint pain or stiffness. Today  pain level rated at 010.  Unable to take nonsteroidals because of chronic kidney disease.  In the past uses Tylenol when necessary for pain.      Dx with Parkinson's by neurology-  Now on sinemet tid, doing well just a little gait/imbalane issue.    dexa normal 2014- repeated today 6/6/18- normal       Osteoarthritis   Pertinent negatives include no abdominal pain, arthralgias, chest pain, chills, congestion, coughing, fatigue, fever, joint swelling, myalgias, nausea, neck pain, numbness, rash, vomiting or weakness.   Joint Pain   Pertinent negatives include no abdominal pain, arthralgias, chest pain, chills, congestion, coughing, fatigue, fever, joint swelling, myalgias, nausea, neck pain, numbness, rash, vomiting or weakness.       Review of Systems   Constitutional: Negative.  Negative for chills, fatigue and fever.   HENT: Negative.  Negative for congestion, mouth sores and trouble swallowing.    Eyes: Negative.  Negative for photophobia, redness and visual disturbance.   Respiratory: Negative.  Negative for cough, shortness of breath and wheezing.    Cardiovascular: Negative.  Negative for chest pain and leg swelling.   Gastrointestinal: Negative.  Negative for abdominal distention, abdominal pain, diarrhea, nausea and vomiting.   Genitourinary: Negative.  Negative for dysuria, flank pain, frequency and urgency.  "  Musculoskeletal: Positive for gait problem. Negative for arthralgias, back pain, joint swelling, myalgias, neck pain and neck stiffness.   Skin: Negative.  Negative for rash.   Neurological: Negative for dizziness, weakness and numbness.         Objective:   BP (!) 143/67   Pulse 74   Ht 6' 3" (1.905 m)   Wt 83.8 kg (184 lb 11.9 oz)   BMI 23.09 kg/m²      Physical Exam   Constitutional: He is oriented to person, place, and time and well-developed, well-nourished, and in no distress. No distress.   HENT:   Head: Normocephalic and atraumatic.   Right Ear: External ear normal.   Left Ear: External ear normal.   Mouth/Throat: No oropharyngeal exudate.   Eyes: Conjunctivae and EOM are normal. Pupils are equal, round, and reactive to light. No scleral icterus.   Neck: Neck supple. No thyromegaly present.   Cardiovascular: Normal rate, regular rhythm and normal heart sounds.    No murmur heard.  Pulmonary/Chest: Effort normal and breath sounds normal. No respiratory distress.   Abdominal: Soft. Bowel sounds are normal.       Right Side Rheumatological Exam     Examination finds the shoulder, elbow, wrist, knee, 1st PIP, 1st MCP, 2nd PIP, 2nd MCP, 3rd PIP, 3rd MCP, 4th PIP, 4th MCP, 5th PIP and 5th MCP normal.    Left Side Rheumatological Exam     Examination finds the shoulder, elbow, wrist, knee, 1st PIP, 1st MCP, 2nd PIP, 2nd MCP, 3rd PIP, 3rd MCP, 4th PIP, 4th MCP, 5th PIP and 5th MCP normal.      Lymphadenopathy:     He has no cervical adenopathy.   Neurological: He is alert and oriented to person, place, and time. No cranial nerve deficit. He exhibits normal muscle tone. Gait normal. Coordination normal.   Skin: Skin is warm and dry.     Musculoskeletal: Normal range of motion. He exhibits no edema or tenderness.   No triggering today  He does have early Dupuytren's contracture bilateral third fingers  No synovitis on exam                Recent Results (from the past 504 hour(s))   COMPREHENSIVE METABOLIC PANEL "    Collection Time: 11/28/18  8:23 AM   Result Value Ref Range    Sodium 139 136 - 145 mmol/L    Potassium 4.5 3.5 - 5.1 mmol/L    Chloride 105 95 - 110 mmol/L    CO2 26 23 - 29 mmol/L    Glucose 247 (H) 70 - 110 mg/dL    BUN, Bld 18 8 - 23 mg/dL    Creatinine 1.2 0.5 - 1.4 mg/dL    Calcium 9.7 8.7 - 10.5 mg/dL    Total Protein 7.0 6.0 - 8.4 g/dL    Albumin 4.0 3.5 - 5.2 g/dL    Total Bilirubin 0.8 0.1 - 1.0 mg/dL    Alkaline Phosphatase 52 (L) 55 - 135 U/L    AST 20 10 - 40 U/L    ALT 7 (L) 10 - 44 U/L    Anion Gap 8 8 - 16 mmol/L    eGFR if African American >60.0 >60 mL/min/1.73 m^2    eGFR if non  57.6 (A) >60 mL/min/1.73 m^2   CBC W/ AUTO DIFFERENTIAL    Collection Time: 11/28/18  8:23 AM   Result Value Ref Range    WBC 4.58 3.90 - 12.70 K/uL    RBC 4.26 (L) 4.60 - 6.20 M/uL    Hemoglobin 14.2 14.0 - 18.0 g/dL    Hematocrit 42.1 40.0 - 54.0 %    MCV 99 (H) 82 - 98 fL    MCH 33.3 (H) 27.0 - 31.0 pg    MCHC 33.7 32.0 - 36.0 g/dL    RDW 12.2 11.5 - 14.5 %    Platelets 234 150 - 350 K/uL    MPV 11.9 9.2 - 12.9 fL    Immature Granulocytes 0.2 0.0 - 0.5 %    Gran # (ANC) 2.8 1.8 - 7.7 K/uL    Immature Grans (Abs) 0.01 0.00 - 0.04 K/uL    Lymph # 1.2 1.0 - 4.8 K/uL    Mono # 0.4 0.3 - 1.0 K/uL    Eos # 0.1 0.0 - 0.5 K/uL    Baso # 0.02 0.00 - 0.20 K/uL    nRBC 0 0 /100 WBC    Gran% 61.3 38.0 - 73.0 %    Lymph% 25.8 18.0 - 48.0 %    Mono% 9.2 4.0 - 15.0 %    Eosinophil% 3.1 0.0 - 8.0 %    Basophil% 0.4 0.0 - 1.9 %    Differential Method Automated    SEDIMENTATION RATE, MANUAL    Collection Time: 11/28/18  8:23 AM   Result Value Ref Range    Sed Rate 7 0 - 10 mm/Hr   C-REACTIVE PROTEIN    Collection Time: 11/28/18  8:23 AM   Result Value Ref Range    CRP 1.2 0.0 - 8.2 mg/L         No results found for this or any previous visit (from the past 168 hour(s)).           Assessment:       1. PMR (polymyalgia rheumatica)    2. Primary osteoarthritis involving multiple joints    3. Chronic kidney disease, stage  3    4. Chronic midline low back pain without sciatica    5. Dupuytren's contracture of both hands          1. PMR-  Now off prednisone-X 12 monhts- condition now in remission Esr 7-no signs of activity     2. Trigger finger will middle fingers-resolved post tendon sheath injection    3. CKD and DM  not able to take nsaids- renal function looks good eGFR 53    4. OA generalized    5. Dupuytren's contracture- early and mild will middle flexor tendon    6. Long term steroid use - normal dexa 2014- repeat dexa 6/6/18- normal with stable bmd compared back to 2014    7. Newly dx parkinson mild gain/imbalance issues on sinement- seeing neurology Dr Harden     Plan:         Remain off prednisone, no need to add back    Repeat dexa 3-4 yrs    Topical voltaren gel qid prn     Continue to Try tummeric 1500 mg  Continue glucosamine and fish oil  Tylenol for pain , avoid nonsteroidals    At home exercise for dupuytren contracture, paraffin and range of motion exercises  Consider PT for gait/balance training/fall prevention  F/w with neurology as scheduled    rtc 6 weeks  with CBC, CMP, sedimentation rate and CRP

## 2019-01-01 RX ORDER — GLIMEPIRIDE 2 MG/1
TABLET ORAL
Qty: 90 TABLET | Refills: 1 | Status: SHIPPED | OUTPATIENT
Start: 2019-01-01 | End: 2019-07-09 | Stop reason: SDUPTHER

## 2019-02-06 ENCOUNTER — TELEPHONE (OUTPATIENT)
Dept: NEUROLOGY | Facility: CLINIC | Age: 79
End: 2019-02-06

## 2019-02-06 ENCOUNTER — PATIENT OUTREACH (OUTPATIENT)
Dept: ADMINISTRATIVE | Facility: HOSPITAL | Age: 79
End: 2019-02-06

## 2019-02-06 NOTE — TELEPHONE ENCOUNTER
The patient's wife called in the make a appointment. The patient is now getting worse with his mobility issues

## 2019-02-06 NOTE — TELEPHONE ENCOUNTER
----- Message from Deanne Mandujano sent at 2/6/2019 12:17 PM CST -----  Contact: Mrs. Greenberg (wife)/ [phone]  Wife asked if patient can be seen tomorrow. She is concerned his health is decreasing.    Please call.

## 2019-02-06 NOTE — LETTER
February 12, 2019        Jagdeep Greenberg  [address]  [place] [address]      Dear Mr. Greenberg,    You have an upcoming appointment with Panda Contreras MD on 2/20/19 @ 2:20 PM.      Your chart is indicating you may be due for the following and I will be happy to assist you in scheduling any needed appointments:  Health Maintenance Due   Topic    TETANUS VACCINE     Eye Exam     Foot Exam     Hemoglobin A1c      If you have had any of the above done at another facility, please bring the records or information with you so that your record at [facility] will be complete.    We will be happy to assist you with scheduling any necessary appointments or you may contact the [facility] appointment desk at [phone] to schedule at your convenience.   This information was sent to you via your patient portal.  Please check your message portal for important information.      Thank you for choosing [facility] for your healthcare needs,      MARCELLUS Mcduffie  Care Coordination Department  [facility]  [phone]

## 2019-02-07 ENCOUNTER — OFFICE VISIT (OUTPATIENT)
Dept: NEUROLOGY | Facility: CLINIC | Age: 79
End: 2019-02-07
Payer: MEDICARE

## 2019-02-07 VITALS — BODY MASS INDEX: 22.97 KG/M2 | WEIGHT: 184.75 LBS | HEIGHT: 75 IN

## 2019-02-07 DIAGNOSIS — G31.84 MCI (MILD COGNITIVE IMPAIRMENT): ICD-10-CM

## 2019-02-07 DIAGNOSIS — G20.C PARKINSONISM, UNSPECIFIED PARKINSONISM TYPE: Primary | ICD-10-CM

## 2019-02-07 PROCEDURE — 1101F PR PT FALLS ASSESS DOC 0-1 FALLS W/OUT INJ PAST YR: ICD-10-PCS | Mod: HCNC,CPTII,S$GLB, | Performed by: PSYCHIATRY & NEUROLOGY

## 2019-02-07 PROCEDURE — 99999 PR PBB SHADOW E&M-EST. PATIENT-LVL IV: CPT | Mod: PBBFAC,HCNC,, | Performed by: PSYCHIATRY & NEUROLOGY

## 2019-02-07 PROCEDURE — 99214 OFFICE O/P EST MOD 30 MIN: CPT | Mod: HCNC,S$GLB,, | Performed by: PSYCHIATRY & NEUROLOGY

## 2019-02-07 PROCEDURE — 1101F PT FALLS ASSESS-DOCD LE1/YR: CPT | Mod: HCNC,CPTII,S$GLB, | Performed by: PSYCHIATRY & NEUROLOGY

## 2019-02-07 PROCEDURE — 99214 PR OFFICE/OUTPT VISIT, EST, LEVL IV, 30-39 MIN: ICD-10-PCS | Mod: HCNC,S$GLB,, | Performed by: PSYCHIATRY & NEUROLOGY

## 2019-02-07 PROCEDURE — 99999 PR PBB SHADOW E&M-EST. PATIENT-LVL IV: ICD-10-PCS | Mod: PBBFAC,HCNC,, | Performed by: PSYCHIATRY & NEUROLOGY

## 2019-02-07 RX ORDER — DONEPEZIL HYDROCHLORIDE 5 MG/1
5 TABLET, FILM COATED ORAL NIGHTLY
Qty: 90 TABLET | Refills: 3 | Status: SHIPPED | OUTPATIENT
Start: 2019-02-07 | End: 2019-02-07 | Stop reason: SDUPTHER

## 2019-02-07 RX ORDER — DONEPEZIL HYDROCHLORIDE 5 MG/1
5 TABLET, FILM COATED ORAL NIGHTLY
Qty: 90 TABLET | Refills: 3 | Status: SHIPPED | OUTPATIENT
Start: 2019-02-07 | End: 2020-05-25

## 2019-02-07 NOTE — PROGRESS NOTES
[facility] - NEUROLOGY EPILEPSY  [facility] [place]    Date: February 7, 2019   Patient Name: Jagdeep Greenberg   MRN: [id number]   PCP: Panda Contreras  Referring Provider: No ref. provider found    Assessmentn and Plan:   Jagdeep Greenberg is a 78 y.o. male presenting for follow-up MCI and mild parkinsonism.  Patient has had symptomatic response to low-dose dopaminergic therapy.  At present, appears to have overall functional decline.  Favor involvement of PT and ST with LSVT BIG and LOUD theapies.  Will initiate trial of Aricept for management of cognitive symptoms as well.    Problem List Items Addressed This Visit        Neuro    Parkinsonism - Primary    Relevant Orders    Ambulatory consult to Physical Therapy    Ambulatory Referral to Speech Therapy    MCI (mild cognitive impairment)    Relevant Orders    Ambulatory Referral to Speech Therapy        Ernie Harden MD  [facility]   Department of Neurology    Patient note was created using Dragon Dictation.  Any errors in syntax or even information may not have been identified and edited on initial review prior to signing this note.  Subjective:   HPI:   Mr. Jagdeep Greenberg is a 78 y.o. male presenting in follow-up for parkinsonism, lacunar stroke. and MCI.  He presents today with his wife who contributes to the history.  Together they report that they have noticed a mild-to-moderate deterioration in his overall mobility.  His wife feels that he is moving more slowly and is slightly more unstable in his gait.  She has also noted a mild deterioration in his short-term memory.  His wife states that he has had 1 episode of becoming confused while driving and since that time he has stopped driving.  They deny any tremor or, falls, or freezing.  They deny any new focal neurologic deficits. He is now amenable to a trial of LSVT BIG and LOUD, which he has previously declined.    PAST MEDICAL HISTORY:  Past Medical  History:   Diagnosis Date    Adenomatous polyp of colon     Arthritis     Back pain     Carotid artery plaque     Less than 50%    Chronic kidney disease, stage 3     Diabetes mellitus, type 2     Diabetes with neurologic complications     Erectile dysfunction following radical prostatectomy     Fractures     Hyperlipidemia LDL goal < 100     Hypertension goal BP (blood pressure) < 130/80     Lacunar stroke 8/17/2017    MCI (mild cognitive impairment)     Parkinson disease     Parkinsonism 4/11/2017    Prostate cancer 2001    Renal manifestation of secondary diabetes mellitus     Stroke      PAST SURGICAL HISTORY:  Past Surgical History:   Procedure Laterality Date    APPENDECTOMY      COLONOSCOPY  In 3/2/2010    Repeat 5 years    COLONOSCOPY N/A 8/23/2018    [facility]formed [facility]O (4TH FLR)    COLONOSCOPY N/A 10/24/2017    [facility]    COLONOSCOPY N/A 10/11/2016    [facility] at [facility]    COLONOSCOPY N/A 9/29/2015    Performed [facility] at [facility] END[facility]    COLONOSCOPY - OPERATIVE N/A 1/9/2018    Per[facility] at [facility] OR 2ND FLR    FRACTURE SURGERY      HERNIA REPAIR      LYMPH NODE DISSECTION      Pelvic    POLYPECTOMY; endoscopic mucosal resection (EMR) N/A 1/9/2018    Performed by [facility] at [facility] OR 2ND FLR    PROSTATECTOMY      SHOULDER ARTHROSCOPY      Right     CURRENT MEDS:  Current Outpatient Medications   Medication Sig Dispense Refill    amLODIPine (NORVASC) 10 MG tablet Take 1 tablet (10 mg total) by mouth once daily. 90 tablet 3    aspirin (ECOTRIN) 81 MG EC tablet Take 1 tablet (81 mg total) by mouth once daily.  0    benazepril-hydrochlorthiazide (LOTENSIN HCT) 20-12.5 mg per tablet TAKE 1 TABLET BY MOUTH EVERY DAY 90 tablet 3    blood sugar diagnostic (GLUCOSE BLOOD) Strp Test glucose 1 x daily 35 strip prn    carbidopa-levodopa   "mg (SINEMET)  mg per tablet Take 0.5 tablets by mouth 3 (three) times daily. 135 tablet 3    fish oil-omega-3 fatty acids 300-1,000 mg capsule Take 2 g by mouth once daily.      FLUZONE HIGH-DOSE 2018-19, PF, 180 mcg/0.5 mL vaccine       glimepiride (AMARYL) 2 MG tablet TAKE 1 TABLET(2 MG) BY MOUTH DAILY WITH BREAKFAST 90 tablet 1    GLUC.METER,DIS.P-LOADED STRIPS (GLUCOSE METER, DISP & STRIPS) Kit Check glucose once per day 1 kit prn    GLUCOSAMINE HCL (GLUCOSAMINE, BULK, MISC) by Misc.(Non-Drug; Combo Route) route.      L. RHAMNOSUS GG/INULIN (CULTURELLE PROBIOTICS ORAL) Take by mouth.      lancets Misc As directed 100 each prn    MEN'S MULTI-VITAMIN ORAL Take by mouth.      metFORMIN (GLUCOPHAGE) 1000 MG tablet TAKE 1 TABLET BY MOUTH DAILY WITH BREAKFAST 90 tablet 3    pravastatin (PRAVACHOL) 20 MG tablet TAKE 1 TABLET BY MOUTH DAILY 90 tablet 3    turmeric root extract 500 mg Cap Take by mouth.      VOLTAREN 1 % Gel CRISTIAN 2 GRAMS EXT AA QID  3    donepezil (ARICEPT) 5 MG tablet Take 1 tablet (5 mg total) by mouth every evening. 90 tablet 3     No current facility-administered medications for this visit.      ALLERGIES:  Review of patient's allergies indicates:  No Known Allergies    FAMILY HISTORY:  Family History   Problem Relation Age of Onset    Leukemia Father 68    Anesthesia problems Neg Hx      SOCIAL HISTORY:  Social History     Tobacco Use    Smoking status: Never Smoker    Smokeless tobacco: Never Used   Substance Use Topics    Alcohol use: Yes     Alcohol/week: 21.0 oz     Types: 14 Cans of beer, 21 Shots of liquor per week     Comment: 3 drinks a day    Drug use: No     Review of Systems:  12 review of systems is negative except for the symptoms mentioned in HPI.      Objective:     Vitals:    02/07/19 1438   Weight: 83.8 kg (184 lb 11.9 oz)   Height: 6' 3" (1.905 m)     General: NAD, well nourished   Eyes: no tearing, discharge, no erythema   ENT: moist mucous membranes of the " oral cavity, nares patent    Neck: Supple, full range of motion  Cardiovascular: Warm and well perfused, pulses equal and symmetrical  Lungs: Normal work of breathing, normal chest wall excursions  Skin: No rash, lesions, or breakdown on exposed skin  Psychiatry: Mood and affect are appropriate   Abdomen: soft, non tender, non distended  Extremeties: No cyanosis, clubbing or edema.    Neurological   MENTAL STATUS: Alert and oriented to person, place, and time. Attention and concentration within normal limits. Speech without dysarthria, able to name and repeat with moderate Recent and remote memory fair to poor   CRANIAL NERVES: Visual fields intact. PERRL. EOMI with slow saccades. Facial sensation intact. Face symmetrical with hypomimia. Hearing grossly intact. Full shoulder shrug bilaterally. Tongue protrudes midline   SENSORY: Sensation is intact to light touch throughout.    MOTOR: Normal bulk and mildly increased tone in RUE>LUE. No resting tremor noted.5/5 deltoid, biceps, triceps, interosseous, hand  bilaterally. 5/5 iliopsoas, knee extension/flexion, foot dorsi/plantarflexion bilaterally.    REFLEXES: Symmetric and  1 throughout.   CEREBELLAR/COORDINATION/GAIT: Gait with normal stride and slightly reduced arm swing R>L. Negative pull back test. Finger to nose intact. Normal rapid alternating movements.     MOCA Results 17:   Visuospatial/Executive: 3/5  Namin/3  Attention:   Language: 13  Abstraction: 12  Delayed Recall: 05  Orientation:   Total:

## 2019-02-18 ENCOUNTER — LAB VISIT (OUTPATIENT)
Dept: LAB | Facility: HOSPITAL | Age: 79
End: 2019-02-18
Attending: FAMILY MEDICINE
Payer: MEDICARE

## 2019-02-18 DIAGNOSIS — M35.3 PMR (POLYMYALGIA RHEUMATICA): ICD-10-CM

## 2019-02-18 DIAGNOSIS — Z79.52 LONG TERM CURRENT USE OF SYSTEMIC STEROIDS: ICD-10-CM

## 2019-02-18 DIAGNOSIS — E11.9 TYPE 2 DIABETES MELLITUS WITHOUT COMPLICATION, WITHOUT LONG-TERM CURRENT USE OF INSULIN: ICD-10-CM

## 2019-02-18 DIAGNOSIS — E78.5 HYPERLIPIDEMIA, UNSPECIFIED HYPERLIPIDEMIA TYPE: ICD-10-CM

## 2019-02-18 DIAGNOSIS — N18.30 CHRONIC KIDNEY DISEASE, STAGE 3: ICD-10-CM

## 2019-02-18 LAB
ALBUMIN SERPL BCP-MCNC: 4.2 G/DL
ALP SERPL-CCNC: 59 U/L
ALT SERPL W/O P-5'-P-CCNC: 18 U/L
ANION GAP SERPL CALC-SCNC: 8 MMOL/L
AST SERPL-CCNC: 25 U/L
BILIRUB SERPL-MCNC: 0.8 MG/DL
BUN SERPL-MCNC: 20 MG/DL
CALCIUM SERPL-MCNC: 10 MG/DL
CHLORIDE SERPL-SCNC: 106 MMOL/L
CHOLEST SERPL-MCNC: 157 MG/DL
CHOLEST/HDLC SERPL: 4 {RATIO}
CO2 SERPL-SCNC: 27 MMOL/L
CREAT SERPL-MCNC: 1 MG/DL
EST. GFR  (AFRICAN AMERICAN): >60 ML/MIN/1.73 M^2
EST. GFR  (NON AFRICAN AMERICAN): >60 ML/MIN/1.73 M^2
ESTIMATED AVG GLUCOSE: 174 MG/DL
GLUCOSE SERPL-MCNC: 187 MG/DL
HBA1C MFR BLD HPLC: 7.7 %
HDLC SERPL-MCNC: 39 MG/DL
HDLC SERPL: 24.8 %
LDLC SERPL CALC-MCNC: 85.8 MG/DL
NONHDLC SERPL-MCNC: 118 MG/DL
POTASSIUM SERPL-SCNC: 4.2 MMOL/L
PROT SERPL-MCNC: 7.1 G/DL
SODIUM SERPL-SCNC: 141 MMOL/L
TRIGL SERPL-MCNC: 161 MG/DL

## 2019-02-18 PROCEDURE — 80053 COMPREHEN METABOLIC PANEL: CPT | Mod: HCNC

## 2019-02-18 PROCEDURE — 80061 LIPID PANEL: CPT | Mod: HCNC

## 2019-02-18 PROCEDURE — 83036 HEMOGLOBIN GLYCOSYLATED A1C: CPT | Mod: HCNC

## 2019-02-18 PROCEDURE — 36415 COLL VENOUS BLD VENIPUNCTURE: CPT | Mod: HCNC,PO

## 2019-02-20 ENCOUNTER — OFFICE VISIT (OUTPATIENT)
Dept: FAMILY MEDICINE | Facility: CLINIC | Age: 79
End: 2019-02-20
Payer: MEDICARE

## 2019-02-20 VITALS
BODY MASS INDEX: 22.63 KG/M2 | SYSTOLIC BLOOD PRESSURE: 138 MMHG | HEART RATE: 67 BPM | TEMPERATURE: 98 F | HEIGHT: 75 IN | DIASTOLIC BLOOD PRESSURE: 70 MMHG | WEIGHT: 182 LBS

## 2019-02-20 DIAGNOSIS — E11.69 COMBINED HYPERLIPIDEMIA ASSOCIATED WITH TYPE 2 DIABETES MELLITUS: ICD-10-CM

## 2019-02-20 DIAGNOSIS — Z00.00 ROUTINE HISTORY AND PHYSICAL EXAMINATION OF ADULT: Primary | ICD-10-CM

## 2019-02-20 DIAGNOSIS — N18.30 TYPE 2 DIABETES MELLITUS WITH STAGE 3 CHRONIC KIDNEY DISEASE, WITHOUT LONG-TERM CURRENT USE OF INSULIN: ICD-10-CM

## 2019-02-20 DIAGNOSIS — E11.59 HYPERTENSION ASSOCIATED WITH DIABETES: ICD-10-CM

## 2019-02-20 DIAGNOSIS — G20.C PARKINSONISM, UNSPECIFIED PARKINSONISM TYPE: ICD-10-CM

## 2019-02-20 DIAGNOSIS — I15.2 HYPERTENSION ASSOCIATED WITH DIABETES: ICD-10-CM

## 2019-02-20 DIAGNOSIS — E78.2 COMBINED HYPERLIPIDEMIA ASSOCIATED WITH TYPE 2 DIABETES MELLITUS: ICD-10-CM

## 2019-02-20 DIAGNOSIS — E11.40 TYPE 2 DIABETES MELLITUS WITH DIABETIC NEUROPATHY, WITHOUT LONG-TERM CURRENT USE OF INSULIN: ICD-10-CM

## 2019-02-20 DIAGNOSIS — E11.22 TYPE 2 DIABETES MELLITUS WITH STAGE 3 CHRONIC KIDNEY DISEASE, WITHOUT LONG-TERM CURRENT USE OF INSULIN: ICD-10-CM

## 2019-02-20 PROBLEM — Z12.11 SCREENING FOR COLON CANCER: Status: RESOLVED | Noted: 2018-08-23 | Resolved: 2019-02-20

## 2019-02-20 PROCEDURE — 3078F DIAST BP <80 MM HG: CPT | Mod: HCNC,CPTII,S$GLB, | Performed by: FAMILY MEDICINE

## 2019-02-20 PROCEDURE — 99999 PR PBB SHADOW E&M-EST. PATIENT-LVL IV: CPT | Mod: PBBFAC,HCNC,, | Performed by: FAMILY MEDICINE

## 2019-02-20 PROCEDURE — 3078F PR MOST RECENT DIASTOLIC BLOOD PRESSURE < 80 MM HG: ICD-10-PCS | Mod: HCNC,CPTII,S$GLB, | Performed by: FAMILY MEDICINE

## 2019-02-20 PROCEDURE — 99499 UNLISTED E&M SERVICE: CPT | Mod: HCNC,S$GLB,, | Performed by: FAMILY MEDICINE

## 2019-02-20 PROCEDURE — 99397 PER PM REEVAL EST PAT 65+ YR: CPT | Mod: HCNC,S$GLB,, | Performed by: FAMILY MEDICINE

## 2019-02-20 PROCEDURE — 99499 RISK ADDL DX/OHS AUDIT: ICD-10-PCS | Mod: HCNC,S$GLB,, | Performed by: FAMILY MEDICINE

## 2019-02-20 PROCEDURE — 3075F PR MOST RECENT SYSTOLIC BLOOD PRESS GE 130-139MM HG: ICD-10-PCS | Mod: HCNC,CPTII,S$GLB, | Performed by: FAMILY MEDICINE

## 2019-02-20 PROCEDURE — 3075F SYST BP GE 130 - 139MM HG: CPT | Mod: HCNC,CPTII,S$GLB, | Performed by: FAMILY MEDICINE

## 2019-02-20 PROCEDURE — 99999 PR PBB SHADOW E&M-EST. PATIENT-LVL IV: ICD-10-PCS | Mod: PBBFAC,HCNC,, | Performed by: FAMILY MEDICINE

## 2019-02-20 PROCEDURE — 99397 PR PREVENTIVE VISIT,EST,65 & OVER: ICD-10-PCS | Mod: HCNC,S$GLB,, | Performed by: FAMILY MEDICINE

## 2019-02-20 RX ORDER — PRAVASTATIN SODIUM 20 MG/1
20 TABLET ORAL DAILY
Qty: 90 TABLET | Refills: 3 | Status: SHIPPED | OUTPATIENT
Start: 2019-02-20 | End: 2020-04-12

## 2019-02-20 RX ORDER — METFORMIN HYDROCHLORIDE 1000 MG/1
1000 TABLET ORAL DAILY
Qty: 90 TABLET | Refills: 3 | Status: SHIPPED | OUTPATIENT
Start: 2019-02-20 | End: 2020-02-25

## 2019-02-20 RX ORDER — BENAZEPRIL HYDROCHLORIDE AND HYDROCHLOROTHIAZIDE 20; 12.5 MG/1; MG/1
1 TABLET ORAL DAILY
Qty: 90 TABLET | Refills: 3 | Status: SHIPPED | OUTPATIENT
Start: 2019-02-20 | End: 2019-05-08 | Stop reason: CLARIF

## 2019-02-21 NOTE — PROGRESS NOTES
Physical exam.  Hypertension stable.  Diabetes sugars increasing.  Some dietary indiscretion.  Parkinson's followed by Neurology slowly worsening.  He is in physical therapy due to some mild gait disturbance.  Did feel uncomfortable driving recently and is not driving currently.  States has decreased alcohol consumption 1 beer with 2 high balls every other day  Diabetes Management Status    Statin: Taking  ACE/ARB: Taking    Screening or Prevention Patient's value Goal Complete/Controlled?   HgA1C Testing and Control   Lab Results   Component Value Date    HGBA1C 7.7 (H) 02/18/2019      Annually/Less than 8% Yes   Lipid profile : 02/18/2019 Annually Yes   LDL control Lab Results   Component Value Date    LDLCALC 85.8 02/18/2019    Annually/Less than 100 mg/dl  Yes   Nephropathy screening Lab Results   Component Value Date    LABMICR 17.0 02/18/2019     No results found for: PROTEINUA Annually Yes   Blood pressure BP Readings from Last 1 Encounters:   02/20/19 138/70    Less than 140/90 Yes   Dilated retinal exam : 04/05/2017 Annually No   Foot exam   : 02/20/2019 Annually Yes     Past Medical History:  Past Medical History:   Diagnosis Date    Adenomatous polyp of colon     Arthritis     Back pain     Carotid artery plaque     Less than 50%    Chronic kidney disease, stage 3     Diabetes mellitus, type 2     Diabetes with neurologic complications     Erectile dysfunction following radical prostatectomy     Fractures     Hyperlipidemia LDL goal < 100     Hypertension goal BP (blood pressure) < 130/80     Lacunar stroke 8/17/2017    MCI (mild cognitive impairment)     Parkinson disease     Parkinsonism 4/11/2017    Prostate cancer 2001    Renal manifestation of secondary diabetes mellitus     Stroke      Past Surgical History:   Procedure Laterality Date    APPENDECTOMY      COLONOSCOPY  In 3/2/2010    Repeat 5 years    COLONOSCOPY N/A 8/23/2018    Performed by [facility] at [facility] ENDO  (4TH FLR)    COLONOSCOPY N/A 10/24/2017    [facility]    COLONOSCOPY N/A 10/11/2016    [facility] ENDO    COLONOSCOPY N/A 9/29/2015    [facility] ENDO    COLONOSCOPY - OPERATIVE N/A 1/9/2018    [facility] OR 2ND FLR    FRACTURE SURGERY      HERNIA REPAIR      LYMPH NODE DISSECTION      Pelvic    POLYPECTOMY; endoscopic mucosal resection (EMR) N/A 1/9/2018    [facility] OR 2ND FLR    PROSTATECTOMY      SHOULDER ARTHROSCOPY      Right     Social History     Socioeconomic History    Marital status:      Spouse name: Joaquin    Number of children: 0    Years of education: Not on file    Highest education level: Not on file   Social Needs    Financial resource strain: Not on file    Food insecurity - worry: Not on file    Food insecurity - inability: Not on file    Transportation needs - medical: Not on file    Transportation needs - non-medical: Not on file   Occupational History    Occupation: Retired   Tobacco Use    Smoking status: Never Smoker    Smokeless tobacco: Never Used   Substance and Sexual Activity    Alcohol use: Yes     Alcohol/week: 21.0 oz     Types: 14 Cans of beer, 21 Shots of liquor per week     Comment: 3 drinks a day    Drug use: No    Sexual activity: Not on file   Other Topics Concern    Not on file   Social History Narrative    Not on file     Family History   Problem Relation Age of Onset    Leukemia Father 68    Anesthesia problems Neg Hx      Review of patient's allergies indicates:  No Known Allergies  Current Outpatient Medications on File Prior to Visit   Medication Sig Dispense Refill    amLODIPine (NORVASC) 10 MG tablet Take 1 tablet (10 mg total) by mouth once daily. 90 tablet 3    aspirin (ECOTRIN) 81 MG EC tablet Take 1 tablet (81 mg total) by mouth once daily.  0    blood sugar diagnostic  (GLUCOSE BLOOD) Strp Test glucose 1 x daily 35 strip prn    carbidopa-levodopa  mg (SINEMET)  mg per tablet Take 0.5 tablets by mouth 3 (three) times daily. 135 tablet 3    donepezil (ARICEPT) 5 MG tablet Take 1 tablet (5 mg total) by mouth every evening. 90 tablet 3    fish oil-omega-3 fatty acids 300-1,000 mg capsule Take 2 g by mouth once daily.      glimepiride (AMARYL) 2 MG tablet TAKE 1 TABLET(2 MG) BY MOUTH DAILY WITH BREAKFAST 90 tablet 1    GLUC.METER,DIS.P-LOADED STRIPS (GLUCOSE METER, DISP & STRIPS) Kit Check glucose once per day 1 kit prn    GLUCOSAMINE HCL (GLUCOSAMINE, BULK, MISC) by Misc.(Non-Drug; Combo Route) route.      L. RHAMNOSUS GG/INULIN (CULTURELLE PROBIOTICS ORAL) Take by mouth.      lancets Misc As directed 100 each prn    MEN'S MULTI-VITAMIN ORAL Take by mouth.      turmeric root extract 500 mg Cap Take by mouth.      VOLTAREN 1 % Gel CRISTIAN 2 GRAMS EXT AA QID  3    [DISCONTINUED] benazepril-hydrochlorthiazide (LOTENSIN HCT) 20-12.5 mg per tablet TAKE 1 TABLET BY MOUTH EVERY DAY 90 tablet 3    [DISCONTINUED] metFORMIN (GLUCOPHAGE) 1000 MG tablet TAKE 1 TABLET BY MOUTH DAILY WITH BREAKFAST 90 tablet 3    [DISCONTINUED] pravastatin (PRAVACHOL) 20 MG tablet TAKE 1 TABLET BY MOUTH DAILY 90 tablet 3    [DISCONTINUED] FLUZONE HIGH-DOSE 2018-19, PF, 180 mcg/0.5 mL vaccine        No current facility-administered medications on file prior to visit.              ROS:  GENERAL: No fever, chills,  or significant weight changes.  HEENT: No headache or hearing complaints.  No dysphagia  Eyes: No vision complaints  CHEST: Denies ANDRE, cyanosis, wheezing, cough and sputum production.  CARDIOVASCULAR: Denies chest pain, PND, orthopnea.  ABDOMEN: Appetite fine. Denies diarrhea, abdominal pain, hematemesis or blood in stool.  URINARY: No flank pain, dysuria or hematuria.  MUSCULOSKELETAL: No acute complaints  NEUROLOGIC: No focal weakness.  PSYCHIATRIC: Denies complains    OBJECTIVE:  "  Vitals:    02/20/19 1431 02/20/19 1501   BP: (!) 149/70 138/70   Pulse: 67    Temp: 98.1 °F (36.7 °C)    Weight: 82.6 kg (182 lb)    Height: 6' 3" (1.905 m)      Wt Readings from Last 3 Encounters:   02/20/19 82.6 kg (182 lb)   02/07/19 83.8 kg (184 lb 11.9 oz)   12/06/18 83.8 kg (184 lb 11.9 oz)       APPEARANCE: Well nourished, well developed, in no acute distress.   HEAD: Normocephalic. Atraumatic. No sinus tenderness.   EYES:   Right eye: Pupil reactive. Conjunctiva clear.   Left eye: Pupil reactive. Conjunctiva clear.   Both fundi: Grossly normal to nondilated exam. EOMI.   EARS: TM's intact. Light reflex normal. No retraction or perforation.   NOSE: clear.   MOUTH & THROAT: No pharyngeal erythema or exudate. No lesions.   NECK: No bruits. No JVD. No cervical lymphadenopathy. No thyromegaly.   CHEST: Breath sounds clear bilaterally. Normal respiratory effort   CARDIOVASCULAR: Normal rate. Regular rhythm. No murmurs. No rub. No gallops.   ABDOMEN: Bowel sounds normal. Soft. No tenderness. No organomegaly.   PERIPHERAL VASCULAR: No cyanosis. No clubbing. No edema.   NEUROLOGIC: No focal weakness  Feet:Sensation in the feet mildly diminished to monofilament testing.   Hammer toes, otherwise No significant foot lesions.Pulses palpable.  MENTAL STATUS: Alert. Oriented x 3.           Jagdeep was seen today for annual exam and medication refill.    Diagnoses and all orders for this visit:    Routine history and physical examination of adult    Hypertension associated with diabetes  -     Ambulatory referral to Ophthalmology    Combined hyperlipidemia associated with type 2 diabetes mellitus    Type 2 diabetes mellitus with stage 3 chronic kidney disease, without long-term current use of insulin    Type 2 diabetes mellitus with diabetic neuropathy, without long-term current use of insulin    Parkinsonism, unspecified Parkinsonism type    Other orders  -     benazepril-hydrochlorthiazide (LOTENSIN HCT) 20-12.5 mg per " tablet; Take 1 tablet by mouth once daily.  -     metFORMIN (GLUCOPHAGE) 1000 MG tablet; Take 1 tablet (1,000 mg total) by mouth once daily.  -     pravastatin (PRAVACHOL) 20 MG tablet; Take 1 tablet (20 mg total) by mouth once daily.     Continue current medication.  Watch diet.  Specifically want to avoid hypoglycemia so we will defer on increasing glimepiride currently.  Recheck hemoglobin A1c 3 months.  Anticipatory guidance: Don't smoke.  Healthy diet and regular exercise recommended.  Recommend avoid alcohol altogether or certainly no more than 1 drink

## 2019-02-28 ENCOUNTER — TELEPHONE (OUTPATIENT)
Dept: RHEUMATOLOGY | Facility: CLINIC | Age: 79
End: 2019-02-28

## 2019-02-28 NOTE — TELEPHONE ENCOUNTER
Left message for patient to call. Ms Hurst will be out on June 6. She is booking in on June 7 and would like to move his appointment to 10:00 am on 6-7 if that is okay.

## 2019-05-01 ENCOUNTER — PATIENT MESSAGE (OUTPATIENT)
Dept: RHEUMATOLOGY | Facility: CLINIC | Age: 79
End: 2019-05-01

## 2019-05-07 ENCOUNTER — OFFICE VISIT (OUTPATIENT)
Dept: RHEUMATOLOGY | Facility: CLINIC | Age: 79
End: 2019-05-07
Payer: MEDICARE

## 2019-05-07 ENCOUNTER — HOSPITAL ENCOUNTER (OUTPATIENT)
Dept: RADIOLOGY | Facility: HOSPITAL | Age: 79
Discharge: HOME | End: 2019-05-07
Attending: PHYSICIAN ASSISTANT
Payer: MEDICARE

## 2019-05-07 VITALS
HEIGHT: 75 IN | HEART RATE: 74 BPM | SYSTOLIC BLOOD PRESSURE: 147 MMHG | BODY MASS INDEX: 21.93 KG/M2 | WEIGHT: 176.38 LBS | DIASTOLIC BLOOD PRESSURE: 68 MMHG

## 2019-05-07 DIAGNOSIS — M54.50 LOW BACK PAIN, NON-SPECIFIC: ICD-10-CM

## 2019-05-07 DIAGNOSIS — G20.C PARKINSONISM, UNSPECIFIED PARKINSONISM TYPE: ICD-10-CM

## 2019-05-07 DIAGNOSIS — R26.9 GAIT ABNORMALITY: ICD-10-CM

## 2019-05-07 DIAGNOSIS — M54.41 CHRONIC MIDLINE LOW BACK PAIN WITH BILATERAL SCIATICA: ICD-10-CM

## 2019-05-07 DIAGNOSIS — G89.29 CHRONIC MIDLINE LOW BACK PAIN WITH BILATERAL SCIATICA: ICD-10-CM

## 2019-05-07 DIAGNOSIS — M79.18 CHRONIC GLUTEAL PAIN: ICD-10-CM

## 2019-05-07 DIAGNOSIS — M15.9 PRIMARY OSTEOARTHRITIS INVOLVING MULTIPLE JOINTS: Primary | ICD-10-CM

## 2019-05-07 DIAGNOSIS — G89.29 CHRONIC GLUTEAL PAIN: ICD-10-CM

## 2019-05-07 DIAGNOSIS — M54.42 CHRONIC MIDLINE LOW BACK PAIN WITH BILATERAL SCIATICA: ICD-10-CM

## 2019-05-07 PROCEDURE — 99214 OFFICE O/P EST MOD 30 MIN: CPT | Mod: HCNC,S$GLB,, | Performed by: PHYSICIAN ASSISTANT

## 2019-05-07 PROCEDURE — 72100 XR LUMBAR SPINE AP AND LATERAL: ICD-10-PCS | Mod: 26,HCNC,, | Performed by: RADIOLOGY

## 2019-05-07 PROCEDURE — 72100 X-RAY EXAM L-S SPINE 2/3 VWS: CPT | Mod: 26,HCNC,, | Performed by: RADIOLOGY

## 2019-05-07 PROCEDURE — 99999 PR PBB SHADOW E&M-EST. PATIENT-LVL IV: ICD-10-PCS | Mod: PBBFAC,HCNC,, | Performed by: PHYSICIAN ASSISTANT

## 2019-05-07 PROCEDURE — 1101F PR PT FALLS ASSESS DOC 0-1 FALLS W/OUT INJ PAST YR: ICD-10-PCS | Mod: HCNC,CPTII,S$GLB, | Performed by: PHYSICIAN ASSISTANT

## 2019-05-07 PROCEDURE — 3077F PR MOST RECENT SYSTOLIC BLOOD PRESSURE >= 140 MM HG: ICD-10-PCS | Mod: HCNC,CPTII,S$GLB, | Performed by: PHYSICIAN ASSISTANT

## 2019-05-07 PROCEDURE — 99999 PR PBB SHADOW E&M-EST. PATIENT-LVL IV: CPT | Mod: PBBFAC,HCNC,, | Performed by: PHYSICIAN ASSISTANT

## 2019-05-07 PROCEDURE — 3078F PR MOST RECENT DIASTOLIC BLOOD PRESSURE < 80 MM HG: ICD-10-PCS | Mod: HCNC,CPTII,S$GLB, | Performed by: PHYSICIAN ASSISTANT

## 2019-05-07 PROCEDURE — 1101F PT FALLS ASSESS-DOCD LE1/YR: CPT | Mod: HCNC,CPTII,S$GLB, | Performed by: PHYSICIAN ASSISTANT

## 2019-05-07 PROCEDURE — 3078F DIAST BP <80 MM HG: CPT | Mod: HCNC,CPTII,S$GLB, | Performed by: PHYSICIAN ASSISTANT

## 2019-05-07 PROCEDURE — 3077F SYST BP >= 140 MM HG: CPT | Mod: HCNC,CPTII,S$GLB, | Performed by: PHYSICIAN ASSISTANT

## 2019-05-07 PROCEDURE — 99214 PR OFFICE/OUTPT VISIT, EST, LEVL IV, 30-39 MIN: ICD-10-PCS | Mod: HCNC,S$GLB,, | Performed by: PHYSICIAN ASSISTANT

## 2019-05-07 PROCEDURE — 72100 X-RAY EXAM L-S SPINE 2/3 VWS: CPT | Mod: TC,HCNC

## 2019-05-07 RX ORDER — METHYLPREDNISOLONE 4 MG/1
TABLET ORAL
Qty: 1 PACKAGE | Refills: 0 | Status: SHIPPED | OUTPATIENT
Start: 2019-05-07 | End: 2019-06-01 | Stop reason: ALTCHOICE

## 2019-05-07 NOTE — PROGRESS NOTES
Subjective:       Patient ID: Jagdeep Greenberg is a 78 y.o. male.    Chief Complaint: polymyalgia rheumatica    Jagdeep is here for urgent visit for will leg pain, lower back pain w/sciatica and leg weakness.     Dx Parkinson 2 yrs ago; see dr lang at [facility] location; on sinemet and for memory aricept  No recent med chagnes    He c/o of weakness progressing since she did PT in feb, balance worse, unable to get up out of chair as easily as he had/ more lower back pain with leg and gluteal pain.   Last lumbar x-ray done in 2017 showed findings: There is mild dextroscoliotic curvature of the lumbar spine.  Vertebral body heights are well-maintained.  No spondylolisthesis demonstrated.  There is moderate disc height loss at L5-S1 with associated osteophyte production. Posterior elements appear grossly intact. No acute fractures or subluxations are demonstrated.  The remaining visualized osseous and soft tissue structures demonstrate no appreciable abnormality         Treated for PMR in the past. Weaned off prednisone  Over 2 years now. No need to add dmard therapy. Last year mild exacerbation  We added back prednisone 5 mg bid and he felt better within 2 days. Added turmeric  1000 mg bid. He has done better. Esr and crp normal. We have since weaned down and off prednisone. Off X 12 months. No recurrence of PMR symptoms.  He is taking glucosamine and fish oil as well.  Feeling good. No joint pain or stiffness. Today  pain level rated at 010.  Unable to take nonsteroidals because of chronic kidney disease.  In the past uses Tylenol when necessary for pain.      Dx with Parkinson's by neurology-  Now on sinemet tid, doing well just a little gait/imbalane issue.    dexa normal 2014- repeated today 6/6/18- normal     Joint Pain   Pertinent negatives include no abdominal pain, arthralgias, chest pain, chills, congestion, coughing, fatigue, fever, joint swelling, myalgias, nausea, neck pain, numbness, rash, vomiting or  "weakness.   Osteoarthritis   Pertinent negatives include no abdominal pain, arthralgias, chest pain, chills, congestion, coughing, fatigue, fever, joint swelling, myalgias, nausea, neck pain, numbness, rash, vomiting or weakness.       Review of Systems   Constitutional: Negative.  Negative for chills, fatigue and fever.   HENT: Negative.  Negative for congestion, mouth sores and trouble swallowing.    Eyes: Negative.  Negative for photophobia, redness and visual disturbance.   Respiratory: Negative.  Negative for cough, shortness of breath and wheezing.    Cardiovascular: Negative.  Negative for chest pain and leg swelling.   Gastrointestinal: Negative.  Negative for abdominal distention, abdominal pain, diarrhea, nausea and vomiting.   Genitourinary: Negative.  Negative for dysuria, flank pain, frequency and urgency.   Musculoskeletal: Positive for gait problem. Negative for arthralgias, back pain, joint swelling, myalgias, neck pain and neck stiffness.   Skin: Negative.  Negative for rash.   Neurological: Negative for dizziness, weakness and numbness.         Objective:   BP (!) 147/68   Pulse 74   Ht 6' 3" (1.905 m)   Wt 80 kg (176 lb 5.9 oz)   BMI 22.04 kg/m²      Physical Exam   Constitutional: He is oriented to person, place, and time and well-developed, well-nourished, and in no distress. No distress.   HENT:   Head: Normocephalic and atraumatic.   Right Ear: External ear normal.   Left Ear: External ear normal.   Mouth/Throat: No oropharyngeal exudate.   Eyes: Conjunctivae and EOM are normal. Pupils are equal, round, and reactive to light. No scleral icterus.   Neck: Neck supple. No thyromegaly present.   Cardiovascular: Normal rate, regular rhythm and normal heart sounds.    No murmur heard.  Pulmonary/Chest: Effort normal and breath sounds normal. No respiratory distress.   Abdominal: Soft. Bowel sounds are normal.       Right Side Rheumatological Exam     Examination finds the shoulder, elbow, wrist, " knee, 1st PIP, 1st MCP, 2nd PIP, 2nd MCP, 3rd PIP, 3rd MCP, 4th PIP, 4th MCP, 5th PIP and 5th MCP normal.    Muscle Strength (0-5 scale):  Deltoid:  5  Biceps: 5/5   Triceps:  5  : 4/5   Iliopsoas: 5  Quadriceps:  5     Left Side Rheumatological Exam     Examination finds the shoulder, elbow, wrist, knee, 1st PIP, 1st MCP, 2nd PIP, 2nd MCP, 3rd PIP, 3rd MCP, 4th PIP, 4th MCP, 5th PIP and 5th MCP normal.    Muscle Strength (0-5 scale):  Deltoid:  5  Biceps: 5/5   Triceps:  5  :  4/5   Iliopsoas: 5  Quadriceps:  5       Lymphadenopathy:     He has no cervical adenopathy.   Neurological: He is alert and oriented to person, place, and time. No cranial nerve deficit. He exhibits normal muscle tone. Gait normal. Coordination normal.   Skin: Skin is warm and dry.     Musculoskeletal: Normal range of motion. He exhibits deformity. He exhibits no edema or tenderness.   No triggering today  He does have early Dupuytren's contracture bilateral third fingers  No synovitis on exam                  Component      Latest Ref Rng & Units 2/18/2019   Sodium      136 - 145 mmol/L 141   Potassium      3.5 - 5.1 mmol/L 4.2   Chloride      95 - 110 mmol/L 106   CO2      23 - 29 mmol/L 27   Glucose      70 - 110 mg/dL 187 (H)   BUN, Bld      8 - 23 mg/dL 20   Creatinine      0.5 - 1.4 mg/dL 1.0   Calcium      8.7 - 10.5 mg/dL 10.0   PROTEIN TOTAL      6.0 - 8.4 g/dL 7.1   Albumin      3.5 - 5.2 g/dL 4.2   BILIRUBIN TOTAL      0.1 - 1.0 mg/dL 0.8   Alkaline Phosphatase      55 - 135 U/L 59   AST      10 - 40 U/L 25   ALT      10 - 44 U/L 18   Anion Gap      8 - 16 mmol/L 8   eGFR if African American      >60 mL/min/1.73 m:2 >60.0   eGFR if non African American      >60 mL/min/1.73 m:2 >60.0         No results found for this or any previous visit (from the past 168 hour(s)).           Assessment:       1. Primary osteoarthritis involving multiple joints    2. Chronic midline low back pain with bilateral sciatica    3. Chronic  gluteal pain    4. Gait abnormality    5. Parkinsonism, unspecified Parkinsonism type          1. PMR-  Now off prednisone-X 12 monhts- condition now in remission Esr 7-no signs of activity     2. Lower back pain- will gluteal pain with worsening pain but also imbalance and lower ext weakness  Progression of parkinson vs lumbar spine as cause vs both     3. CKD and DM  not able to take nsaids- renal function looks good eGFR 53    4. OA generalized    5. Dupuytren's contracture- early and mild will middle flexor tendon    6. Long term steroid use - normal dexa 2014- repeat dexa 6/6/18- normal with stable bmd compared back to 2014    7.  parkinson gain/imbalance issues on sinement- seeing neurology Dr Lang     Plan:       Repeat lumbar x-ray today   Medrol dose pack    See dr lang next week, ? Is any weakness attributable to his parkinson possible progression  See what she thinks      Remain off daily prednisone, no need to add back- just short burst for spine issues     Repeat dexa 3-4 yrs    Topical voltaren gel qid prn     Continue to Try tummeric 1500 mg  Continue glucosamine and fish oil  Tylenol for pain , avoid nonsteroidals    At home exercise for dupuytren contracture, paraffin and range of motion exercises    rtc 12 weeks  with CBC, CMP, sedimentation rate and CRP

## 2019-05-07 NOTE — Clinical Note
Mr delarosa will see you next weekC/o difficulty getting up out of chair and lower ext weakness along with lower back pain with leg painSome underlying spine degeneration but no concerning exam findings today Will repeat x-ray, give medrol dose packHe did therapy in feb which he reports caused more painOnce you assess him; let me know if you feel any of his weakness issues are more attributable to his parkinson's ThanksLisa Alleman PA-[facility]

## 2019-05-08 ENCOUNTER — PATIENT MESSAGE (OUTPATIENT)
Dept: FAMILY MEDICINE | Facility: CLINIC | Age: 79
End: 2019-05-08

## 2019-05-08 ENCOUNTER — TELEPHONE (OUTPATIENT)
Dept: RHEUMATOLOGY | Facility: CLINIC | Age: 79
End: 2019-05-08

## 2019-05-08 RX ORDER — HYDROCHLOROTHIAZIDE 12.5 MG/1
12.5 TABLET ORAL DAILY
Qty: 90 TABLET | Refills: 3 | Status: SHIPPED | OUTPATIENT
Start: 2019-05-08 | End: 2020-02-06

## 2019-05-08 RX ORDER — BENAZEPRIL HYDROCHLORIDE 20 MG/1
20 TABLET ORAL DAILY
Qty: 90 TABLET | Refills: 3 | Status: SHIPPED | OUTPATIENT
Start: 2019-05-08 | End: 2020-02-06

## 2019-05-08 NOTE — TELEPHONE ENCOUNTER
Called pt  Djd, facet arthritis and ddd to mod/severe degree    No answer left message for him to call back

## 2019-05-08 NOTE — TELEPHONE ENCOUNTER
----- Message from Claudia Patel sent at 5/8/2019  9:08 AM CDT -----  Contact: wife(Joaquin)-[phone]  Type:  Patient Returning Call    Who Called:wife-Joaquin  Who Left Message for Patient:nurse  Does the patient know what this is regarding?:results  Would the patient rather a call back or a response via [facility]? Call back   Best Call Back Number:[phone]  Additional Information:

## 2019-05-09 ENCOUNTER — PATIENT MESSAGE (OUTPATIENT)
Dept: RHEUMATOLOGY | Facility: CLINIC | Age: 79
End: 2019-05-09

## 2019-05-15 ENCOUNTER — OFFICE VISIT (OUTPATIENT)
Dept: NEUROLOGY | Facility: CLINIC | Age: 79
End: 2019-05-15
Payer: MEDICARE

## 2019-05-15 ENCOUNTER — TELEPHONE (OUTPATIENT)
Dept: NEUROLOGY | Facility: CLINIC | Age: 79
End: 2019-05-15

## 2019-05-15 DIAGNOSIS — M54.41 CHRONIC MIDLINE LOW BACK PAIN WITH BILATERAL SCIATICA: ICD-10-CM

## 2019-05-15 DIAGNOSIS — G20.C PARKINSONISM, UNSPECIFIED PARKINSONISM TYPE: ICD-10-CM

## 2019-05-15 DIAGNOSIS — M48.061 SPINAL STENOSIS OF LUMBAR REGION WITHOUT NEUROGENIC CLAUDICATION: Primary | ICD-10-CM

## 2019-05-15 DIAGNOSIS — G89.29 CHRONIC MIDLINE LOW BACK PAIN WITH BILATERAL SCIATICA: ICD-10-CM

## 2019-05-15 DIAGNOSIS — G31.84 MCI (MILD COGNITIVE IMPAIRMENT): ICD-10-CM

## 2019-05-15 DIAGNOSIS — M54.42 CHRONIC MIDLINE LOW BACK PAIN WITH BILATERAL SCIATICA: ICD-10-CM

## 2019-05-15 PROCEDURE — 99999 PR PBB SHADOW E&M-EST. PATIENT-LVL III: CPT | Mod: PBBFAC,HCNC,, | Performed by: PSYCHIATRY & NEUROLOGY

## 2019-05-15 PROCEDURE — 99214 PR OFFICE/OUTPT VISIT, EST, LEVL IV, 30-39 MIN: ICD-10-PCS | Mod: HCNC,S$GLB,, | Performed by: PSYCHIATRY & NEUROLOGY

## 2019-05-15 PROCEDURE — 1101F PT FALLS ASSESS-DOCD LE1/YR: CPT | Mod: HCNC,CPTII,S$GLB, | Performed by: PSYCHIATRY & NEUROLOGY

## 2019-05-15 PROCEDURE — 99214 OFFICE O/P EST MOD 30 MIN: CPT | Mod: HCNC,S$GLB,, | Performed by: PSYCHIATRY & NEUROLOGY

## 2019-05-15 PROCEDURE — 1101F PR PT FALLS ASSESS DOC 0-1 FALLS W/OUT INJ PAST YR: ICD-10-PCS | Mod: HCNC,CPTII,S$GLB, | Performed by: PSYCHIATRY & NEUROLOGY

## 2019-05-15 PROCEDURE — 99999 PR PBB SHADOW E&M-EST. PATIENT-LVL III: ICD-10-PCS | Mod: PBBFAC,HCNC,, | Performed by: PSYCHIATRY & NEUROLOGY

## 2019-05-15 NOTE — PROGRESS NOTES
[facility] - NEUROLOGY EPILEPSY  [facility] [place]    Date: May 15, 2019   Patient Name: Jagdeep Greenberg   MRN: [id number]   PCP: Panda Contrersa  Referring Provider: No ref. provider found    Assessmentn and Plan:   Jagdeep Greenberg is a 78 y.o. male presenting for follow-up MCI and mild parkinsonism.  Patient has had symptomatic response to low-dose dopaminergic therapy.  Will continue this as well as Aricept LSVT BIG and LOUD. at this time.  Will obtain MRI of L-spine given worsening lower extremity subjective weakness and sciatica.    Problem List Items Addressed This Visit        Neuro    Parkinsonism    Current Assessment & Plan     -- continue 1/2 tab sinemet TID  -- completed LSVT LOUD, continues LSVT BIG         MCI (mild cognitive impairment)    Current Assessment & Plan     Continue current aricept           Other Visit Diagnoses     Spinal stenosis of lumbar region without neurogenic claudication    -  Primary    Relevant Orders    MRI Lumbar Spine Without Contrast        Ernie Harden MD  [facility]   Department of Neurology    Patient note was created using Dragon Dictation.  Any errors in syntax or even information may not have been identified and edited on initial review prior to signing this note.  Subjective:   HPI:   Mr. Jagdeep Greenberg is a 78 y.o. male presenting in follow-up for parkinsonism, lacunar stroke. and MCI.  The patient presents today with his wife who contributes to the history.  Together they report that the patient is doing well since last visit.  He has completed speech therapy and continues to work physical therapy.  In recent months has complained of a feeling of leg heaviness and overall weakness pain radiating from his lumbar spine into his buttocks.  Review of the patient's previous lumbar plain film imaging reveals multilevel degenerative disc disease possible neural foraminal stenosis.  He has not undergone a recent MRI  of his L-spine.  He is tolerating Sinemet Aricept with no side effects and his wife feels his cognition is mildly improved.  They have no other new complaints today.    PAST MEDICAL HISTORY:  Past Medical History:   Diagnosis Date    Adenomatous polyp of colon     Arthritis     Back pain     Carotid artery plaque     Less than 50%    Chronic kidney disease, stage 3     Diabetes mellitus, type 2     Diabetes with neurologic complications     Erectile dysfunction following radical prostatectomy     Fractures     Hyperlipidemia LDL goal < 100     Hypertension goal BP (blood pressure) < 130/80     Lacunar stroke 8/17/2017    MCI (mild cognitive impairment)     Parkinson disease     Parkinsonism 4/11/2017    Prostate cancer 2001    Renal manifestation of secondary diabetes mellitus     Stroke      PAST SURGICAL HISTORY:  Past Surgical History:   Procedure Laterality Date    APPENDECTOMY      COLONOSCOPY  In 3/2/2010    Repeat 5 years    COLONOSCOPY N/A 8/23/2018    [facility] (4TH FLR)    COLONOSCOPY N/A 10/24/2017    [facility]    COLONOSCOPY N/A 10/11/2016    [facility]    COLONOSCOPY N/A 9/29/2015    [facility]    COLONOSCOPY - OPERATIVE N/A 1/9/2018    [facility] OR 2ND FLR    FRACTURE SURGERY      HERNIA REPAIR      LYMPH NODE DISSECTION      Pelvic    POLYPECTOMY; endoscopic mucosal resection (EMR) N/A 1/9/2018    [facility] 2ND FLR    PROSTATECTOMY      SHOULDER ARTHROSCOPY      Right     CURRENT MEDS:  Current Outpatient Medications   Medication Sig Dispense Refill    amLODIPine (NORVASC) 10 MG tablet Take 1 tablet (10 mg total) by mouth once daily. 90 tablet 3    aspirin (ECOTRIN) 81 MG EC tablet Take 1 tablet (81 mg total) by mouth once daily.  0    benazepril  (LOTENSIN) 20 MG tablet Take 1 tablet (20 mg total) by mouth once daily. 90 tablet 3    blood sugar diagnostic (GLUCOSE BLOOD) Strp Test glucose 1 x daily 35 strip prn    carbidopa-levodopa  mg (SINEMET)  mg per tablet Take 0.5 tablets by mouth 3 (three) times daily. 135 tablet 3    donepezil (ARICEPT) 5 MG tablet Take 1 tablet (5 mg total) by mouth every evening. 90 tablet 3    fish oil-omega-3 fatty acids 300-1,000 mg capsule Take 2 g by mouth once daily.      glimepiride (AMARYL) 2 MG tablet TAKE 1 TABLET(2 MG) BY MOUTH DAILY WITH BREAKFAST 90 tablet 1    GLUC.METER,DIS.P-LOADED STRIPS (GLUCOSE METER, DISP & STRIPS) Kit Check glucose once per day 1 kit prn    GLUCOSAMINE HCL (GLUCOSAMINE, BULK, MISC) by Misc.(Non-Drug; Combo Route) route.      hydroCHLOROthiazide (HYDRODIURIL) 12.5 MG Tab Take 1 tablet (12.5 mg total) by mouth once daily. 90 tablet 3    L. RHAMNOSUS GG/INULIN (CULTURELLE PROBIOTICS ORAL) Take by mouth.      lancets Misc As directed 100 each prn    MEN'S MULTI-VITAMIN ORAL Take by mouth.      metFORMIN (GLUCOPHAGE) 1000 MG tablet Take 1 tablet (1,000 mg total) by mouth once daily. 90 tablet 3    pravastatin (PRAVACHOL) 20 MG tablet Take 1 tablet (20 mg total) by mouth once daily. 90 tablet 3    turmeric root extract 500 mg Cap Take by mouth.      methylPREDNISolone (MEDROL DOSEPACK) 4 mg tablet use as directed 1 Package 0     No current facility-administered medications for this visit.      ALLERGIES:  Review of patient's allergies indicates:  No Known Allergies    FAMILY HISTORY:  Family History   Problem Relation Age of Onset    Leukemia Father 68    Anesthesia problems Neg Hx      SOCIAL HISTORY:  Social History     Tobacco Use    Smoking status: Never Smoker    Smokeless tobacco: Never Used   Substance Use Topics    Alcohol use: Yes     Alcohol/week: 21.0 oz     Types: 14 Cans of beer, 21 Shots of liquor per week     Comment: 3 drinks a day    Drug use: No      Review of Systems:  12 review of systems is negative except for the symptoms mentioned in HPI.      Objective:     General: NAD, well nourished   Eyes: no tearing, discharge, no erythema   ENT: moist mucous membranes of the oral cavity, nares patent    Neck: Supple, full range of motion  Cardiovascular: Warm and well perfused, pulses equal and symmetrical  Lungs: Normal work of breathing, normal chest wall excursions  Skin: No rash, lesions, or breakdown on exposed skin  Psychiatry: Mood and affect are appropriate   Abdomen: soft, non tender, non distended  Extremeties: No cyanosis, clubbing or edema.    Neurological   MENTAL STATUS: Alert and oriented to person, place, and time. Attention and concentration within normal limits. Speech without dysarthria, able to name and repeat with moderate Recent and remote memory fair to poor   CRANIAL NERVES: Visual fields intact. PERRL. EOMI with slow saccades. Facial sensation intact. Face symmetrical with hypomimia. Hearing grossly intact. Full shoulder shrug bilaterally. Tongue protrudes midline   SENSORY: Sensation is intact to light touch throughout.    MOTOR: Normal bulk and mildly increased tone in RUE>LUE. No resting tremor noted. 5/5 deltoid, biceps, triceps, interosseous, hand  bilaterally. 5/5 iliopsoas, knee extension/flexion, foot dorsi/plantarflexion bilaterally.    REFLEXES: Symmetric and  1 throughout.   CEREBELLAR/COORDINATION/GAIT: Gait with normal stride and slightly reduced arm swing R>L. Negative pull back test. Finger to nose intact. Normal rapid alternating movements.     MOCA Results 17:   Visuospatial/Executive: 3/5  Namin/3  Attention:   Language: 13  Abstraction:   Delayed Recall: 0  Orientation:   Total:

## 2019-05-15 NOTE — TELEPHONE ENCOUNTER
----- Message from Ernie Harden MD sent at 5/15/2019  4:27 PM CDT -----  These are all questions for his PCP.    ----- Message -----  From: Ko Gooden MA  Sent: 5/15/2019   2:16 PM  To: Ernie Harden MD    Should the patient stop taking a 81 mg aspirin daily? Should the patient start taking Celebrex, Do you think that's a good idea? If you think this should be done they will need the prescription sent to the pharmacy.

## 2019-05-15 NOTE — PATIENT INSTRUCTIONS
Magnetic Resonance Imaging (MRI)     You will be asked to hold very still during the scan.     Magnetic resonance imaging (MRI) is a test that lets your doctor see detailed pictures of the inside of your body. MRI combines the use of strong magnets and radio waves to form an MRI image.  How do I get ready for an MRI?  · Follow any directions you are given for not eating or drinking before the test.  · Ask your provider if you should stop taking any medicine before the test.  · Follow your normal daily routine unless your provider tells you otherwise.  · You'll be asked to remove your watch, jewelry, hearing aids, credit cards, pens, pocket knives, eyeglasses, and other metal objects.  · You may be asked to remove your makeup. Makeup may contain some metal.  · Most MRI tests take 30 to 60 minutes. Depending on the type of MRI you are having, the test may take longer. Give yourself extra time to check in.     MRI uses strong magnets. Metal is affected by magnets and can distort the image. The magnet used in MRI can cause metal objects in your body to move. If you have a metal implant, you may not be able to have an MRI unless the implant is certified as MRI safe. People with these implants should not have an MRI:  · Ear (cochlear) implants  · Certain clips used for brain aneurysms  · Certain metal coils put in blood vessels  · Most defibrillators  · Most pacemakers  Be sure to tell the radiologist or technologist if you:  · Have had any previous surgeries  · Have a pacemaker, surgical clips, metal plate or pins, an artificial joint, staples or screws, ear (cochlear) implants, or other implants  · Wear a medicated adhesive patch  · Have metal splinters in your body  · Have implanted nerve stimulators or drug-infusion ports  · Have tattoos or body piercings. Some tattoo inks contain metal.  · Work with metal  · Have braces. You must remove any dental work.  · Have a bullet or other metal in your body  Also tell the  radiologist or technologist if you:  · Are pregnant or think you may be  · Are afraid of small, enclosed spaces (claustrophobic)  · Are allergic to X-ray dye (contrast medium), iodine, shellfish, or any medicines  · Have other allergies  · Are breastfeeding  · Have a history of cancer  · Have any serious health problems. This includes kidney disease or a liver transplant. You may not be able to have the contrast material used for MRI.   What happens during an MRI?  · You may be asked to wear a hospital gown.  · You may be given earplugs to wear if you need them.  · You may be injected with a special dye (contrast) that improves the MRI image.   · Youll lie down on a platform that slides into the magnet.  What happens after an MRI?  · You can get back to normal activities right away. If you were given contrast, it will pass naturally through your body within a day. You may be told to drink more water or other fluids during this time.   · Your doctor will discuss the test results with you during a follow-up appointment or over the phone.  · Your next appointment is: __________________  Date Last Reviewed: 6/2/2015  © 7605-6313 [employer]. [address] [place] [address], [place] [address]. All rights reserved. This information is not intended as a substitute for professional medical care. Always follow your healthcare professional's instructions.

## 2019-05-15 NOTE — TELEPHONE ENCOUNTER
I called the patient and spoke with the wife and let her know that was a question for the primary, she understood.

## 2019-05-16 ENCOUNTER — PATIENT MESSAGE (OUTPATIENT)
Dept: RHEUMATOLOGY | Facility: CLINIC | Age: 79
End: 2019-05-16

## 2019-05-20 ENCOUNTER — TELEPHONE (OUTPATIENT)
Dept: FAMILY MEDICINE | Facility: CLINIC | Age: 79
End: 2019-05-20

## 2019-05-20 ENCOUNTER — LAB VISIT (OUTPATIENT)
Dept: LAB | Facility: HOSPITAL | Age: 79
End: 2019-05-20
Attending: FAMILY MEDICINE
Payer: MEDICARE

## 2019-05-20 DIAGNOSIS — E11.9 TYPE 2 DIABETES MELLITUS WITHOUT COMPLICATION, WITHOUT LONG-TERM CURRENT USE OF INSULIN: ICD-10-CM

## 2019-05-20 LAB
ESTIMATED AVG GLUCOSE: 166 MG/DL (ref 68–131)
HBA1C MFR BLD HPLC: 7.4 % (ref 4–5.6)

## 2019-05-20 PROCEDURE — 36415 COLL VENOUS BLD VENIPUNCTURE: CPT | Mod: HCNC,PO

## 2019-05-20 PROCEDURE — 83036 HEMOGLOBIN GLYCOSYLATED A1C: CPT | Mod: HCNC

## 2019-05-20 RX ORDER — CELECOXIB 200 MG/1
200 CAPSULE ORAL DAILY PRN
Qty: 14 CAPSULE | Refills: 0 | Status: SHIPPED | OUTPATIENT
Start: 2019-05-20 | End: 2019-06-19

## 2019-05-20 RX ORDER — CELECOXIB 200 MG/1
CAPSULE ORAL
Qty: 90 CAPSULE | Refills: 0 | OUTPATIENT
Start: 2019-05-20

## 2019-05-20 RX ORDER — ACETAMINOPHEN 500 MG
1000 TABLET ORAL EVERY 6 HOURS PRN
COMMUNITY
Start: 2019-05-20 | End: 2019-06-19

## 2019-05-21 NOTE — TELEPHONE ENCOUNTER
Contacted regarding  requesting Celebrex for back issues.  Pending MRI through Neurology.  Previous chronic kidney disease stage 3 however last GFR greater than 60.  I still would not recommend this not long-term.  Will send a 2 week prescription.  May use Tylenol as well.

## 2019-05-22 ENCOUNTER — PATIENT MESSAGE (OUTPATIENT)
Dept: FAMILY MEDICINE | Facility: CLINIC | Age: 79
End: 2019-05-22

## 2019-05-22 ENCOUNTER — TELEPHONE (OUTPATIENT)
Dept: FAMILY MEDICINE | Facility: CLINIC | Age: 79
End: 2019-05-22

## 2019-05-22 DIAGNOSIS — I10 HYPERTENSION, UNSPECIFIED TYPE: Primary | ICD-10-CM

## 2019-05-22 DIAGNOSIS — E11.9 TYPE 2 DIABETES MELLITUS WITHOUT COMPLICATION, WITHOUT LONG-TERM CURRENT USE OF INSULIN: ICD-10-CM

## 2019-05-22 DIAGNOSIS — E78.5 HYPERLIPIDEMIA, UNSPECIFIED HYPERLIPIDEMIA TYPE: ICD-10-CM

## 2019-05-22 NOTE — TELEPHONE ENCOUNTER
----- Message from Panda Contreras MD sent at 5/20/2019  7:20 PM CDT -----  Lab ok .  Schedule lipid panel, CMP, hemoglobin A1c,  urine microalbumin in 6 months.  My nurse will contact you to arrange.  Thanks,

## 2019-05-28 ENCOUNTER — TELEPHONE (OUTPATIENT)
Dept: RHEUMATOLOGY | Facility: CLINIC | Age: 79
End: 2019-05-28

## 2019-05-28 NOTE — TELEPHONE ENCOUNTER
Left message for patient to call regarding 8-1 appointment . Ms Hurst will be out of town and appointment needs to be rescheduled.

## 2019-05-29 ENCOUNTER — HOSPITAL ENCOUNTER (OUTPATIENT)
Dept: RADIOLOGY | Facility: HOSPITAL | Age: 79
Discharge: HOME | End: 2019-05-29
Attending: PSYCHIATRY & NEUROLOGY
Payer: MEDICARE

## 2019-05-29 DIAGNOSIS — M48.061 SPINAL STENOSIS OF LUMBAR REGION WITHOUT NEUROGENIC CLAUDICATION: ICD-10-CM

## 2019-05-29 PROCEDURE — 72148 MRI LUMBAR SPINE W/O DYE: CPT | Mod: TC,HCNC

## 2019-05-29 RX ORDER — BENAZEPRIL HYDROCHLORIDE AND HYDROCHLOROTHIAZIDE 20; 12.5 MG/1; MG/1
TABLET ORAL
Qty: 90 TABLET | Refills: 0 | OUTPATIENT
Start: 2019-05-29

## 2019-05-30 ENCOUNTER — PATIENT MESSAGE (OUTPATIENT)
Dept: NEUROLOGY | Facility: CLINIC | Age: 79
End: 2019-05-30

## 2019-05-31 ENCOUNTER — TELEPHONE (OUTPATIENT)
Dept: ORTHOPEDICS | Facility: CLINIC | Age: 79
End: 2019-05-31

## 2019-05-31 ENCOUNTER — PATIENT MESSAGE (OUTPATIENT)
Dept: FAMILY MEDICINE | Facility: CLINIC | Age: 79
End: 2019-05-31

## 2019-05-31 ENCOUNTER — TELEPHONE (OUTPATIENT)
Dept: FAMILY MEDICINE | Facility: CLINIC | Age: 79
End: 2019-05-31

## 2019-05-31 ENCOUNTER — LAB VISIT (OUTPATIENT)
Dept: LAB | Facility: HOSPITAL | Age: 79
End: 2019-05-31
Attending: FAMILY MEDICINE
Payer: MEDICARE

## 2019-05-31 DIAGNOSIS — M25.512 LEFT SHOULDER PAIN, UNSPECIFIED CHRONICITY: ICD-10-CM

## 2019-05-31 DIAGNOSIS — M35.3 POLYMYALGIA RHEUMATICA: ICD-10-CM

## 2019-05-31 DIAGNOSIS — M25.512 LEFT SHOULDER PAIN, UNSPECIFIED CHRONICITY: Primary | ICD-10-CM

## 2019-05-31 DIAGNOSIS — M54.16 LUMBAR RADICULOPATHY: Primary | ICD-10-CM

## 2019-05-31 LAB
CRP SERPL-MCNC: 44.2 MG/L (ref 0–8.2)
ERYTHROCYTE [SEDIMENTATION RATE] IN BLOOD BY WESTERGREN METHOD: 30 MM/HR (ref 0–10)

## 2019-05-31 PROCEDURE — 36415 COLL VENOUS BLD VENIPUNCTURE: CPT | Mod: HCNC,PO

## 2019-05-31 PROCEDURE — 85651 RBC SED RATE NONAUTOMATED: CPT | Mod: HCNC,PO

## 2019-05-31 PROCEDURE — 86140 C-REACTIVE PROTEIN: CPT | Mod: HCNC

## 2019-05-31 NOTE — TELEPHONE ENCOUNTER
Okay referral.  It looks like he is having a lot of more diffuse joint problems as well.  Could possibly be the polymyalgia rheumatica flaring.  Please get CRP and sedimentation rate now.

## 2019-05-31 NOTE — TELEPHONE ENCOUNTER
----- Message from Renetta Harden sent at 5/31/2019  8:56 AM CDT -----  Contact: Wife  Wife is calling in regards to referral for orthopedic surgery. Wife would like to speak with nurse regarding referral.      Pls call wife back at .[phone]

## 2019-05-31 NOTE — TELEPHONE ENCOUNTER
Per Wife, patient with c/o severe left shoulder pain, interrupting sleep, some back pain, will be seeing pain mgmt 6/16.  Asking for ortho surgery referral, does not want to have to go to [place], please advise.

## 2019-05-31 NOTE — PROGRESS NOTES
DATE: 5/31/2019  PATIENT: Jagdeep Greenberg  REFERRING MD:   CHIEF COMPLAINT:   Chief Complaint   Patient presents with    Left Shoulder - Pain       HISTORY:  Jagdeep Greenberg is a 78 y.o. male  who presents for initial evaluation of his left shoulder pain, he is right hand dominant.  He is a new patient to me referred by Dr Panda Conterras for orthopedic evaluation.   His pain rating today is 0/10 but increases with over head activity and during sleep.  He reports he has had shoulder pain for years but it has worsened recently.  He reports loss of strength in the shoulder, and has loss of strength in the hands due to Parkinson's. He denies injury or fall.  He does not recall having an injection before. He did have rotator cuff repair on the right shoulder in the past.  He has been to physical therapy recently for gait training for his parkinson's and he would like to avoid additional PT at this time.  He reports his wife is in [place] today for cardiac procedures, including heart ablation.      PAST MEDICAL/SURGICAL HISTORY:  Past Medical History:   Diagnosis Date    Adenomatous polyp of colon     Arthritis     Back pain     Carotid artery plaque     Less than 50%    Chronic kidney disease, stage 3     Diabetes mellitus, type 2     Diabetes with neurologic complications     Erectile dysfunction following radical prostatectomy     Fractures     Hyperlipidemia LDL goal < 100     Hypertension goal BP (blood pressure) < 130/80     Lacunar stroke 8/17/2017    MCI (mild cognitive impairment)     Parkinson disease     Parkinsonism 4/11/2017    Prostate cancer 2001    Renal manifestation of secondary diabetes mellitus     Stroke      Past Surgical History:   Procedure Laterality Date    APPENDECTOMY      COLONOSCOPY  In 3/2/2010    Repeat 5 years    COLONOSCOPY N/A 8/23/2018    [facility] ([facility]    COLONOSCOPY N/A 10/24/2017    [facility]formed [facility] Erik ALBERTO  [facility] Rikki [facility] ENDO    COLONOSCOPY N/A 10/11/2016    [facility] END[facility]    COLONOSCOPY N/A 9/29/2015    [facility] END[facility]    COLONOSCOPY - OPERATIVE N/A 1/9/2018    [facility] OR 2ND FLR    FRACTURE SURGERY      HERNIA REPAIR      LYMPH NODE DISSECTION      Pelvic    POLYPECTOMY; endoscopic mucosal resection (EMR) N/A 1/9/2018    [facility] OR 2ND FLR    PROSTATECTOMY      SHOULDER ARTHROSCOPY      Right       Current Medications:   Current Outpatient Medications:     acetaminophen (TYLENOL) 500 MG tablet, Take 2 tablets (1,000 mg total) by mouth every 6 (six) hours as needed for Pain., Disp: , Rfl:     amLODIPine (NORVASC) 10 MG tablet, Take 1 tablet (10 mg total) by mouth once daily., Disp: 90 tablet, Rfl: 3    aspirin (ECOTRIN) 81 MG EC tablet, Take 1 tablet (81 mg total) by mouth once daily., Disp: , Rfl: 0    benazepril (LOTENSIN) 20 MG tablet, Take 1 tablet (20 mg total) by mouth once daily., Disp: 90 tablet, Rfl: 3    blood sugar diagnostic (GLUCOSE BLOOD) Strp, Test glucose 1 x daily, Disp: 35 strip, Rfl: prn    carbidopa-levodopa  mg (SINEMET)  mg per tablet, Take 0.5 tablets by mouth 3 (three) times daily., Disp: 135 tablet, Rfl: 3    celecoxib (CELEBREX) 200 MG capsule, Take 1 capsule (200 mg total) by mouth daily as needed for Pain., Disp: 14 capsule, Rfl: 0    donepezil (ARICEPT) 5 MG tablet, Take 1 tablet (5 mg total) by mouth every evening., Disp: 90 tablet, Rfl: 3    fish oil-omega-3 fatty acids 300-1,000 mg capsule, Take 2 g by mouth once daily., Disp: , Rfl:     glimepiride (AMARYL) 2 MG tablet, TAKE 1 TABLET(2 MG) BY MOUTH DAILY WITH BREAKFAST, Disp: 90 tablet, Rfl: 1    GLUC.METER,DIS.P-LOADED STRIPS (GLUCOSE METER, DISP & STRIPS) Kit, Check glucose once per day, Disp: 1 kit, Rfl: prn    GLUCOSAMINE HCL (GLUCOSAMINE, BULK, MISC), by  Misc.(Non-Drug; Combo Route) route., Disp: , Rfl:     hydroCHLOROthiazide (HYDRODIURIL) 12.5 MG Tab, Take 1 tablet (12.5 mg total) by mouth once daily., Disp: 90 tablet, Rfl: 3    L. RHAMNOSUS GG/INULIN (CULTURELLE PROBIOTICS ORAL), Take by mouth., Disp: , Rfl:     lancets Misc, As directed, Disp: 100 each, Rfl: prn    MEN'S MULTI-VITAMIN ORAL, Take by mouth., Disp: , Rfl:     metFORMIN (GLUCOPHAGE) 1000 MG tablet, Take 1 tablet (1,000 mg total) by mouth once daily., Disp: 90 tablet, Rfl: 3    methylPREDNISolone (MEDROL DOSEPACK) 4 mg tablet, use as directed, Disp: 1 Package, Rfl: 0    pravastatin (PRAVACHOL) 20 MG tablet, Take 1 tablet (20 mg total) by mouth once daily., Disp: 90 tablet, Rfl: 3    turmeric root extract 500 mg Cap, Take by mouth., Disp: , Rfl:     Family History: family history was reviewed and is noncontributory  Social History:   Social History     Socioeconomic History    Marital status:      Spouse name: Joaquin    Number of children: 0    Years of education: Not on file    Highest education level: Not on file   Occupational History    Occupation: Retired   Social Needs    Financial resource strain: Not on file    Food insecurity:     Worry: Not on file     Inability: Not on file    Transportation needs:     Medical: Not on file     Non-medical: Not on file   Tobacco Use    Smoking status: Never Smoker    Smokeless tobacco: Never Used   Substance and Sexual Activity    Alcohol use: Yes     Alcohol/week: 21.0 oz     Types: 14 Cans of beer, 21 Shots of liquor per week     Comment: 3 drinks a day    Drug use: No    Sexual activity: Not on file   Lifestyle    Physical activity:     Days per week: Not on file     Minutes per session: Not on file    Stress: Not on file   Relationships    Social connections:     Talks on phone: Not on file     Gets together: Not on file     Attends Buddhist service: Not on file     Active member of club or organization: Not on file      "Attends meetings of clubs or organizations: Not on file     Relationship status: Not on file   Other Topics Concern    Not on file   Social History Narrative    Not on file       ROS:  Constitution: Negative for chills, fever, and sweats. Negative for unexplained weight loss.  Eyes: no redness, no discharge  Ears: no ear pain or tinnitus  Cardiovascular: Negative for chest pain, irregular heartbeat, leg swelling and palpitations.   Respiratory: Negative for cough and shortness of breath.   Gastrointestinal: Negative for abdominal pain, nausea and vomiting.   Genitourinary: Negative for bladder incontinence and dysuria.   Neurological: Negative for numbness.   Psychiatric/Behavioral: Negative for behavior changes.   Endocrine: Negative for palpitations.   Hematologic/Lymphatic: Negative for bleeding disorders.  Skin: Negative for pruritis or rash.     PHYSICAL EXAM:  Ht 6' 3" (1.905 m)   Wt 79.8 kg (176 lb)   BMI 22.00 kg/m²   Jagdeep Greenberg is a well developed, well nourished male in no acute distress. Physical examination of the left shoulder evaluated the following:    Inspection, palpation and ROM of the cervical spine  Disc compression testing bilaterally  Inspection for swelling, ecchymosis, erythema, deformity and atrophy  Tenderness to palpation of the soft tissue and bony structures  Active and passive range of motion  Sensation of the shoulder and upper extremity  Motor strength in the deltoid, supraspinatus, internal rotators and external rotators  Impingement, apprehension, relocation and Speed's tests  Upper extremity vascular exam (skin temp,color, capillary refill)  Inspection for pseudomotor signs    Remarkable findings included:  No edema, effusion, ecchymosis or obvious deformity  Nontender to palpation   ROM full with pain  Strength 4/5  No gross instability  Sensation intact  Skin warm, dry, intact      IMAGING:   X-ray obtained Left shoulder performed today personally reviewed with " patient. Radiologist report as follows:   No acute fracture or dislocation.  Mild AC joint arthropathy is noted.  There is at least mild degenerative change noted at the glenohumeral joint.  This is progressive when compared to the study from 2017.    ASSESSMENT:   Left shoulder pain, calcific tendonitis  Left shoulder osteoarthritis    PLAN:  The nature of the diagnosis, using models and diagrams when appropriate, was explained to the patient in detail. Treatment option discussed included non-operative measures of rest,  modification of activities, application of ice, over the counter pain/antiinflammatory relief, physica/occupational therapy and cortisone injection.  More aggressive treatment options include referal to orthopedic surgeon and Dr Souza.  All questions answered and the patient wishes to proceed today with cortisone injection and home exercise program.  Left shoulder cortisone injection performed today (see procedure documentation).  I have instructed to monitor injection site for signs and symptoms of inflammation/infection.  I have instructed to elevate and apply ice to shoulder this evening.   Follow up if no improvement or worsening of symptoms.

## 2019-06-01 ENCOUNTER — PATIENT MESSAGE (OUTPATIENT)
Dept: FAMILY MEDICINE | Facility: CLINIC | Age: 79
End: 2019-06-01

## 2019-06-01 DIAGNOSIS — M35.3 PMR (POLYMYALGIA RHEUMATICA): Primary | ICD-10-CM

## 2019-06-01 RX ORDER — PREDNISONE 5 MG/1
10 TABLET ORAL DAILY
Qty: 60 TABLET | Refills: 1 | Status: SHIPPED | OUTPATIENT
Start: 2019-06-01 | End: 2019-07-25

## 2019-06-01 NOTE — TELEPHONE ENCOUNTER
Contacted patient. Lab shows evidence increase inflammation consistent with probable relapse of his polymyalgia rheumatica.  Contacted patient by phone.  Start prednisone 15 mg daily next 2 days, then 10 mg daily.  Prescription sent.  Let us know how he is feeling next week.  Keep appointment scheduled with Rheumatology.Recheck sedimentation rate, CRP in 2 weeks.  My nurse will contact you to arrange.  Thanks,  Dr. Contreras

## 2019-06-03 ENCOUNTER — TELEPHONE (OUTPATIENT)
Dept: RHEUMATOLOGY | Facility: CLINIC | Age: 79
End: 2019-06-03

## 2019-06-03 ENCOUNTER — OFFICE VISIT (OUTPATIENT)
Dept: ORTHOPEDICS | Facility: CLINIC | Age: 79
End: 2019-06-03
Payer: MEDICARE

## 2019-06-03 ENCOUNTER — HOSPITAL ENCOUNTER (OUTPATIENT)
Dept: RADIOLOGY | Facility: HOSPITAL | Age: 79
Discharge: HOME | End: 2019-06-03
Attending: NURSE PRACTITIONER
Payer: MEDICARE

## 2019-06-03 VITALS — WEIGHT: 176 LBS | BODY MASS INDEX: 21.88 KG/M2 | HEIGHT: 75 IN

## 2019-06-03 DIAGNOSIS — M19.012 PRIMARY OSTEOARTHRITIS, LEFT SHOULDER: ICD-10-CM

## 2019-06-03 DIAGNOSIS — M77.8 LEFT SHOULDER TENDONITIS: Primary | ICD-10-CM

## 2019-06-03 DIAGNOSIS — M25.512 LEFT SHOULDER PAIN, UNSPECIFIED CHRONICITY: ICD-10-CM

## 2019-06-03 PROCEDURE — 73030 XR SHOULDER COMPLETE 2 OR MORE VIEWS LEFT: ICD-10-PCS | Mod: 26,HCNC,LT, | Performed by: RADIOLOGY

## 2019-06-03 PROCEDURE — 73030 X-RAY EXAM OF SHOULDER: CPT | Mod: 26,HCNC,LT, | Performed by: RADIOLOGY

## 2019-06-03 PROCEDURE — 99203 OFFICE O/P NEW LOW 30 MIN: CPT | Mod: HCNC,25,S$GLB, | Performed by: NURSE PRACTITIONER

## 2019-06-03 PROCEDURE — 99203 PR OFFICE/OUTPT VISIT, NEW, LEVL III, 30-44 MIN: ICD-10-PCS | Mod: HCNC,25,S$GLB, | Performed by: NURSE PRACTITIONER

## 2019-06-03 PROCEDURE — 73030 X-RAY EXAM OF SHOULDER: CPT | Mod: TC,HCNC,PO,LT

## 2019-06-03 PROCEDURE — 20610 LARGE JOINT ASPIRATION/INJECTION: L SUBACROMIAL BURSA: ICD-10-PCS | Mod: HCNC,LT,S$GLB, | Performed by: NURSE PRACTITIONER

## 2019-06-03 PROCEDURE — 99999 PR PBB SHADOW E&M-EST. PATIENT-LVL II: ICD-10-PCS | Mod: PBBFAC,HCNC,, | Performed by: NURSE PRACTITIONER

## 2019-06-03 PROCEDURE — 99999 PR PBB SHADOW E&M-EST. PATIENT-LVL II: CPT | Mod: PBBFAC,HCNC,, | Performed by: NURSE PRACTITIONER

## 2019-06-03 PROCEDURE — 20610 DRAIN/INJ JOINT/BURSA W/O US: CPT | Mod: HCNC,LT,S$GLB, | Performed by: NURSE PRACTITIONER

## 2019-06-03 RX ORDER — TRIAMCINOLONE ACETONIDE 40 MG/ML
40 INJECTION, SUSPENSION INTRA-ARTICULAR; INTRAMUSCULAR
Status: DISCONTINUED | OUTPATIENT
Start: 2019-06-03 | End: 2019-06-03 | Stop reason: HOSPADM

## 2019-06-03 RX ADMIN — TRIAMCINOLONE ACETONIDE 40 MG: 40 INJECTION, SUSPENSION INTRA-ARTICULAR; INTRAMUSCULAR at 10:06

## 2019-06-03 NOTE — TELEPHONE ENCOUNTER
Spoke with Ms Ward's nurse in [place]. Patient is seeing her today . Please have patient do labs before leaving and let him know Ms Hurst wants to see him tomorrow. Scheduled here at [place] 6-4 at 10:30 am. Nurse will inform patient.

## 2019-06-03 NOTE — LETTER
Emily 3, 2019      Panda Contreras MD  [address]  [place] [address]           [facility]  [address] [facility] [address]  Phone: [phone]          Patient: Jagdeep Greenberg   MR Number: [id number]   YOB: 1940   Date of Visit: 6/3/2019       Dear Dr. Panda Contreras:    Thank you for referring Jagdeep Greenberg to me for evaluation. Attached you will find relevant portions of my assessment and plan of care.    If you have questions, please do not hesitate to call me. I look forward to following Jagdeep Greenberg along with you.    Sincerely,    Genesis Sams, APRN    Enclosure  CC:  No Recipients    If you would like to receive this communication electronically, please contact [email or web address] or [phone] to request more information on [employer] access.    For providers and/or their staff who would like to refer a patient to [facility], please contact us through our one-stop-shop provider referral line, [facility], at [phone].    If you feel you have received this communication in error or would no longer like to receive these types of communications, please e-mail [email or web address]

## 2019-06-03 NOTE — TELEPHONE ENCOUNTER
----- Message from Zarina Hurst PA-C sent at 6/3/2019  7:45 AM CDT -----  Regarding: recheck   Scrap the prior message  Get him to repeat his esr and crp today   And see me tomorrow    Zarina

## 2019-06-03 NOTE — PROCEDURES
Large Joint Aspiration/Injection: L subacromial bursa  Date/Time: 6/3/2019 10:49 AM  Performed by: JEANMARIE Kemp  Authorized by: JEANMARIE Kemp     Consent Done?:  Yes (Verbal)  Indications:  Pain  Timeout: Prior to procedure the correct patient, procedure, and site was verified      Location:  Shoulder  Site:  L subacromial bursa  Prep: Patient was prepped and draped in usual sterile fashion    Ultrasonic Guidance for needle placement: No  Needle size:  25 G  Approach:  Posterior  Medications:  40 mg triamcinolone acetonide 40 mg/mL  Patient tolerance:  Patient tolerated the procedure well with no immediate complications

## 2019-06-04 ENCOUNTER — PATIENT MESSAGE (OUTPATIENT)
Dept: RHEUMATOLOGY | Facility: CLINIC | Age: 79
End: 2019-06-04

## 2019-06-11 ENCOUNTER — PATIENT OUTREACH (OUTPATIENT)
Dept: ADMINISTRATIVE | Facility: HOSPITAL | Age: 79
End: 2019-06-11

## 2019-06-11 ENCOUNTER — OFFICE VISIT (OUTPATIENT)
Dept: FAMILY MEDICINE | Facility: CLINIC | Age: 79
End: 2019-06-11
Payer: MEDICARE

## 2019-06-11 VITALS
SYSTOLIC BLOOD PRESSURE: 119 MMHG | WEIGHT: 173.19 LBS | HEART RATE: 61 BPM | DIASTOLIC BLOOD PRESSURE: 63 MMHG | HEIGHT: 75 IN | TEMPERATURE: 98 F | BODY MASS INDEX: 21.53 KG/M2

## 2019-06-11 DIAGNOSIS — N18.30 TYPE 2 DIABETES MELLITUS WITH STAGE 3 CHRONIC KIDNEY DISEASE, WITHOUT LONG-TERM CURRENT USE OF INSULIN: ICD-10-CM

## 2019-06-11 DIAGNOSIS — Z85.46 HISTORY OF PROSTATE CANCER: ICD-10-CM

## 2019-06-11 DIAGNOSIS — M35.3 PMR (POLYMYALGIA RHEUMATICA): ICD-10-CM

## 2019-06-11 DIAGNOSIS — E11.40 TYPE 2 DIABETES MELLITUS WITH DIABETIC NEUROPATHY, WITHOUT LONG-TERM CURRENT USE OF INSULIN: ICD-10-CM

## 2019-06-11 DIAGNOSIS — F10.10 EXCESSIVE DRINKING ALCOHOL: ICD-10-CM

## 2019-06-11 DIAGNOSIS — E11.22 TYPE 2 DIABETES MELLITUS WITH STAGE 3 CHRONIC KIDNEY DISEASE, WITHOUT LONG-TERM CURRENT USE OF INSULIN: ICD-10-CM

## 2019-06-11 DIAGNOSIS — M54.41 CHRONIC MIDLINE LOW BACK PAIN WITH BILATERAL SCIATICA: ICD-10-CM

## 2019-06-11 DIAGNOSIS — M54.42 CHRONIC MIDLINE LOW BACK PAIN WITH BILATERAL SCIATICA: ICD-10-CM

## 2019-06-11 DIAGNOSIS — G89.29 CHRONIC MIDLINE LOW BACK PAIN WITH BILATERAL SCIATICA: ICD-10-CM

## 2019-06-11 DIAGNOSIS — E11.69 COMBINED HYPERLIPIDEMIA ASSOCIATED WITH TYPE 2 DIABETES MELLITUS: ICD-10-CM

## 2019-06-11 DIAGNOSIS — G31.84 MCI (MILD COGNITIVE IMPAIRMENT): ICD-10-CM

## 2019-06-11 DIAGNOSIS — R26.9 GAIT ABNORMALITY: ICD-10-CM

## 2019-06-11 DIAGNOSIS — N18.30 CHRONIC KIDNEY DISEASE, STAGE 3: ICD-10-CM

## 2019-06-11 DIAGNOSIS — K57.30 DIVERTICULOSIS OF LARGE INTESTINE WITHOUT HEMORRHAGE: ICD-10-CM

## 2019-06-11 DIAGNOSIS — E78.2 COMBINED HYPERLIPIDEMIA ASSOCIATED WITH TYPE 2 DIABETES MELLITUS: ICD-10-CM

## 2019-06-11 DIAGNOSIS — Z86.010 H/O ADENOMATOUS POLYP OF COLON: ICD-10-CM

## 2019-06-11 DIAGNOSIS — E11.59 HYPERTENSION ASSOCIATED WITH DIABETES: ICD-10-CM

## 2019-06-11 DIAGNOSIS — I63.81 LACUNAR STROKE: ICD-10-CM

## 2019-06-11 DIAGNOSIS — M15.9 PRIMARY OSTEOARTHRITIS INVOLVING MULTIPLE JOINTS: ICD-10-CM

## 2019-06-11 DIAGNOSIS — G20.C PARKINSONISM, UNSPECIFIED PARKINSONISM TYPE: ICD-10-CM

## 2019-06-11 DIAGNOSIS — I15.2 HYPERTENSION ASSOCIATED WITH DIABETES: ICD-10-CM

## 2019-06-11 DIAGNOSIS — M72.0 DUPUYTREN'S CONTRACTURE OF BOTH HANDS: ICD-10-CM

## 2019-06-11 DIAGNOSIS — I65.23 ATHEROSCLEROSIS OF BOTH CAROTID ARTERIES: Primary | ICD-10-CM

## 2019-06-11 PROCEDURE — 99499 RISK ADDL DX/OHS AUDIT: ICD-10-PCS | Mod: HCNC,S$GLB,, | Performed by: NURSE PRACTITIONER

## 2019-06-11 PROCEDURE — 3074F SYST BP LT 130 MM HG: CPT | Mod: HCNC,CPTII,S$GLB, | Performed by: NURSE PRACTITIONER

## 2019-06-11 PROCEDURE — 99499 UNLISTED E&M SERVICE: CPT | Mod: HCNC,S$GLB,, | Performed by: NURSE PRACTITIONER

## 2019-06-11 PROCEDURE — 99999 PR PBB SHADOW E&M-EST. PATIENT-LVL III: CPT | Mod: PBBFAC,HCNC,, | Performed by: NURSE PRACTITIONER

## 2019-06-11 PROCEDURE — G0439 PR MEDICARE ANNUAL WELLNESS SUBSEQUENT VISIT: ICD-10-PCS | Mod: HCNC,S$GLB,, | Performed by: NURSE PRACTITIONER

## 2019-06-11 PROCEDURE — 99999 PR PBB SHADOW E&M-EST. PATIENT-LVL III: ICD-10-PCS | Mod: PBBFAC,HCNC,, | Performed by: NURSE PRACTITIONER

## 2019-06-11 PROCEDURE — G0439 PPPS, SUBSEQ VISIT: HCPCS | Mod: HCNC,S$GLB,, | Performed by: NURSE PRACTITIONER

## 2019-06-11 PROCEDURE — 3074F PR MOST RECENT SYSTOLIC BLOOD PRESSURE < 130 MM HG: ICD-10-PCS | Mod: HCNC,CPTII,S$GLB, | Performed by: NURSE PRACTITIONER

## 2019-06-11 PROCEDURE — 3078F DIAST BP <80 MM HG: CPT | Mod: HCNC,CPTII,S$GLB, | Performed by: NURSE PRACTITIONER

## 2019-06-11 PROCEDURE — 3078F PR MOST RECENT DIASTOLIC BLOOD PRESSURE < 80 MM HG: ICD-10-PCS | Mod: HCNC,CPTII,S$GLB, | Performed by: NURSE PRACTITIONER

## 2019-06-11 NOTE — PROGRESS NOTES
"Jagdeep Greenberg presented for a  Medicare AWV and comprehensive Health Risk Assessment today. The following components were reviewed and updated:    · Medical history  · Family History  · Social history  · Allergies and Current Medications  · Health Risk Assessment  · Health Maintenance  · Care Team     ** See Completed Assessments for Annual Wellness Visit within the encounter summary.**       The following assessments were completed:  · Living Situation  · CAGE  · Depression Screening  · Timed Get Up and Go  · Whisper Test  · Cognitive Function Screening  · Nutrition Screening  · ADL Screening  · PAQ Screening    Vitals:    06/11/19 1315   BP: 119/63   Pulse: 61   Temp: 98.1 °F (36.7 °C)   TempSrc: Oral   Weight: 78.6 kg (173 lb 3.2 oz)   Height: 6' 3" (1.905 m)     Body mass index is 21.65 kg/m².  Physical Exam   Constitutional: He is oriented to person, place, and time. He appears well-developed. No distress.   HENT:   Head: Normocephalic and atraumatic.   Eyes: Pupils are equal, round, and reactive to light. EOM are normal.   Neck: Normal range of motion. Neck supple.   Cardiovascular: Normal rate and regular rhythm.   Pulmonary/Chest: Effort normal and breath sounds normal.   Musculoskeletal: Normal range of motion.   Neurological: He is alert and oriented to person, place, and time.   Skin: Skin is warm and dry. No rash noted.   Psychiatric: He has a normal mood and affect. Thought content normal.   Nursing note and vitals reviewed.        Diagnoses and health risks identified today and associated recommendations/orders:    1. Atherosclerosis of both carotid arteries  Stable-routine follow-up.  Continue pravastatin    2. Combined hyperlipidemia associated with type 2 diabetes mellitus  Stable and controlled on pravastatin, metformin, glimepiride  Continue medication as prescribed  Continue current treatment plan as previously prescribed with your PCP      3. Hypertension associated with diabetes  Stable and " controlled on amlodipine, benazepril, hydrochlorothiazide  Continue medication as prescribed  Continue current treatment plan as previously prescribed with your PCP      4. Chronic kidney disease, stage 3  Stable-routine lab and follow-up    5. PMR (polymyalgia rheumatica)  Stable and controlled on prednisone  Continue medication as prescribed  Continue current treatment plan as previously prescribed with your PCP      6. History of prostate cancer  Stable-routine follow-up with Urology    7. Type 2 diabetes mellitus with diabetic neuropathy, without long-term current use of insulin  Stable and controlled on glimepiride, metformin  Continue medication as prescribed  Continue current treatment plan as previously prescribed with your PCP      8. Type 2 diabetes mellitus with stage 3 chronic kidney disease, without long-term current use of insulin  Stable and controlled on glimepiride, metformin  Continue medication as prescribed  Continue current treatment plan as previously prescribed with your PCP      9. Lacunar stroke  Stable-routine follow-up    10. MCI (mild cognitive impairment)  Stable-follow up for worsening symptoms    11. Parkinsonism, unspecified Parkinsonism type  Stable and controlled on Sinemet  Continue medication as prescribed  Continue current treatment plan as previously prescribed with your PCP      12. Excessive drinking alcohol  Improved-encourage patient to continue no alcohol    13. H/O adenomatous polyp of colon  Stable-routine colonoscopy    14. Diverticulosis of large intestine without hemorrhage  Stable-follow up for worsening symptoms    15. Dupuytren's contracture of both hands  Stable and controlled on Celebrex  Continue medication as prescribed  Continue current treatment plan as previously prescribed with your PCP      16. Primary osteoarthritis involving multiple joints  Stable and controlled on Celebrex  Continue medication as prescribed  Continue current treatment plan as previously  prescribed with your PCP      17. Chronic midline low back pain with bilateral sciatica  Stable and controlled on Celebrex  Continue medication as prescribed  Continue current treatment plan as previously prescribed with your PCP      18. Gait abnormality  Stable-follow up for worsening symptoms      Provided Jagdeep with a 5-10 year written screening schedule and personal prevention plan. Recommendations were developed using the USPSTF age appropriate recommendations. Education, counseling, and referrals were provided as needed. After Visit Summary printed and given to patient which includes a list of additional screenings\tests needed.    No follow-ups on file.    Juan Aceves NP

## 2019-06-19 ENCOUNTER — OFFICE VISIT (OUTPATIENT)
Dept: PAIN MEDICINE | Facility: CLINIC | Age: 79
End: 2019-06-19
Payer: MEDICARE

## 2019-06-19 ENCOUNTER — TELEPHONE (OUTPATIENT)
Dept: PAIN MEDICINE | Facility: CLINIC | Age: 79
End: 2019-06-19

## 2019-06-19 VITALS
HEART RATE: 73 BPM | WEIGHT: 173 LBS | DIASTOLIC BLOOD PRESSURE: 76 MMHG | SYSTOLIC BLOOD PRESSURE: 151 MMHG | BODY MASS INDEX: 21.62 KG/M2

## 2019-06-19 DIAGNOSIS — M54.16 LUMBAR RADICULOPATHY: ICD-10-CM

## 2019-06-19 DIAGNOSIS — M47.816 LUMBAR SPONDYLOSIS: Primary | ICD-10-CM

## 2019-06-19 DIAGNOSIS — M48.062 SPINAL STENOSIS OF LUMBAR REGION WITH NEUROGENIC CLAUDICATION: ICD-10-CM

## 2019-06-19 DIAGNOSIS — M51.36 DDD (DEGENERATIVE DISC DISEASE), LUMBAR: ICD-10-CM

## 2019-06-19 PROCEDURE — 99999 PR PBB SHADOW E&M-EST. PATIENT-LVL III: ICD-10-PCS | Mod: PBBFAC,HCNC,, | Performed by: PAIN MEDICINE

## 2019-06-19 PROCEDURE — 1101F PR PT FALLS ASSESS DOC 0-1 FALLS W/OUT INJ PAST YR: ICD-10-PCS | Mod: HCNC,CPTII,S$GLB, | Performed by: PAIN MEDICINE

## 2019-06-19 PROCEDURE — 1101F PT FALLS ASSESS-DOCD LE1/YR: CPT | Mod: HCNC,CPTII,S$GLB, | Performed by: PAIN MEDICINE

## 2019-06-19 PROCEDURE — 99204 PR OFFICE/OUTPT VISIT, NEW, LEVL IV, 45-59 MIN: ICD-10-PCS | Mod: HCNC,S$GLB,, | Performed by: PAIN MEDICINE

## 2019-06-19 PROCEDURE — 3077F SYST BP >= 140 MM HG: CPT | Mod: HCNC,CPTII,S$GLB, | Performed by: PAIN MEDICINE

## 2019-06-19 PROCEDURE — 99999 PR PBB SHADOW E&M-EST. PATIENT-LVL III: CPT | Mod: PBBFAC,HCNC,, | Performed by: PAIN MEDICINE

## 2019-06-19 PROCEDURE — 3078F PR MOST RECENT DIASTOLIC BLOOD PRESSURE < 80 MM HG: ICD-10-PCS | Mod: HCNC,CPTII,S$GLB, | Performed by: PAIN MEDICINE

## 2019-06-19 PROCEDURE — 99499 RISK ADDL DX/OHS AUDIT: ICD-10-PCS | Mod: HCNC,S$GLB,, | Performed by: PAIN MEDICINE

## 2019-06-19 PROCEDURE — 99204 OFFICE O/P NEW MOD 45 MIN: CPT | Mod: HCNC,S$GLB,, | Performed by: PAIN MEDICINE

## 2019-06-19 PROCEDURE — 3078F DIAST BP <80 MM HG: CPT | Mod: HCNC,CPTII,S$GLB, | Performed by: PAIN MEDICINE

## 2019-06-19 PROCEDURE — 99499 UNLISTED E&M SERVICE: CPT | Mod: HCNC,S$GLB,, | Performed by: PAIN MEDICINE

## 2019-06-19 PROCEDURE — 3077F PR MOST RECENT SYSTOLIC BLOOD PRESSURE >= 140 MM HG: ICD-10-PCS | Mod: HCNC,CPTII,S$GLB, | Performed by: PAIN MEDICINE

## 2019-06-19 NOTE — PROGRESS NOTES
[facility] New Patient Evaluation    Referred by: Ernie Harden MD   Reason for referral: Lumbar radiculopathy     CC:   Chief Complaint   Patient presents with    Low-back Pain     Last 3 PDI Scores 6/19/2019   Pain Disability Index (PDI) 9       HPI:   Jagdeep Greenberg is a 78 y.o. male who complains of chronic low back pain.  He reports a history of pain shooting down the leg, but is not experiencing that at this time.  He also reports symptoms consistent with mild neurogenic claudication.    Location: low back   Onset: 2 yrs ago  Current Pain Score: 4/10  Daily Pain of Range: 4-6/10  Quality: Aching and Burning  Radiation: no  Worsened by: standing for more than 5 minutes and daily activity   Improved by: laying down and sitting    Previous Therapies:  PT/OT: Yes  HEP:  Yes  Interventions: Denies  Surgery: Denies spine surgery  Medications:   - NSAIDS:   - MSK Relaxants:   - TCAs:   - SNRIs:   - Topicals:   - Anticonvulsants:  - Opioids:     Current Pain Medications:  1.      Review of Systems:  Review of Systems   Constitutional: Negative for chills and fever.   HENT: Negative for nosebleeds.    Eyes: Negative for blurred vision and pain.   Respiratory: Negative for hemoptysis.    Cardiovascular: Negative for chest pain and palpitations.   Gastrointestinal: Negative for heartburn, nausea and vomiting.   Genitourinary: Negative for dysuria and hematuria.   Musculoskeletal: Positive for back pain. Negative for myalgias.   Skin: Negative for rash.   Neurological: Positive for tingling. Negative for focal weakness, seizures, loss of consciousness and weakness.   Endo/Heme/Allergies: Does not bruise/bleed easily.   Psychiatric/Behavioral: Negative for hallucinations.       History:    Current Outpatient Medications:     amLODIPine (NORVASC) 10 MG tablet, Take 1 tablet (10 mg total) by mouth once daily., Disp: 90 tablet, Rfl: 3    aspirin (ECOTRIN) 81 MG EC tablet, Take 1 tablet (81 mg total) by  mouth once daily., Disp: , Rfl: 0    benazepril (LOTENSIN) 20 MG tablet, Take 1 tablet (20 mg total) by mouth once daily., Disp: 90 tablet, Rfl: 3    blood sugar diagnostic (GLUCOSE BLOOD) Strp, Test glucose 1 x daily, Disp: 35 strip, Rfl: prn    carbidopa-levodopa  mg (SINEMET)  mg per tablet, Take 0.5 tablets by mouth 3 (three) times daily., Disp: 135 tablet, Rfl: 3    donepezil (ARICEPT) 5 MG tablet, Take 1 tablet (5 mg total) by mouth every evening., Disp: 90 tablet, Rfl: 3    fish oil-omega-3 fatty acids 300-1,000 mg capsule, Take 2 g by mouth once daily., Disp: , Rfl:     glimepiride (AMARYL) 2 MG tablet, TAKE 1 TABLET(2 MG) BY MOUTH DAILY WITH BREAKFAST, Disp: 90 tablet, Rfl: 1    GLUC.METER,DIS.P-LOADED STRIPS (GLUCOSE METER, DISP & STRIPS) Kit, Check glucose once per day, Disp: 1 kit, Rfl: prn    GLUCOSAMINE HCL (GLUCOSAMINE, BULK, MISC), by Misc.(Non-Drug; Combo Route) route., Disp: , Rfl:     hydroCHLOROthiazide (HYDRODIURIL) 12.5 MG Tab, Take 1 tablet (12.5 mg total) by mouth once daily., Disp: 90 tablet, Rfl: 3    L. RHAMNOSUS GG/INULIN (CULTURELLE PROBIOTICS ORAL), Take by mouth., Disp: , Rfl:     lancets Misc, As directed, Disp: 100 each, Rfl: prn    MEN'S MULTI-VITAMIN ORAL, Take by mouth., Disp: , Rfl:     metFORMIN (GLUCOPHAGE) 1000 MG tablet, Take 1 tablet (1,000 mg total) by mouth once daily., Disp: 90 tablet, Rfl: 3    pravastatin (PRAVACHOL) 20 MG tablet, Take 1 tablet (20 mg total) by mouth once daily., Disp: 90 tablet, Rfl: 3    predniSONE (DELTASONE) 5 MG tablet, Take 2 tablets (10 mg total) by mouth once daily. 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    COLONOSCOPY N/A 10/11/2016    [facility]    COLONOSCOPY N/A 9/29/2015    [facility] ENDO    COLONOSCOPY - OPERATIVE N/A 1/9/2018    [facility] OR 2ND FLR    FRACTURE SURGERY      HERNIA REPAIR      LYMPH NODE DISSECTION      Pelvic    POLYPECTOMY; endoscopic mucosal resection (EMR) N/A 1/9/2018    [facility] 2ND FLR    PROSTATECTOMY      SHOULDER ARTHROSCOPY      Right       Family History   Problem Relation Age of Onset    Leukemia Father 68    Anesthesia problems Neg Hx        Social History     Socioeconomic History    Marital status:      Spouse name: Joaquin    Number of children: 0    Years of education: Not on file    Highest education level: Not on file   Occupational History    Occupation: Retired   Social Needs    Financial resource strain: Not on file    Food insecurity:     Worry: Not on file     Inability: Not on file    Transportation needs:     Medical: Not on file     Non-medical: Not on file   Tobacco Use    Smoking status: Never Smoker    Smokeless tobacco: Never Used   Substance and Sexual Activity    Alcohol use: Yes     Alcohol/week: 21.0 oz     Types:  14 Cans of beer, 21 Shots of liquor per week     Comment: 3 drinks a day    Drug use: No    Sexual activity: Not on file   Lifestyle    Physical activity:     Days per week: Not on file     Minutes per session: Not on file    Stress: Not on file   Relationships    Social connections:     Talks on phone: Not on file     Gets together: Not on file     Attends Sabianist service: Not on file     Active member of club or organization: Not on file     Attends meetings of clubs or organizations: Not on file     Relationship status: Not on file   Other Topics Concern    Not on file   Social History Narrative    Not on file       Review of patient's allergies indicates:  No Known Allergies    Physical Exam:  Vitals:    06/19/19 1039   BP: (!) 151/76   Pulse: 73   Weight: 78.5 kg (173 lb)   PainSc:   4     General    Nursing note and vitals reviewed.  Constitutional: He is oriented to person, place, and time. He appears well-developed and well-nourished. No distress.   HENT:   Head: Normocephalic and atraumatic.   Nose: Nose normal.   Eyes: Conjunctivae and EOM are normal. Pupils are equal, round, and reactive to light. Right eye exhibits no discharge. Left eye exhibits no discharge. No scleral icterus.   Neck: No JVD present.   Cardiovascular: Intact distal pulses.    Pulmonary/Chest: Effort normal. No respiratory distress.   Abdominal: He exhibits no distension.   Neurological: He is alert and oriented to person, place, and time. Coordination normal.   Psychiatric: He has a normal mood and affect. His behavior is normal. Judgment and thought content normal.     General Musculoskeletal Exam   Gait: normal     Back (L-Spine & T-Spine) / Neck (C-Spine) Exam     Tenderness Right paramedian tenderness of the Lower L-Spine. Left paramedian tenderness of the Lower L-Spine.     Back (L-Spine & T-Spine) Range of Motion   Back extension: facet loading is positive and exacerabtes/reproduces the patient's typical low back pain     Back flexion: limited ROM but partial relief of low back pain noted.     Spinal Sensation   Right Side Sensation  L-Spine Level: normal  Left Side Sensation  L-Spine Level: normal    Other He has no scoliosis .      Muscle Strength   Right Lower Extremity   Hip Flexion: 5/5   Hip Extensors: 5/5  Quadriceps:  5/5   Hamstrin/5   Gastrocsoleus:  5/5/5  Left Lower Extremity   Hip Flexion: 5/5   Hip Extensors: 5/5  Quadriceps:  5/5   Hamstrin/5   Gastrocsoleus:  5/5/5    Reflexes     Left Side  Quadriceps:  2+  Achilles:  2+    Right Side   Quadriceps:  2+  Achilles:  2+      Imaging:  X-Ray Lumbar Spine Ap And Lateral   Order: [id number]   Status:  Final result   Visible to patient:  Yes (Patient Portal) Next appt:  Today at 10:45 AM in Pain Medicine (Jax Nelson Jr, MD) Dx:  Gait abnormality; Low back pain, non-...   Details     Reading Physician Reading Date Result Priority   Juan Glaser DO 2019       Narrative     EXAMINATION:  XR LUMBAR SPINE AP AND LATERAL    CLINICAL HISTORY:  Low back pain, >6wks conservative tx, persistent-progressive sx, surgical candidate;Low back pain, risk factors (osteoporosis or chronic steroid use or elderly);Low back pain    TECHNIQUE:  AP, lateral and spot images were performed of the lumbar spine.    COMPARISON:  2017    FINDINGS:  The vertebral bodies demonstrate a normal height.  There is a couple mm of anterolisthesis of L4 on L5.  There is moderate to severe disc space narrowing and spondylosis present at the L5-S1 level.  There is mild dextroscoliosis of the lumbar spine.  There is at least moderate bilateral facet arthropathy noted at the L4-5 and L5-S1 levels.  Vascular calcification seen involving the aorta.      Impression       1.  As above      Electronically signed by: Juan Glaser DO  Date: 2019  Time: 16:09               Labs:  BMP  Lab Results   Component Value Date     2019    K 4.2 2019     2019     CO2 27 02/18/2019    BUN 20 02/18/2019    CREATININE 1.0 02/18/2019    CALCIUM 10.0 02/18/2019    ANIONGAP 8 02/18/2019    ESTGFRAFRICA >60.0 02/18/2019    EGFRNONAA >60.0 02/18/2019     Lab Results   Component Value Date    ALT 18 02/18/2019    AST 25 02/18/2019    ALKPHOS 59 02/18/2019    BILITOT 0.8 02/18/2019       Assessment:  Jagdeep Greenberg is a 78 y.o. male with the following diagnoses based on history, exam, and imaging:    Problem List Items Addressed This Visit     Lumbar radiculopathy    Lumbar spondylosis - Primary    DDD (degenerative disc disease), lumbar    Spinal stenosis of lumbar region with neurogenic claudication        : Reviewed and consistent with medication use as prescribed.    Treatment Plan:   Procedures: Right L3-4, L4-5, L5-S1 facet injection.  Patient appears to be suffering mostly from facet arthropathy despite the MRI findings showing other pathology.  PT/OT/HEP: I plan to send him to PT once the intervention is complete  Medications: No changes recommended at this time.  Labs: reviewed and medications are appropriately dosed for current hepatorenal function.  Imaging: No additional recommended at this time.    Follow Up: RTC 2-3 weeks    Jax Nelson Jr, MD  Interventional Pain Medicine / Anesthesiology    Disclaimer: This note was partly generated using dictation software which may occasionally result in transcription errors.

## 2019-06-19 NOTE — LETTER
June 25, 2019      Ernie Harden MD  [address]  [address]  [place] [address]           [facility]  [address]  [place] [address]  Phone: [phone]          Patient: Jagdeep Greenberg   MR Number: [id number]   YOB: 1940   Date of Visit: 6/19/2019       Dear Dr. Ernie Harden:    Thank you for referring Jagdeep Greenberg to me for evaluation. Attached you will find relevant portions of my assessment and plan of care.    If you have questions, please do not hesitate to call me. I look forward to following Jagdeep Greenberg along with you.    Sincerely,    Jax Nelson Jr., MD    Enclosure  CC:  No Recipients    If you would like to receive this communication electronically, please contact [email or web address] or [phone] to request more information on [employer] Link access.    For providers and/or their staff who would like to refer a patient to [facility], please contact us through our one-stop-shop provider referral line, [facility] , at [phone].    If you feel you have received this communication in error or would no longer like to receive these types of communications, please e-mail [email or web address]

## 2019-06-25 PROBLEM — M54.16 LUMBAR RADICULOPATHY: Status: ACTIVE | Noted: 2019-06-25

## 2019-06-25 PROBLEM — M47.816 LUMBAR SPONDYLOSIS: Status: ACTIVE | Noted: 2019-06-25

## 2019-06-25 PROBLEM — M51.36 DDD (DEGENERATIVE DISC DISEASE), LUMBAR: Status: ACTIVE | Noted: 2019-06-25

## 2019-06-25 PROBLEM — M48.062 SPINAL STENOSIS OF LUMBAR REGION WITH NEUROGENIC CLAUDICATION: Status: ACTIVE | Noted: 2019-06-25

## 2019-06-27 ENCOUNTER — PATIENT MESSAGE (OUTPATIENT)
Dept: NEUROLOGY | Facility: CLINIC | Age: 79
End: 2019-06-27

## 2019-07-09 RX ORDER — GLIMEPIRIDE 2 MG/1
TABLET ORAL
Qty: 90 TABLET | Refills: 1 | Status: SHIPPED | OUTPATIENT
Start: 2019-07-09 | End: 2020-01-16

## 2019-07-15 NOTE — DISCHARGE INSTRUCTIONS
Home Care Instructions Pain Management:    1.  DIET:    You may resume your normal diet today.    2.  BATHING:    You may shower with luke warm water.    3.  DRESSING:    You may remove your bandage today.    4.  ACTIVITY LEVEL:      You may resume your normal activities 24 hours after your procedure.    5.  MEDICATIONS:    You may resume your normal medications today.    6.  SPECIAL INSTRUCTIONS:    No heat to the injection site for 24 hours including bath or shower, heating pad, moist heat or hot tubs.    Use an ice pack to the injection site for any pain or discomfort.  Apply ice packs for 20 minute intervals as needed.    If you have received any sedatives by mouth today, you can not drive for 12 hours.    If you have received sedation through an IV, you can not drive for 24 hours.    PLEASE CALL YOUR DOCTOR FOR THE FOLLOWIN.  Redness or swelling around the injection site.  2.  Fever of 101 degrees.  3.  Drainage (pus) from the injection site.  4.  For any continuous bleeding (some dried blood over the incision is normal.)    FOR EMERGENCIES:    If any unusual problems or difficulties occur during clinic hours, call [phone] or dial [phone].    Follow up with with your physician in 2-3 weeks.

## 2019-07-22 ENCOUNTER — PATIENT MESSAGE (OUTPATIENT)
Dept: PAIN MEDICINE | Facility: HOSPITAL | Age: 79
End: 2019-07-22

## 2019-07-22 ENCOUNTER — TELEPHONE (OUTPATIENT)
Dept: PAIN MEDICINE | Facility: CLINIC | Age: 79
End: 2019-07-22

## 2019-07-23 ENCOUNTER — HOSPITAL ENCOUNTER (OUTPATIENT)
Facility: HOSPITAL | Age: 79
Discharge: HOME | End: 2019-07-23
Attending: PAIN MEDICINE | Admitting: PAIN MEDICINE
Payer: MEDICARE

## 2019-07-23 VITALS
OXYGEN SATURATION: 97 % | BODY MASS INDEX: 22.46 KG/M2 | TEMPERATURE: 98 F | RESPIRATION RATE: 17 BRPM | DIASTOLIC BLOOD PRESSURE: 60 MMHG | SYSTOLIC BLOOD PRESSURE: 138 MMHG | WEIGHT: 175 LBS | HEART RATE: 60 BPM | HEIGHT: 74 IN

## 2019-07-23 DIAGNOSIS — M47.816 LUMBAR SPONDYLOSIS: Primary | ICD-10-CM

## 2019-07-23 DIAGNOSIS — G89.29 CHRONIC PAIN: ICD-10-CM

## 2019-07-23 LAB — POCT GLUCOSE: 159 MG/DL (ref 70–110)

## 2019-07-23 PROCEDURE — 64493 INJ PARAVERT F JNT L/S 1 LEV: CPT | Mod: HCNC,RT,, | Performed by: PAIN MEDICINE

## 2019-07-23 PROCEDURE — 64495 INJ PARAVERT F JNT L/S 3 LEV: CPT | Mod: HCNC,RT,, | Performed by: PAIN MEDICINE

## 2019-07-23 PROCEDURE — 64493 INJ PARAVERT F JNT L/S 1 LEV: CPT | Mod: HCNC | Performed by: PAIN MEDICINE

## 2019-07-23 PROCEDURE — 63600175 PHARM REV CODE 636 W HCPCS: Mod: HCNC | Performed by: PAIN MEDICINE

## 2019-07-23 PROCEDURE — 99152 MOD SED SAME PHYS/QHP 5/>YRS: CPT | Mod: HCNC,,, | Performed by: PAIN MEDICINE

## 2019-07-23 PROCEDURE — 64493 PR INJ DX/THER AGNT PARAVERT FACET JOINT,IMG GUIDE,LUMBAR/SAC,1ST LVL: ICD-10-PCS | Mod: HCNC,RT,, | Performed by: PAIN MEDICINE

## 2019-07-23 PROCEDURE — 64494 INJ PARAVERT F JNT L/S 2 LEV: CPT | Mod: HCNC,RT,, | Performed by: PAIN MEDICINE

## 2019-07-23 PROCEDURE — 99152 MOD SED SAME PHYS/QHP 5/>YRS: CPT | Mod: HCNC | Performed by: PAIN MEDICINE

## 2019-07-23 PROCEDURE — 64494 PR INJ DX/THER AGNT PARAVERT FACET JOINT,IMG GUIDE,LUMBAR/SAC, 2ND LEVEL: ICD-10-PCS | Mod: HCNC,RT,, | Performed by: PAIN MEDICINE

## 2019-07-23 PROCEDURE — 99152 PR MOD CONSCIOUS SEDATION, SAME PHYS, 5+ YRS, FIRST 15 MIN: ICD-10-PCS | Mod: HCNC,,, | Performed by: PAIN MEDICINE

## 2019-07-23 PROCEDURE — S0020 INJECTION, BUPIVICAINE HYDRO: HCPCS | Mod: HCNC | Performed by: PAIN MEDICINE

## 2019-07-23 PROCEDURE — 64494 INJ PARAVERT F JNT L/S 2 LEV: CPT | Mod: HCNC | Performed by: PAIN MEDICINE

## 2019-07-23 PROCEDURE — 25500020 PHARM REV CODE 255: Mod: HCNC | Performed by: PAIN MEDICINE

## 2019-07-23 PROCEDURE — 64495 INJ PARAVERT F JNT L/S 3 LEV: CPT | Mod: HCNC | Performed by: PAIN MEDICINE

## 2019-07-23 PROCEDURE — 25000003 PHARM REV CODE 250: Mod: HCNC | Performed by: PAIN MEDICINE

## 2019-07-23 PROCEDURE — 64495 PR INJ DX/THER AGNT PARAVERT FACET JOINT,IMG GUIDE,LUMBAR/SAC, ADD LEVEL: ICD-10-PCS | Mod: HCNC,RT,, | Performed by: PAIN MEDICINE

## 2019-07-23 RX ORDER — LIDOCAINE HYDROCHLORIDE 10 MG/ML
1 INJECTION, SOLUTION EPIDURAL; INFILTRATION; INTRACAUDAL; PERINEURAL ONCE
Status: COMPLETED | OUTPATIENT
Start: 2019-07-23 | End: 2019-07-23

## 2019-07-23 RX ORDER — SODIUM CHLORIDE 9 MG/ML
500 INJECTION, SOLUTION INTRAVENOUS CONTINUOUS
Status: ACTIVE | OUTPATIENT
Start: 2019-07-23 | End: 2019-07-24

## 2019-07-23 RX ORDER — BUPIVACAINE HYDROCHLORIDE 5 MG/ML
INJECTION, SOLUTION EPIDURAL; INTRACAUDAL
Status: DISCONTINUED | OUTPATIENT
Start: 2019-07-23 | End: 2019-07-23 | Stop reason: HOSPADM

## 2019-07-23 RX ORDER — INDOMETHACIN 25 MG/1
CAPSULE ORAL
Status: DISCONTINUED | OUTPATIENT
Start: 2019-07-23 | End: 2019-07-23 | Stop reason: HOSPADM

## 2019-07-23 RX ORDER — METHYLPREDNISOLONE ACETATE 40 MG/ML
INJECTION, SUSPENSION INTRA-ARTICULAR; INTRALESIONAL; INTRAMUSCULAR; SOFT TISSUE
Status: DISCONTINUED | OUTPATIENT
Start: 2019-07-23 | End: 2019-07-23 | Stop reason: HOSPADM

## 2019-07-23 RX ORDER — MIDAZOLAM HYDROCHLORIDE 1 MG/ML
INJECTION, SOLUTION INTRAMUSCULAR; INTRAVENOUS
Status: DISCONTINUED | OUTPATIENT
Start: 2019-07-23 | End: 2019-07-23 | Stop reason: HOSPADM

## 2019-07-23 RX ORDER — FENTANYL CITRATE 50 UG/ML
INJECTION, SOLUTION INTRAMUSCULAR; INTRAVENOUS
Status: DISCONTINUED | OUTPATIENT
Start: 2019-07-23 | End: 2019-07-23 | Stop reason: HOSPADM

## 2019-07-23 RX ADMIN — SODIUM CHLORIDE 500 ML: 0.9 INJECTION, SOLUTION INTRAVENOUS at 09:07

## 2019-07-23 NOTE — OP NOTE
"Procedure Note    Pre-operative diagnosis: Lumbar Spondylosis  Post-operative diagnosis: Lumbar Spondylosis  Procedure Date: 07/23/2019  Procedure:  (1) RIGHT L3-4, L4-5 and L5-S1 Lumbar Facet Injection    (2) Fluoroscopy for needle guidance    (3) IV Moderate Sedation (Midazolam 2 mg, Fentanyl 25 mcg)    Procedure in detail:  Informed consent obtained after explaining the procedure and potential complications including local discomfort, infection, headache, temporary or permanent weakness and/or numbness of one or both legs, temporary or permanent paraplegia, heart attack and stroke. All questions were answered.      Patient was taken to the procedure room and placed in prone position.  Time out was performed.  Patient's Lumbar area was prepped and draped in a sterile manner.  Ipsilateral oblique fluoroscopy were used to identify and marlys the right L4-5 and L5-S1 facets.  Skin and tissues overlying the targeted points were anesthetized with Lidocaine 1% using a 25G 1.25" needle.  Then, under fluoroscopic guidance, a 22 G 3.5 inch spinal needle, was advanced through the anesthetized tissues toward the targeted points corresponding to the facets joints.    After heme-negative aspiration, 0.2 mL of contrast was administered demonstrating appropriate spread for medial branch coverage.  Next, 1 mL from a 3 mL mixture of 0.5% bupivacaine (2 mL) and Depomedrol 40 mg (1 mL) was injected at each location.  Needles were removed with lidocaine flush.    The procedure was repeated on the left side with similar findings.  Needle placement and contrast spread were consistent with successful facet injection.    Needle was removed with flush and bandaged placed over site of needle insertion.      Estimated Blood Loss: None    Disposition: The patient tolerated the procedure without complaint and was transported to the recovery room in stable condition.    Follow-up: Return for RFA if appropriate      Jax Nelson Jr, " MD  Interventional Pain Medicine / Anesthesiology

## 2019-07-23 NOTE — PROGRESS NOTES
MD Arrival Time:1042  Sedation Start Time:1043  Sedation End Time:1043  MD Departure Time:1053

## 2019-07-23 NOTE — DISCHARGE SUMMARY
[facility]  Discharge Note  Short Stay     Admit Date: 7/23/2019    Discharge Date: 7/23/2019     Attending Physician: Jax Nelson Jr, MD    Diagnoses:  Active Hospital Problems    Diagnosis  POA    *Hypertension associated with diabetes [E11.59, I10]  Yes    Chronic pain [G89.29]  Yes      Resolved Hospital Problems   No resolved problems to display.     Discharged Condition: Good     Hospital Course: Patient was admitted for an outpatient interventional pain management procedure and tolerated the procedure well with no complications.     Final Diagnoses: Same as principal problem.     Disposition: Home or Self Care     Follow up/Patient Instructions:    Follow-up Information     Jax Nelson Jr, MD. Go in 2 weeks.    Specialty:  Pain Medicine  Why:  Post-procedural Follow Up As Scheduled  Contact information:  [address]  [address]  [place] [address] [phone]                   Reconciled Medications:     Medication List      CONTINUE taking these medications    amLODIPine 10 MG tablet  Commonly known as:  NORVASC  Take 1 tablet (10 mg total) by mouth once daily.     aspirin 81 MG EC tablet  Commonly known as:  ECOTRIN  Take 1 tablet (81 mg total) by mouth once daily.     benazepril 20 MG tablet  Commonly known as:  LOTENSIN  Take 1 tablet (20 mg total) by mouth once daily.     blood sugar diagnostic Strp  Commonly known as:  BLOOD GLUCOSE TEST  Test glucose 1 x daily     carbidopa-levodopa  mg  mg per tablet  Commonly known as:  SINEMET  Take 0.5 tablets by mouth 3 (three) times daily.     CULTURELLE PROBIOTICS ORAL  Take by mouth.     donepezil 5 MG tablet  Commonly known as:  ARICEPT  Take 1 tablet (5 mg total) by mouth every evening.     fish oil-omega-3 fatty acids 300-1,000 mg capsule  Take 2 g by mouth once daily.     glimepiride 2 MG tablet  Commonly known as:  AMARYL  TAKE 1 TABLET(2 MG) BY MOUTH DAILY WITH BREAKFAST     GLUCOSAMINE (BULK) MISC  by  Misc.(Non-Drug; Combo Route) route.     glucose meter,disposabl,strips Kit  Check glucose once per day     hydroCHLOROthiazide 12.5 MG Tab  Commonly known as:  HYDRODIURIL  Take 1 tablet (12.5 mg total) by mouth once daily.     lancets Misc  As directed     MEN'S MULTI-VITAMIN ORAL  Take by mouth.     metFORMIN 1000 MG tablet  Commonly known as:  GLUCOPHAGE  Take 1 tablet (1,000 mg total) by mouth once daily.     pravastatin 20 MG tablet  Commonly known as:  PRAVACHOL  Take 1 tablet (20 mg total) by mouth once daily.     predniSONE 5 MG tablet  Commonly known as:  DELTASONE  Take 2 tablets (10 mg total) by mouth once daily. Take 3 tablets for the first 2 days     turmeric root extract 500 mg Cap  Take by mouth.           Discharge Procedure Orders (must include Diet, Follow-up, Activity)   Call MD for:  temperature >100.4     Call MD for:  severe uncontrolled pain     Call MD for:  redness, tenderness, or signs of infection (pain, swelling, redness, odor or green/yellow discharge around incision site)     Call MD for:  difficulty breathing or increased cough     Call MD for:  severe persistent headache     Call MD for:  worsening rash     Remove dressing in 24 hours       Jax Nelson Jr, MD  Interventional Pain Medicine / Anesthesiology

## 2019-07-23 NOTE — PLAN OF CARE
Problem: Adult Inpatient Plan of Care  Goal: Plan of Care Review  Outcome: Outcome(s) achieved Date Met: 07/23/19  Procedure complete, tolerated well. Discharge instructions given and explained to patient. Verbalized understanding. Patient discharged home with spouse via private vehicle. Safety maintained.

## 2019-07-23 NOTE — H&P
[facility]  History & Physical - Short Stay  Pain Management           SUBJECTIVE:     Procedure: Procedure(s) (LRB):  INJECTION, FACET JOINT--Right L3,4,5,S1 (Right)    Chief Complaint/Reason for Admission:  Lumbar spondylosis [M47.816]    PTA Medications   Medication Sig    amLODIPine (NORVASC) 10 MG tablet Take 1 tablet (10 mg total) by mouth once daily.    aspirin (ECOTRIN) 81 MG EC tablet Take 1 tablet (81 mg total) by mouth once daily.    benazepril (LOTENSIN) 20 MG tablet Take 1 tablet (20 mg total) by mouth once daily.    blood sugar diagnostic (GLUCOSE BLOOD) Strp Test glucose 1 x daily    carbidopa-levodopa  mg (SINEMET)  mg per tablet Take 0.5 tablets by mouth 3 (three) times daily.    donepezil (ARICEPT) 5 MG tablet Take 1 tablet (5 mg total) by mouth every evening.    fish oil-omega-3 fatty acids 300-1,000 mg capsule Take 2 g by mouth once daily.    glimepiride (AMARYL) 2 MG tablet TAKE 1 TABLET(2 MG) BY MOUTH DAILY WITH BREAKFAST    hydroCHLOROthiazide (HYDRODIURIL) 12.5 MG Tab Take 1 tablet (12.5 mg total) by mouth once daily.    L. RHAMNOSUS GG/INULIN (CULTURELLE PROBIOTICS ORAL) Take by mouth.    MEN'S MULTI-VITAMIN ORAL Take by mouth.    metFORMIN (GLUCOPHAGE) 1000 MG tablet Take 1 tablet (1,000 mg total) by mouth once daily.    GLUC.METER,DIS.P-LOADED STRIPS (GLUCOSE METER, DISP & STRIPS) Kit Check glucose once per day    GLUCOSAMINE HCL (GLUCOSAMINE, BULK, MISC) by Misc.(Non-Drug; Combo Route) route.    lancets Misc As directed    pravastatin (PRAVACHOL) 20 MG tablet Take 1 tablet (20 mg total) by mouth once daily.    predniSONE (DELTASONE) 5 MG tablet Take 2 tablets (10 mg total) by mouth once daily. Take 3 tablets for the first 2 days    turmeric root extract 500 mg Cap Take by mouth.       Review of patient's allergies indicates:  No Known Allergies    Past Medical History:   Diagnosis Date    Adenomatous polyp of colon     Arthritis     Back  pain     Carotid artery plaque     Less than 50%    Chronic kidney disease, stage 3     Diabetes mellitus, type 2     Diabetes with neurologic complications     Erectile dysfunction following radical prostatectomy     Fractures     Hyperlipidemia LDL goal < 100     Hypertension goal BP (blood pressure) < 130/80     Lacunar stroke 8/17/2017    MCI (mild cognitive impairment)     Parkinson disease     Parkinsonism 4/11/2017    Prostate cancer 2001    Renal manifestation of secondary diabetes mellitus     Stroke      Past Surgical History:   Procedure Laterality Date    APPENDECTOMY      COLONOSCOPY  In 3/2/2010    Repeat 5 years    COLONOSCOPY N/A 8/23/2018    [facility] (4TH F[facility]    COLONOSCOPY N/A 10/24/2017    [facility]    COLONOSCOPY N/A 10/11/2016    [facility]    COLONOSCOPY N/A 9/29/2015    [facility]    COLONOSCOPY - OPERATIVE N/A 1/9/2018    [facility] OR 2ND FLR    FRACTURE SURGERY      HERNIA REPAIR      LYMPH NODE DISSECTION      Pelvic    POLYPECTOMY; endoscopic mucosal resection (EMR) N/A 1/9/2018    [facility] OR 2ND FLR    PROSTATECTOMY      SHOULDER ARTHROSCOPY      Right     Family History   Problem Relation Age of Onset    Leukemia Father 68    Anesthesia problems Neg Hx      Social History     Tobacco Use    Smoking status: Never Smoker    Smokeless tobacco: Never Used   Substance Use Topics    Alcohol use: Yes     Alcohol/week: 21.0 oz     Types: 14 Cans of beer, 21 Shots of liquor per week     Comment: 3 drinks a day    Drug use: No        Review of Systems:  Review of Systems   Constitutional: Negative for chills and fever.   HENT: Negative for nosebleeds.    Eyes: Negative for blurred vision.   Respiratory: Negative for hemoptysis.    Cardiovascular:  Negative for chest pain and palpitations.   Gastrointestinal: Negative for heartburn and vomiting.   Genitourinary: Negative for hematuria.   Musculoskeletal: Negative for myalgias.   Skin: Negative for rash.   Neurological: Negative for seizures and loss of consciousness.   Endo/Heme/Allergies: Does not bruise/bleed easily.   Psychiatric/Behavioral: Negative for hallucinations.       OBJECTIVE:     Vital Signs (Most Recent):  Temp: 97.9 °F (36.6 °C) (07/23/19 0929)  Pulse: 74 (07/23/19 0929)  Resp: 16 (07/23/19 0929)  BP: (!) 157/70 (07/23/19 0929)  SpO2: 98 % (07/23/19 0929)    Physical Exam:  General appearance - alert, well appearing, and in no distress  Mental status - AOx3  Eyes - pupils equal and reactive, extraocular eye movements intact  Heart - normal rate, regular rhythm, normal S1, S2, no murmurs, rubs, clicks or gallops  Chest - clear to auscultation, no wheezes, rales or rhonchi, symmetric air entry  Abdomen - soft, nontender, nondistended, no masses or organomegaly  Neurological - alert, oriented, normal speech, no focal findings or movement disorder noted  Extremities - peripheral pulses normal, no pedal edema, no clubbing or cyanosis  Back - ++ Facet loading      ASSESSMENT/PLAN:     Active Hospital Problems    Diagnosis  POA    Chronic pain [G89.29]  Yes      Resolved Hospital Problems   No resolved problems to display.        The risks and benefits of this intervention, and alternative therapies were discussed with the patient.  The discussion of risks included infection, bleeding, need for additional procedures or surgery, nerve damage, paralysis, adverse medication reaction(s), stroke, and/or death.  Questions regarding the procedure, risks, expected outcome, and possible side effects were solicited and answered to the patient's satisfaction.  Jagdeep wishes to proceed with the injection.  Verbal and written consent were verified.      Proceed with intervention as scheduled.      Jax Gunderson  [place] MD  Interventional Pain Medicine / Anesthesiology

## 2019-07-23 NOTE — PLAN OF CARE
Problem: Adult Inpatient Plan of Care  Goal: Patient-Specific Goal (Individualization)  Outcome: Outcome(s) achieved Date Met: 07/23/19  Procedure complete, tolerated well. Discharge instructions given and explained to patient. Verbalized understanding. Patient discharged home with spouse via private vehicle. Safety maintained.

## 2019-07-24 ENCOUNTER — LAB VISIT (OUTPATIENT)
Dept: LAB | Facility: HOSPITAL | Age: 79
End: 2019-07-24
Attending: PHYSICIAN ASSISTANT
Payer: MEDICARE

## 2019-07-24 ENCOUNTER — TELEPHONE (OUTPATIENT)
Dept: RHEUMATOLOGY | Facility: CLINIC | Age: 79
End: 2019-07-24

## 2019-07-24 DIAGNOSIS — Z79.52 LONG TERM CURRENT USE OF SYSTEMIC STEROIDS: ICD-10-CM

## 2019-07-24 DIAGNOSIS — N18.30 CHRONIC KIDNEY DISEASE, STAGE 3: ICD-10-CM

## 2019-07-24 DIAGNOSIS — M35.3 PMR (POLYMYALGIA RHEUMATICA): ICD-10-CM

## 2019-07-24 LAB
ALBUMIN SERPL BCP-MCNC: 3.9 G/DL (ref 3.5–5.2)
ALP SERPL-CCNC: 60 U/L (ref 55–135)
ALT SERPL W/O P-5'-P-CCNC: 17 U/L (ref 10–44)
ANION GAP SERPL CALC-SCNC: 8 MMOL/L (ref 8–16)
AST SERPL-CCNC: 16 U/L (ref 10–40)
BASOPHILS # BLD AUTO: 0.02 K/UL (ref 0–0.2)
BASOPHILS NFR BLD: 0.2 % (ref 0–1.9)
BILIRUB SERPL-MCNC: 0.6 MG/DL (ref 0.1–1)
BUN SERPL-MCNC: 23 MG/DL (ref 8–23)
CALCIUM SERPL-MCNC: 10.6 MG/DL (ref 8.7–10.5)
CHLORIDE SERPL-SCNC: 102 MMOL/L (ref 95–110)
CO2 SERPL-SCNC: 27 MMOL/L (ref 23–29)
CREAT SERPL-MCNC: 1.2 MG/DL (ref 0.5–1.4)
CRP SERPL-MCNC: 11.1 MG/L (ref 0–8.2)
DIFFERENTIAL METHOD: ABNORMAL
EOSINOPHIL # BLD AUTO: 0 K/UL (ref 0–0.5)
EOSINOPHIL NFR BLD: 0.4 % (ref 0–8)
ERYTHROCYTE [DISTWIDTH] IN BLOOD BY AUTOMATED COUNT: 12.8 % (ref 11.5–14.5)
ERYTHROCYTE [SEDIMENTATION RATE] IN BLOOD BY WESTERGREN METHOD: 11 MM/HR (ref 0–10)
EST. GFR  (AFRICAN AMERICAN): >60 ML/MIN/1.73 M^2
EST. GFR  (NON AFRICAN AMERICAN): 57.6 ML/MIN/1.73 M^2
GLUCOSE SERPL-MCNC: 328 MG/DL (ref 70–110)
HCT VFR BLD AUTO: 40.2 % (ref 40–54)
HGB BLD-MCNC: 13.9 G/DL (ref 14–18)
LYMPHOCYTES # BLD AUTO: 0.8 K/UL (ref 1–4.8)
LYMPHOCYTES NFR BLD: 9.2 % (ref 18–48)
MCH RBC QN AUTO: 32.4 PG (ref 27–31)
MCHC RBC AUTO-ENTMCNC: 34.6 G/DL (ref 32–36)
MCV RBC AUTO: 94 FL (ref 82–98)
MONOCYTES # BLD AUTO: 0.3 K/UL (ref 0.3–1)
MONOCYTES NFR BLD: 3.4 % (ref 4–15)
NEUTROPHILS # BLD AUTO: 7 K/UL (ref 1.8–7.7)
NEUTROPHILS NFR BLD: 86.8 % (ref 38–73)
PLATELET # BLD AUTO: 260 K/UL (ref 150–350)
PMV BLD AUTO: 10.6 FL (ref 9.2–12.9)
POTASSIUM SERPL-SCNC: 4.4 MMOL/L (ref 3.5–5.1)
PROT SERPL-MCNC: 6.9 G/DL (ref 6–8.4)
RBC # BLD AUTO: 4.29 M/UL (ref 4.6–6.2)
SODIUM SERPL-SCNC: 137 MMOL/L (ref 136–145)
WBC # BLD AUTO: 8.12 K/UL (ref 3.9–12.7)

## 2019-07-24 PROCEDURE — 85025 COMPLETE CBC W/AUTO DIFF WBC: CPT | Mod: HCNC,PO

## 2019-07-24 PROCEDURE — 86140 C-REACTIVE PROTEIN: CPT | Mod: HCNC

## 2019-07-24 PROCEDURE — 80053 COMPREHEN METABOLIC PANEL: CPT | Mod: HCNC

## 2019-07-24 PROCEDURE — 36415 COLL VENOUS BLD VENIPUNCTURE: CPT | Mod: HCNC,PO

## 2019-07-24 PROCEDURE — 85651 RBC SED RATE NONAUTOMATED: CPT | Mod: HCNC,PO

## 2019-07-25 ENCOUNTER — OFFICE VISIT (OUTPATIENT)
Dept: RHEUMATOLOGY | Facility: CLINIC | Age: 79
End: 2019-07-25
Payer: MEDICARE

## 2019-07-25 VITALS — DIASTOLIC BLOOD PRESSURE: 65 MMHG | SYSTOLIC BLOOD PRESSURE: 125 MMHG | BODY MASS INDEX: 22.28 KG/M2 | WEIGHT: 173.5 LBS

## 2019-07-25 DIAGNOSIS — N18.30 CHRONIC KIDNEY DISEASE, STAGE 3: ICD-10-CM

## 2019-07-25 DIAGNOSIS — M35.3 PMR (POLYMYALGIA RHEUMATICA): Primary | ICD-10-CM

## 2019-07-25 DIAGNOSIS — M72.0 DUPUYTREN'S CONTRACTURE OF BOTH HANDS: ICD-10-CM

## 2019-07-25 DIAGNOSIS — M15.9 PRIMARY OSTEOARTHRITIS INVOLVING MULTIPLE JOINTS: ICD-10-CM

## 2019-07-25 DIAGNOSIS — G20.C PRIMARY PARKINSONISM: ICD-10-CM

## 2019-07-25 DIAGNOSIS — R26.9 GAIT ABNORMALITY: ICD-10-CM

## 2019-07-25 DIAGNOSIS — M48.062 SPINAL STENOSIS OF LUMBAR REGION WITH NEUROGENIC CLAUDICATION: ICD-10-CM

## 2019-07-25 PROCEDURE — 3078F PR MOST RECENT DIASTOLIC BLOOD PRESSURE < 80 MM HG: ICD-10-PCS | Mod: HCNC,CPTII,S$GLB, | Performed by: PHYSICIAN ASSISTANT

## 2019-07-25 PROCEDURE — 99214 OFFICE O/P EST MOD 30 MIN: CPT | Mod: HCNC,S$GLB,, | Performed by: PHYSICIAN ASSISTANT

## 2019-07-25 PROCEDURE — 3078F DIAST BP <80 MM HG: CPT | Mod: HCNC,CPTII,S$GLB, | Performed by: PHYSICIAN ASSISTANT

## 2019-07-25 PROCEDURE — 1101F PT FALLS ASSESS-DOCD LE1/YR: CPT | Mod: HCNC,CPTII,S$GLB, | Performed by: PHYSICIAN ASSISTANT

## 2019-07-25 PROCEDURE — 1101F PR PT FALLS ASSESS DOC 0-1 FALLS W/OUT INJ PAST YR: ICD-10-PCS | Mod: HCNC,CPTII,S$GLB, | Performed by: PHYSICIAN ASSISTANT

## 2019-07-25 PROCEDURE — 99999 PR PBB SHADOW E&M-EST. PATIENT-LVL III: ICD-10-PCS | Mod: PBBFAC,HCNC,, | Performed by: PHYSICIAN ASSISTANT

## 2019-07-25 PROCEDURE — 3074F PR MOST RECENT SYSTOLIC BLOOD PRESSURE < 130 MM HG: ICD-10-PCS | Mod: HCNC,CPTII,S$GLB, | Performed by: PHYSICIAN ASSISTANT

## 2019-07-25 PROCEDURE — 3074F SYST BP LT 130 MM HG: CPT | Mod: HCNC,CPTII,S$GLB, | Performed by: PHYSICIAN ASSISTANT

## 2019-07-25 PROCEDURE — 99214 PR OFFICE/OUTPT VISIT, EST, LEVL IV, 30-39 MIN: ICD-10-PCS | Mod: HCNC,S$GLB,, | Performed by: PHYSICIAN ASSISTANT

## 2019-07-25 PROCEDURE — 99999 PR PBB SHADOW E&M-EST. PATIENT-LVL III: CPT | Mod: PBBFAC,HCNC,, | Performed by: PHYSICIAN ASSISTANT

## 2019-07-25 RX ORDER — PREDNISONE 2.5 MG/1
TABLET ORAL
Qty: 270 TABLET | Refills: 3 | Status: SHIPPED | OUTPATIENT
Start: 2019-07-25 | End: 2019-09-30

## 2019-07-25 NOTE — PROGRESS NOTES
Subjective:       Patient ID: Jagdeep Greenberg is a 78 y.o. male.    Chief Complaint: PMR and Osteoarthritis    Jagdeep is here for rheum follow up. PMR and OA    Had some lumbar stenosis with radicular pain - had L3-S1 facet injections done a few days ago. So far pain is much better. Had weakness, will see if this has any affect on that. He has underlying Parkinson dx 2 yrs ago; see dr lang at [facility] location; on sinemet and for memory aricept  No recent med changes.  Last lumbar x-ray done in 2017 showed findings: There is mild dextroscoliotic curvature of the lumbar spine.  Vertebral body heights are well-maintained.  No spondylolisthesis demonstrated.  There is moderate disc height loss at L5-S1 with associated osteophyte production. Posterior elements appear grossly intact. No acute fractures or subluxations are demonstrated.  The remaining visualized osseous and soft tissue structures demonstrate no appreciable abnormality    For his PMR now on Prednisone dose right now is on 5 mg bid;   We have weaned down and off a few times in the past but had recurrence of symptoms. Last month had exacerbation so pred up to 5 mg bid. Feeling good. No joint pain or stiffness. Today  pain level rated at 010.  Unable to take nonsteroidals because of chronic kidney disease.  In the past uses Tylenol when necessary for pain.      Still taking turmeric  1000 mg bid, glucosamine,  and fish oil as well.    Dx with Parkinson's by neurology-  Now on sinemet tid, doing well just a little gait/imbalane issue. Some mild lower ext weakness.     dexa normal 2014- repeated today 6/6/18- normal     Joint Pain   Associated symptoms include weakness. Pertinent negatives include no abdominal pain, arthralgias, chest pain, chills, congestion, coughing, fatigue, fever, joint swelling, myalgias, nausea, neck pain, numbness, rash or vomiting.   Osteoarthritis   Associated symptoms include weakness. Pertinent negatives include no  abdominal pain, arthralgias, chest pain, chills, congestion, coughing, fatigue, fever, joint swelling, myalgias, nausea, neck pain, numbness, rash or vomiting.       Review of Systems   Constitutional: Negative.  Negative for chills, fatigue and fever.   HENT: Negative.  Negative for congestion, mouth sores and trouble swallowing.    Eyes: Negative.  Negative for photophobia, redness and visual disturbance.   Respiratory: Negative.  Negative for cough, shortness of breath and wheezing.    Cardiovascular: Negative.  Negative for chest pain and leg swelling.   Gastrointestinal: Negative.  Negative for abdominal distention, abdominal pain, diarrhea, nausea and vomiting.   Genitourinary: Negative.  Negative for dysuria, flank pain, frequency and urgency.   Musculoskeletal: Positive for gait problem. Negative for arthralgias, back pain, joint swelling, myalgias, neck pain and neck stiffness.   Skin: Negative.  Negative for rash.   Neurological: Positive for weakness. Negative for dizziness and numbness.         Objective:   /65 (BP Location: Left arm, Patient Position: Sitting, BP Method: Medium (Automatic))   Wt 78.7 kg (173 lb 8 oz)   BMI 22.28 kg/m²      Physical Exam   Constitutional: He is oriented to person, place, and time and well-developed, well-nourished, and in no distress. No distress.   HENT:   Head: Normocephalic and atraumatic.   Right Ear: External ear normal.   Left Ear: External ear normal.   Mouth/Throat: No oropharyngeal exudate.   Eyes: Conjunctivae and EOM are normal. Pupils are equal, round, and reactive to light. No scleral icterus.   Neck: Neck supple. No thyromegaly present.   Cardiovascular: Normal rate, regular rhythm and normal heart sounds.    No murmur heard.  Pulmonary/Chest: Effort normal and breath sounds normal. No respiratory distress.   Abdominal: Soft. Bowel sounds are normal.       Right Side Rheumatological Exam     Examination finds the shoulder, elbow, wrist, knee, 1st  PIP, 1st MCP, 2nd PIP, 2nd MCP, 3rd PIP, 3rd MCP, 4th PIP, 4th MCP, 5th PIP and 5th MCP normal.    Muscle Strength (0-5 scale):  Deltoid:  5  Biceps: 5/5   Triceps:  5  : 4/5   Iliopsoas: 4.8  Quadriceps:  4.8     Left Side Rheumatological Exam     Examination finds the shoulder, elbow, wrist, knee, 1st PIP, 1st MCP, 2nd PIP, 2nd MCP, 3rd PIP, 3rd MCP, 4th PIP, 4th MCP, 5th PIP and 5th MCP normal.    Muscle Strength (0-5 scale):  Deltoid:  5  Biceps: 5/5   Triceps:  5  :  4/5   Iliopsoas: 4.8  Quadriceps:  4.8       Lymphadenopathy:     He has no cervical adenopathy.   Neurological: He is alert and oriented to person, place, and time. No cranial nerve deficit. He exhibits normal muscle tone. Gait normal. Coordination normal.   Skin: Skin is warm and dry.     Musculoskeletal: Normal range of motion. He exhibits deformity. He exhibits no edema or tenderness.   No triggering today  He does have early Dupuytren's contracture bilateral third fingers  No synovitis on exam                  Component      Latest Ref Rng & Units 2/18/2019   Sodium      136 - 145 mmol/L 141   Potassium      3.5 - 5.1 mmol/L 4.2   Chloride      95 - 110 mmol/L 106   CO2      23 - 29 mmol/L 27   Glucose      70 - 110 mg/dL 187 (H)   BUN, Bld      8 - 23 mg/dL 20   Creatinine      0.5 - 1.4 mg/dL 1.0   Calcium      8.7 - 10.5 mg/dL 10.0   PROTEIN TOTAL      6.0 - 8.4 g/dL 7.1   Albumin      3.5 - 5.2 g/dL 4.2   BILIRUBIN TOTAL      0.1 - 1.0 mg/dL 0.8   Alkaline Phosphatase      55 - 135 U/L 59   AST      10 - 40 U/L 25   ALT      10 - 44 U/L 18   Anion Gap      8 - 16 mmol/L 8   eGFR if African American      >60 mL/min/1.73 m:2 >60.0   eGFR if non African American      >60 mL/min/1.73 m:2 >60.0         Recent Results (from the past 168 hour(s))   POCT glucose    Collection Time: 07/23/19  9:25 AM   Result Value Ref Range    POCT Glucose 159 (H) 70 - 110 mg/dL   CBC auto differential    Collection Time: 07/24/19 10:07 AM   Result Value  Ref Range    WBC 8.12 3.90 - 12.70 K/uL    RBC 4.29 (L) 4.60 - 6.20 M/uL    Hemoglobin 13.9 (L) 14.0 - 18.0 g/dL    Hematocrit 40.2 40.0 - 54.0 %    Mean Corpuscular Volume 94 82 - 98 fL    Mean Corpuscular Hemoglobin 32.4 (H) 27.0 - 31.0 pg    Mean Corpuscular Hemoglobin Conc 34.6 32.0 - 36.0 g/dL    RDW 12.8 11.5 - 14.5 %    Platelets 260 150 - 350 K/uL    MPV 10.6 9.2 - 12.9 fL    Gran # (ANC) 7.0 1.8 - 7.7 K/uL    Lymph # 0.8 (L) 1.0 - 4.8 K/uL    Mono # 0.3 0.3 - 1.0 K/uL    Eos # 0.0 0.0 - 0.5 K/uL    Baso # 0.02 0.00 - 0.20 K/uL    Gran% 86.8 (H) 38.0 - 73.0 %    Lymph% 9.2 (L) 18.0 - 48.0 %    Mono% 3.4 (L) 4.0 - 15.0 %    Eosinophil% 0.4 0.0 - 8.0 %    Basophil% 0.2 0.0 - 1.9 %    Differential Method Automated    Comprehensive metabolic panel    Collection Time: 07/24/19 10:07 AM   Result Value Ref Range    Sodium 137 136 - 145 mmol/L    Potassium 4.4 3.5 - 5.1 mmol/L    Chloride 102 95 - 110 mmol/L    CO2 27 23 - 29 mmol/L    Glucose 328 (H) 70 - 110 mg/dL    BUN, Bld 23 8 - 23 mg/dL    Creatinine 1.2 0.5 - 1.4 mg/dL    Calcium 10.6 (H) 8.7 - 10.5 mg/dL    Total Protein 6.9 6.0 - 8.4 g/dL    Albumin 3.9 3.5 - 5.2 g/dL    Total Bilirubin 0.6 0.1 - 1.0 mg/dL    Alkaline Phosphatase 60 55 - 135 U/L    AST 16 10 - 40 U/L    ALT 17 10 - 44 U/L    Anion Gap 8 8 - 16 mmol/L    eGFR if African American >60.0 >60 mL/min/1.73 m^2    eGFR if non  57.6 (A) >60 mL/min/1.73 m^2   C-reactive protein    Collection Time: 07/24/19 10:07 AM   Result Value Ref Range    CRP 11.1 (H) 0.0 - 8.2 mg/L   Sedimentation rate, manual    Collection Time: 07/24/19 10:07 AM   Result Value Ref Range    Sed Rate 11 (H) 0 - 10 mm/Hr              Assessment:       1. PMR (polymyalgia rheumatica)    2. Chronic kidney disease, stage 3    3. Spinal stenosis of lumbar region with neurogenic claudication    4. Primary Parkinsonism    5. Primary osteoarthritis involving multiple joints    6. Dupuytren's contracture of both hands     7. Gait abnormality          1. PMR- recent recurrence now back on pred 5 mg bid doing well     In the past weaned off X 3 separate time but had recurrence when off prednisone        2. Lumbar stenosis with Lower back pain and radic pain   Post L3-S1 facet injection early this week  Pain better     3. CKD and DM  not able to take nsaids- renal function looks good eGFR 53    4. OA generalized    5. Dupuytren's contracture- early and mild will middle flexor tendon    6. Long term steroid use - normal dexa 2014- repeat dexa 6/6/18- normal with stable bmd compared back to 2014    7.  parkinson gait/imbalance on sinement- seeing neurology Dr Harden     Plan:         Will slowly wean his pred and goal to keep him on low dose for a while ~2.5 mg/d  Will start with cutting  back on pred to 5 mg in am and 2.5 mg pm  In 4 weeks check esr and crp if good then drop to 2.5 mg bid on pred dose   repeat labs 4 weeks later (8wks from now)  See me back in 9-10 weeks for check up     Repeat dexa 3-4 yrs    Topical voltaren gel qid prn     Continue to Try tummeric 1500 mg  Continue glucosamine and fish oil  Tylenol for pain , avoid nonsteroidals    At home exercise for dupuytren contracture, paraffin and range of motion exercises      F/U with neurology and interventional pain provider    rtc 9-10 weeks

## 2019-08-05 ENCOUNTER — PATIENT OUTREACH (OUTPATIENT)
Dept: ADMINISTRATIVE | Facility: OTHER | Age: 79
End: 2019-08-05

## 2019-08-07 ENCOUNTER — OFFICE VISIT (OUTPATIENT)
Dept: PAIN MEDICINE | Facility: CLINIC | Age: 79
End: 2019-08-07
Payer: MEDICARE

## 2019-08-07 ENCOUNTER — OFFICE VISIT (OUTPATIENT)
Dept: NEUROLOGY | Facility: CLINIC | Age: 79
End: 2019-08-07
Payer: MEDICARE

## 2019-08-07 VITALS
DIASTOLIC BLOOD PRESSURE: 79 MMHG | HEART RATE: 72 BPM | BODY MASS INDEX: 22.22 KG/M2 | SYSTOLIC BLOOD PRESSURE: 159 MMHG | WEIGHT: 173.06 LBS

## 2019-08-07 VITALS
HEART RATE: 73 BPM | DIASTOLIC BLOOD PRESSURE: 74 MMHG | SYSTOLIC BLOOD PRESSURE: 145 MMHG | HEIGHT: 74 IN | WEIGHT: 173.5 LBS | BODY MASS INDEX: 22.27 KG/M2

## 2019-08-07 DIAGNOSIS — G31.84 MCI (MILD COGNITIVE IMPAIRMENT): ICD-10-CM

## 2019-08-07 DIAGNOSIS — M54.16 LUMBAR RADICULOPATHY: ICD-10-CM

## 2019-08-07 DIAGNOSIS — G20.C PRIMARY PARKINSONISM: ICD-10-CM

## 2019-08-07 DIAGNOSIS — G89.4 CHRONIC PAIN SYNDROME: ICD-10-CM

## 2019-08-07 DIAGNOSIS — M51.36 DDD (DEGENERATIVE DISC DISEASE), LUMBAR: ICD-10-CM

## 2019-08-07 DIAGNOSIS — M48.062 SPINAL STENOSIS OF LUMBAR REGION WITH NEUROGENIC CLAUDICATION: ICD-10-CM

## 2019-08-07 DIAGNOSIS — M47.816 LUMBAR SPONDYLOSIS: Primary | ICD-10-CM

## 2019-08-07 PROCEDURE — 1100F PTFALLS ASSESS-DOCD GE2>/YR: CPT | Mod: HCNC,CPTII,S$GLB, | Performed by: NURSE PRACTITIONER

## 2019-08-07 PROCEDURE — 3288F FALL RISK ASSESSMENT DOCD: CPT | Mod: HCNC,CPTII,S$GLB, | Performed by: NURSE PRACTITIONER

## 2019-08-07 PROCEDURE — 3288F PR FALLS RISK ASSESSMENT DOCUMENTED: ICD-10-PCS | Mod: HCNC,CPTII,S$GLB, | Performed by: NURSE PRACTITIONER

## 2019-08-07 PROCEDURE — 99213 PR OFFICE/OUTPT VISIT, EST, LEVL III, 20-29 MIN: ICD-10-PCS | Mod: HCNC,S$GLB,, | Performed by: PSYCHIATRY & NEUROLOGY

## 2019-08-07 PROCEDURE — 3288F PR FALLS RISK ASSESSMENT DOCUMENTED: ICD-10-PCS | Mod: HCNC,CPTII,S$GLB, | Performed by: PSYCHIATRY & NEUROLOGY

## 2019-08-07 PROCEDURE — 99999 PR PBB SHADOW E&M-EST. PATIENT-LVL III: CPT | Mod: PBBFAC,HCNC,, | Performed by: PSYCHIATRY & NEUROLOGY

## 2019-08-07 PROCEDURE — 3078F PR MOST RECENT DIASTOLIC BLOOD PRESSURE < 80 MM HG: ICD-10-PCS | Mod: HCNC,CPTII,S$GLB, | Performed by: NURSE PRACTITIONER

## 2019-08-07 PROCEDURE — 99999 PR PBB SHADOW E&M-EST. PATIENT-LVL IV: ICD-10-PCS | Mod: PBBFAC,HCNC,, | Performed by: NURSE PRACTITIONER

## 2019-08-07 PROCEDURE — 3077F PR MOST RECENT SYSTOLIC BLOOD PRESSURE >= 140 MM HG: ICD-10-PCS | Mod: HCNC,CPTII,S$GLB, | Performed by: PSYCHIATRY & NEUROLOGY

## 2019-08-07 PROCEDURE — 3078F DIAST BP <80 MM HG: CPT | Mod: HCNC,CPTII,S$GLB, | Performed by: NURSE PRACTITIONER

## 2019-08-07 PROCEDURE — 3077F SYST BP >= 140 MM HG: CPT | Mod: HCNC,CPTII,S$GLB, | Performed by: PSYCHIATRY & NEUROLOGY

## 2019-08-07 PROCEDURE — 99213 OFFICE O/P EST LOW 20 MIN: CPT | Mod: HCNC,S$GLB,, | Performed by: PSYCHIATRY & NEUROLOGY

## 2019-08-07 PROCEDURE — 1100F PR PT FALLS ASSESS DOC 2+ FALLS/FALL W/INJURY/YR: ICD-10-PCS | Mod: HCNC,CPTII,S$GLB, | Performed by: PSYCHIATRY & NEUROLOGY

## 2019-08-07 PROCEDURE — 1100F PR PT FALLS ASSESS DOC 2+ FALLS/FALL W/INJURY/YR: ICD-10-PCS | Mod: HCNC,CPTII,S$GLB, | Performed by: NURSE PRACTITIONER

## 2019-08-07 PROCEDURE — 99214 PR OFFICE/OUTPT VISIT, EST, LEVL IV, 30-39 MIN: ICD-10-PCS | Mod: HCNC,S$GLB,, | Performed by: NURSE PRACTITIONER

## 2019-08-07 PROCEDURE — 99999 PR PBB SHADOW E&M-EST. PATIENT-LVL III: ICD-10-PCS | Mod: PBBFAC,HCNC,, | Performed by: PSYCHIATRY & NEUROLOGY

## 2019-08-07 PROCEDURE — 3288F FALL RISK ASSESSMENT DOCD: CPT | Mod: HCNC,CPTII,S$GLB, | Performed by: PSYCHIATRY & NEUROLOGY

## 2019-08-07 PROCEDURE — 99499 RISK ADDL DX/OHS AUDIT: ICD-10-PCS | Mod: HCNC,S$GLB,, | Performed by: PSYCHIATRY & NEUROLOGY

## 2019-08-07 PROCEDURE — 99999 PR PBB SHADOW E&M-EST. PATIENT-LVL IV: CPT | Mod: PBBFAC,HCNC,, | Performed by: NURSE PRACTITIONER

## 2019-08-07 PROCEDURE — 3077F SYST BP >= 140 MM HG: CPT | Mod: HCNC,CPTII,S$GLB, | Performed by: NURSE PRACTITIONER

## 2019-08-07 PROCEDURE — 99499 UNLISTED E&M SERVICE: CPT | Mod: HCNC,S$GLB,, | Performed by: PSYCHIATRY & NEUROLOGY

## 2019-08-07 PROCEDURE — 3078F PR MOST RECENT DIASTOLIC BLOOD PRESSURE < 80 MM HG: ICD-10-PCS | Mod: HCNC,CPTII,S$GLB, | Performed by: PSYCHIATRY & NEUROLOGY

## 2019-08-07 PROCEDURE — 99214 OFFICE O/P EST MOD 30 MIN: CPT | Mod: HCNC,S$GLB,, | Performed by: NURSE PRACTITIONER

## 2019-08-07 PROCEDURE — 3077F PR MOST RECENT SYSTOLIC BLOOD PRESSURE >= 140 MM HG: ICD-10-PCS | Mod: HCNC,CPTII,S$GLB, | Performed by: NURSE PRACTITIONER

## 2019-08-07 PROCEDURE — 1100F PTFALLS ASSESS-DOCD GE2>/YR: CPT | Mod: HCNC,CPTII,S$GLB, | Performed by: PSYCHIATRY & NEUROLOGY

## 2019-08-07 PROCEDURE — 3078F DIAST BP <80 MM HG: CPT | Mod: HCNC,CPTII,S$GLB, | Performed by: PSYCHIATRY & NEUROLOGY

## 2019-08-07 NOTE — PROGRESS NOTES
[facility] - NEUROLOGY EPILEPSY  [facility] [place]    Date: August 7, 2019   Patient Name: Jagdeep Greenberg   MRN: [id number]   PCP: Panda Contreras  Referring Provider: No ref. provider found    Assessmentn and Plan:   Jagdeep Greenberg is a 78 y.o. male Presenting in follow-up for parkinsonism.  Will continue current meds with no changes.  Reviewed falls precautions.  Patient continues to fall pain management.  All questions were answered.    Problem List Items Addressed This Visit        Neuro    Parkinsonism    Current Assessment & Plan     -- continue current sinemet 1/2 tab TID  -- reviewed falls precautions         MCI (mild cognitive impairment)    Current Assessment & Plan     -- continuing current aricept         Lumbar radiculopathy    Current Assessment & Plan     -- following with pain management         Spinal stenosis of lumbar region with neurogenic claudication    Current Assessment & Plan     -- declines surgery             Ernie Harden MD  [facility]   Department of Neurology    Patient note was created using Dragon Dictation.  Any errors in syntax or even information may not have been identified and edited on initial review prior to signing this note.  Subjective:   HPI:   Mr. Jagdeep Greenberg is a 78 y.o. male presenting in follow up for parkinsonism. The patients presents with his wife who contributes to the history.  The patient reports that he has been doing well with his current Sinemet.  He denies any falls and feels that his mobility is stable.  He denies freezing, behavioral changes, mood changes, or sleep issues.  He denies side effects from Sinemet.  He continues to complain of low back pain as well as mild gait instability due to lumbar stenosis.  He attempted ALBERTO with pain management but states that this was only partially beneficial for several days.  He has a follow-up appointment with him today.  He has no other complaints  today.    PAST MEDICAL HISTORY:  Past Medical History:   Diagnosis Date    Adenomatous polyp of colon     Arthritis     Back pain     Carotid artery plaque     Less than 50%    Chronic kidney disease, stage 3     Diabetes mellitus, type 2     Diabetes with neurologic complications     Erectile dysfunction following radical prostatectomy     Fractures     Hyperlipidemia LDL goal < 100     Hypertension goal BP (blood pressure) < 130/80     Lacunar stroke 8/17/2017    MCI (mild cognitive impairment)     Parkinson disease     Parkinsonism 4/11/2017    Prostate cancer 2001    Renal manifestation of secondary diabetes mellitus     Stroke      PAST SURGICAL HISTORY:  Past Surgical History:   Procedure Laterality Date    APPENDECTOMY      COLONOSCOPY  In 3/2/2010    Repeat 5 years    COLONOSCOPY N/A 8/23/2018    [facility] ENDO (4TH FLR)    COLONOSCOPY N/A 10/24/2017    [facility]O    COLONOSCOPY N/A 10/11/2016    [facility] at [facility] ENDO    COLONOSCOPY N/A 9/29/2015    [facility] at [facility] ENDO    COLONOSCOPY - OPERATIVE N/A 1/9/2018    [facility]formed by [facility] at [facility] OR 2ND FLR    FRACTURE SURGERY      HERNIA REPAIR      INJECTION, FACET JOINT--Right L3,4,5,S1 Right 7/23/2019    [facility] PAIN MGT    LYMPH NODE DISSECTION      Pelvic    POLYPECTOMY; endoscopic mucosal resection (EMR) N/A 1/9/2018    Per[facility] by [facility] at [facility] OR 2ND FLR    PROSTATECTOMY      SHOULDER ARTHROSCOPY      Right     CURRENT MEDS:  Current Outpatient Medications   Medication Sig Dispense Refill    amLODIPine (NORVASC) 10 MG tablet Take 1 tablet (10 mg total) by mouth once daily. 90 tablet 3    benazepril (LOTENSIN) 20 MG tablet Take 1 tablet (20 mg total) by mouth once daily. 90 tablet 3    blood sugar diagnostic (GLUCOSE BLOOD) Strp Test  glucose 1 x daily 35 strip prn    carbidopa-levodopa  mg (SINEMET)  mg per tablet Take 0.5 tablets by mouth 3 (three) times daily. 135 tablet 3    donepezil (ARICEPT) 5 MG tablet Take 1 tablet (5 mg total) by mouth every evening. 90 tablet 3    fish oil-omega-3 fatty acids 300-1,000 mg capsule Take 2 g by mouth once daily.      glimepiride (AMARYL) 2 MG tablet TAKE 1 TABLET(2 MG) BY MOUTH DAILY WITH BREAKFAST 90 tablet 1    GLUC.METER,DIS.P-LOADED STRIPS (GLUCOSE METER, DISP & STRIPS) Kit Check glucose once per day 1 kit prn    GLUCOSAMINE HCL (GLUCOSAMINE, BULK, MISC) by Misc.(Non-Drug; Combo Route) route.      hydroCHLOROthiazide (HYDRODIURIL) 12.5 MG Tab Take 1 tablet (12.5 mg total) by mouth once daily. 90 tablet 3    L. RHAMNOSUS GG/INULIN (CULTURELLE PROBIOTICS ORAL) Take by mouth.      lancets Misc As directed 100 each prn    MEN'S MULTI-VITAMIN ORAL Take by mouth.      metFORMIN (GLUCOPHAGE) 1000 MG tablet Take 1 tablet (1,000 mg total) by mouth once daily. 90 tablet 3    pravastatin (PRAVACHOL) 20 MG tablet Take 1 tablet (20 mg total) by mouth once daily. 90 tablet 3    predniSONE (DELTASONE) 2.5 MG tablet Take 2 tablets in the morning and 1 tablet at night 270 tablet 3    turmeric root extract 500 mg Cap Take by mouth.      aspirin (ECOTRIN) 81 MG EC tablet Take 1 tablet (81 mg total) by mouth once daily.  0     No current facility-administered medications for this visit.      ALLERGIES:  Review of patient's allergies indicates:  No Known Allergies    FAMILY HISTORY:  Family History   Problem Relation Age of Onset    Leukemia Father 68    Anesthesia problems Neg Hx      SOCIAL HISTORY:  Social History     Tobacco Use    Smoking status: Never Smoker    Smokeless tobacco: Never Used   Substance Use Topics    Alcohol use: Yes     Alcohol/week: 21.0 oz     Types: 14 Cans of beer, 21 Shots of liquor per week     Comment: 3 drinks a day    Drug use: No     Review of Systems:  12  review of systems is negative except for the symptoms mentioned in HPI.      Objective:     Vitals:    19 0919   BP: (!) 145/74   Pulse: 73       General: NAD, well nourished   Eyes: no tearing, discharge, no erythema   ENT: moist mucous membranes of the oral cavity, nares patent    Neck: Supple, full range of motion  Cardiovascular: Warm and well perfused, pulses equal and symmetrical  Lungs: Normal work of breathing, normal chest wall excursions  Skin: No rash, lesions, or breakdown on exposed skin  Psychiatry: Mood and affect are appropriate   Abdomen: soft, non tender, non distended  Extremeties: No cyanosis, clubbing or edema.    Neurological   MENTAL STATUS: Alert and oriented to person, place, and time. Attention and concentration within normal limits. Speech without dysarthria, able to name and repeat with moderate Recent and remote memory fair to poor   CRANIAL NERVES: Visual fields intact. PERRL. EOMI with slow saccades. Facial sensation intact. Face symmetrical with hypomimia. Hearing grossly intact. Full shoulder shrug bilaterally. Tongue protrudes midline   SENSORY: Sensation is intact to light touch throughout.    MOTOR: Normal bulk and mildly increased tone in RUE>LUE. No resting tremor noted. 5/5 deltoid, biceps, triceps, interosseous, hand  bilaterally. 5/5 iliopsoas, knee extension/flexion, foot dorsi/plantarflexion bilaterally.    REFLEXES: Symmetric and  1 throughout.   CEREBELLAR/COORDINATION/GAIT: Gait with normal stride and slightly reduced arm swing R>L. Negative pull back test.  Normal rapid alternating movements.     MOCA Results 17:   Visuospatial/Executive: 3/5  Namin/3  Attention:   Language: 1/3  Abstraction: 12  Delayed Recall: 05  Orientation:   Total:

## 2019-08-07 NOTE — PROGRESS NOTES
[facility] New Patient Evaluation    Referred by: Ernie Harden MD   Reason for referral: Lumbar radiculopathy     CC:   Chief Complaint   Patient presents with    Low-back Pain     Last 3 PDI Scores 8/7/2019 6/19/2019   Pain Disability Index (PDI) 9 9      Interval Update: 8/7/19 Pt returns today S/P RIGHT L3-4, L4-5 and L5-S1 Lumbar Facet Injection on 7/23/19 with 90% relief for 2 days.      HPI:   Jagdeep Greenberg is a 78 y.o. male who complains of chronic low back pain.  He reports a history of pain shooting down the leg, but is not experiencing that at this time.  He also reports symptoms consistent with mild neurogenic claudication.    Location: low back   Onset: 2 yrs ago  Current Pain Score: 3/4/10  Daily Pain of Range: 4-6/10  Quality: Aching and Burning  Radiation: no  Worsened by: standing for more than 5 minutes and daily activity   Improved by: laying down and sitting    Previous Therapies:  PT/OT: Yes  HEP:  Yes  Interventions: 7/23/19 RIGHT L3-4, L4-5 and L5-S1 Lumbar Facet Injection  Surgery: Denies spine surgery  Medications:   - NSAIDS:   - MSK Relaxants:   - TCAs:   - SNRIs:   - Topicals:   - Anticonvulsants:  - Opioids:     Current Pain Medications:  1.      Review of Systems:  Review of Systems   Constitutional: Negative for chills and fever.   HENT: Negative for nosebleeds.    Eyes: Negative for blurred vision and pain.   Respiratory: Negative for hemoptysis.    Cardiovascular: Negative for chest pain and palpitations.   Gastrointestinal: Negative for heartburn, nausea and vomiting.   Genitourinary: Negative for dysuria and hematuria.   Musculoskeletal: Positive for back pain. Negative for myalgias.   Skin: Negative for rash.   Neurological: Positive for tingling. Negative for focal weakness, seizures, loss of consciousness and weakness.   Endo/Heme/Allergies: Does not bruise/bleed easily.   Psychiatric/Behavioral: Negative for hallucinations.       History:    Current  Outpatient Medications:     amLODIPine (NORVASC) 10 MG tablet, Take 1 tablet (10 mg total) by mouth once daily., Disp: 90 tablet, Rfl: 3    aspirin (ECOTRIN) 81 MG EC tablet, Take 1 tablet (81 mg total) by mouth once daily., Disp: , Rfl: 0    benazepril (LOTENSIN) 20 MG tablet, Take 1 tablet (20 mg total) by mouth once daily., Disp: 90 tablet, Rfl: 3    blood sugar diagnostic (GLUCOSE BLOOD) Strp, Test glucose 1 x daily, Disp: 35 strip, Rfl: prn    carbidopa-levodopa  mg (SINEMET)  mg per tablet, Take 0.5 tablets by mouth 3 (three) times daily., Disp: 135 tablet, Rfl: 3    donepezil (ARICEPT) 5 MG tablet, Take 1 tablet (5 mg total) by mouth every evening., Disp: 90 tablet, Rfl: 3    fish oil-omega-3 fatty acids 300-1,000 mg capsule, Take 2 g by mouth once daily., Disp: , Rfl:     glimepiride (AMARYL) 2 MG tablet, TAKE 1 TABLET(2 MG) BY MOUTH DAILY WITH BREAKFAST, Disp: 90 tablet, Rfl: 1    GLUC.METER,DIS.P-LOADED STRIPS (GLUCOSE METER, DISP & STRIPS) Kit, Check glucose once per day, Disp: 1 kit, Rfl: prn    GLUCOSAMINE HCL (GLUCOSAMINE, BULK, MISC), by Misc.(Non-Drug; Combo Route) route., Disp: , Rfl:     hydroCHLOROthiazide (HYDRODIURIL) 12.5 MG Tab, Take 1 tablet (12.5 mg total) by mouth once daily., Disp: 90 tablet, Rfl: 3    L. RHAMNOSUS GG/INULIN (CULTURELLE PROBIOTICS ORAL), Take by mouth., Disp: , Rfl:     lancets Misc, As directed, Disp: 100 each, Rfl: prn    MEN'S MULTI-VITAMIN ORAL, Take by mouth., Disp: , Rfl:     metFORMIN (GLUCOPHAGE) 1000 MG tablet, Take 1 tablet (1,000 mg total) by mouth once daily., Disp: 90 tablet, Rfl: 3    pravastatin (PRAVACHOL) 20 MG tablet, Take 1 tablet (20 mg total) by mouth once daily., Disp: 90 tablet, Rfl: 3    predniSONE (DELTASONE) 2.5 MG tablet, Take 2 tablets in the morning and 1 tablet at night, Disp: 270 tablet, Rfl: 3    turmeric root extract 500 mg Cap, Take by mouth., Disp: , Rfl:     Past Medical History:   Diagnosis Date     Adenomatous polyp of colon     Arthritis     Back pain     Carotid artery plaque     Less than 50%    Chronic kidney disease, stage 3     Diabetes mellitus, type 2     Diabetes with neurologic complications     Erectile dysfunction following radical prostatectomy     Fractures     Hyperlipidemia LDL goal < 100     Hypertension goal BP (blood pressure) < 130/80     Lacunar stroke 8/17/2017    MCI (mild cognitive impairment)     Parkinson disease     Parkinsonism 4/11/2017    Prostate cancer 2001    Renal manifestation of secondary diabetes mellitus     Stroke        Past Surgical History:   Procedure Laterality Date    APPENDECTOMY      COLONOSCOPY  In 3/2/2010    Repeat 5 years    COLONOSCOPY N/A 8/23/2018    [facility] ENDO ([facility])    COLONOSCOPY N/A 10/24/2017    [facility] ENDO    COLONOSCOPY N/A 10/11/2016    [facility] ENDO    COLONOSCOPY N/A 9/29/2015    [facility] ENDO    COLONOSCOPY - OPERATIVE N/A 1/9/2018    [facility] OR 2ND FLR    FRACTURE SURGERY      HERNIA REPAIR      INJECTION, FACET JOINT--Right L3,4,5,S1 Right 7/23/2019    Per[facility] by [facility] Jr., [facility] at [facility] PAIN MGT    LYMPH NODE DISSECTION      Pelvic    POLYPECTOMY; endoscopic mucosal resection (EMR) N/A 1/9/2018    [facility] OR 2ND FLR    PROSTATECTOMY      SHOULDER ARTHROSCOPY      Right       Family History   Problem Relation Age of Onset    Leukemia Father 68    Anesthesia problems Neg Hx        Social History     Socioeconomic History    Marital status:      Spouse name: Joaquin    Number of children: 0    Years of education: Not on file    Highest education level: Not on file   Occupational History    Occupation: Retired   Social Needs    Financial resource strain: Not on file    Food insecurity:      Worry: Not on file     Inability: Not on file    Transportation needs:     Medical: Not on file     Non-medical: Not on file   Tobacco Use    Smoking status: Never Smoker    Smokeless tobacco: Never Used   Substance and Sexual Activity    Alcohol use: Yes     Alcohol/week: 21.0 oz     Types: 14 Cans of beer, 21 Shots of liquor per week     Comment: 3 drinks a day    Drug use: No    Sexual activity: Not on file   Lifestyle    Physical activity:     Days per week: Not on file     Minutes per session: Not on file    Stress: Not on file   Relationships    Social connections:     Talks on phone: Not on file     Gets together: Not on file     Attends Synagogue service: Not on file     Active member of club or organization: Not on file     Attends meetings of clubs or organizations: Not on file     Relationship status: Not on file   Other Topics Concern    Not on file   Social History Narrative    Not on file       Review of patient's allergies indicates:  No Known Allergies    Physical Exam:  Vitals:    08/07/19 1022   BP: (!) 159/79   Pulse: 72   Weight: 78.5 kg (173 lb 1 oz)   PainSc:   3     General    Nursing note and vitals reviewed.  Constitutional: He is oriented to person, place, and time. He appears well-developed and well-nourished. No distress.   HENT:   Head: Normocephalic and atraumatic.   Nose: Nose normal.   Eyes: Conjunctivae and EOM are normal. Pupils are equal, round, and reactive to light. Right eye exhibits no discharge. Left eye exhibits no discharge. No scleral icterus.   Neck: No JVD present.   Cardiovascular: Intact distal pulses.    Pulmonary/Chest: Effort normal. No respiratory distress.   Abdominal: He exhibits no distension.   Neurological: He is alert and oriented to person, place, and time. Coordination normal.   Psychiatric: He has a normal mood and affect. His behavior is normal. Judgment and thought content normal.     General Musculoskeletal Exam   Gait: normal     Back  (L-Spine & T-Spine) / Neck (C-Spine) Exam     Tenderness Right paramedian tenderness of the Lower L-Spine. Left paramedian tenderness of the Lower L-Spine.     Back (L-Spine & T-Spine) Range of Motion   Back extension: facet loading is positive and exacerabtes/reproduces the patient's typical low back pain    Back flexion: limited ROM but partial relief of low back pain noted.     Spinal Sensation   Right Side Sensation  L-Spine Level: normal  Left Side Sensation  L-Spine Level: normal    Other He has no scoliosis .      Muscle Strength   Right Lower Extremity   Hip Flexion: 5/5   Hip Extensors: 5/5  Quadriceps:  5/5   Hamstrin/5   Gastrocsoleus:  5/5/5  Left Lower Extremity   Hip Flexion: 5/5   Hip Extensors: 5/5  Quadriceps:  5/5   Hamstrin/5   Gastrocsoleus:  5/5/5    Reflexes     Left Side  Quadriceps:  2+  Achilles:  2+    Right Side   Quadriceps:  2+  Achilles:  2+      Imaging:  X-Ray Lumbar Spine Ap And Lateral   Order: [id number]   Status:  Final result   Visible to patient:  Yes (Patient Portal) Next appt:  Today at 10:45 AM in Pain Medicine (Jax Nelson Jr, MD) Dx:  Gait abnormality; Low back pain, non-...   Details     Reading Physician Reading Date Result Priority   Juan Escobardy, DO 2019       Narrative     EXAMINATION:  XR LUMBAR SPINE AP AND LATERAL    CLINICAL HISTORY:  Low back pain, >6wks conservative tx, persistent-progressive sx, surgical candidate;Low back pain, risk factors (osteoporosis or chronic steroid use or elderly);Low back pain    TECHNIQUE:  AP, lateral and spot images were performed of the lumbar spine.    COMPARISON:  2017    FINDINGS:  The vertebral bodies demonstrate a normal height.  There is a couple mm of anterolisthesis of L4 on L5.  There is moderate to severe disc space narrowing and spondylosis present at the L5-S1 level.  There is mild dextroscoliosis of the lumbar spine.  There is at least moderate bilateral facet arthropathy noted at the L4-5  and L5-S1 levels.  Vascular calcification seen involving the aorta.      Impression       1.  As above      Electronically signed by: Juan Glaser DO  Date: 05/07/2019  Time: 16:09               Labs:  BMP  Lab Results   Component Value Date     07/24/2019    K 4.4 07/24/2019     07/24/2019    CO2 27 07/24/2019    BUN 23 07/24/2019    CREATININE 1.2 07/24/2019    CALCIUM 10.6 (H) 07/24/2019    ANIONGAP 8 07/24/2019    ESTGFRAFRICA >60.0 07/24/2019    EGFRNONAA 57.6 (A) 07/24/2019     Lab Results   Component Value Date    ALT 17 07/24/2019    AST 16 07/24/2019    ALKPHOS 60 07/24/2019    BILITOT 0.6 07/24/2019       Assessment:  Jagdeep Greenberg is a 78 y.o. male with the following diagnoses based on history, exam, and imaging:    Problem List Items Addressed This Visit        Neuro    Lumbar spondylosis - Primary    Relevant Orders    Ambulatory consult to Physical Therapy    DDD (degenerative disc disease), lumbar    Relevant Orders    Ambulatory consult to Physical Therapy    Spinal stenosis of lumbar region with neurogenic claudication    Relevant Orders    Ambulatory consult to Physical Therapy    Chronic pain    Relevant Orders    Ambulatory consult to Physical Therapy        08/07/[id number]-[id number]-old male patient status post right L3-4, L4-5, L5-S1 facet injection with steroid patient reports 90% relief for 2 days and then pain seemed to return, patient states his worst pain it is while performing ADLs, recommended consult physical therapy, also prescribe a compound cream to apply to the area to 3 times daily, also recommended patient utilize extra-strength Tylenol  to a 1000 mg every 8 hr for pain also give patient a home exercise blood cleared with stretches to help with lumbar stabilization and lumbar strengthen,  patient wife were in agreement of this plan.       : Reviewed and consistent with medication use as prescribed.    Treatment Plan:   Procedures:  None at this time  Patient  appears to be suffering mostly from facet arthropathy despite the MRI findings showing other pathology.  PT/OT/HEP:  Referral to external PT today, HE booklet given today.   Medications:  Prescription for compound cream today, also recommended Tylenol Extra Strength 500 to a 1000 mg Q 8 p.r.n. for pain  Labs: reviewed and medications are appropriately dosed for current hepatorenal function.  Imaging: No additional recommended at this time. Previous imaging reviewed and discussed with patient today  -More than 25 minutes spent with patient, over 50% of that time was spent in counseling.    Follow Up: RTC 10 weeks    Albert Vidal NP-C  Interventional Pain Management      Disclaimer: This note was partly generated using dictation software which may occasionally result in transcription errors.

## 2019-09-05 NOTE — PROGRESS NOTES
"NEUROPSYCHOLOGICAL EVALUATION - CONFIDENTIAL    REFERRAL SOURCE: Ernie Harden MD  MEDICAL NECESSITY:  Evaluate cognitive functioning in the setting of mild cognitive impairment.   DATE CONDUCTED: 9/9/2019    SOURCES OF INFORMATION:  The following was gathered from a clinical interview with Mr. Jagdeep Greenberg and review of the available medical records. Mr. Greenberg expressed an understanding of the purpose of the evaluation and consented to all procedures. Total licensed billing psychologists professional time including clinical interview, test administration and interpretation of tests administered by the billing psychologist, integration of test results and other clinical data, preparing the final report, and personally reporting results to the patient   03364 - 60 minutes, 08513/68061 - minutes, 78395/15800 - minutes.     HISTORY OF PRESENT ILLNESS: Mr. Jagedep Greenberg is an 78 y.o., {left/right:062354}-handed, {RaceBMM:78215} male with *** years of education who was referred for a neuropsychological evaluation in the setting of parkinsonism and cognitive dysfunction.     Onset of parkinsonism?    Onset of cognitive dysfunction?    Mr. Greenberg underwent neuropsychological testing with Dr. OLIVE Contreras in 2017. Impressions offered from that evaluation included: "Overall, the patient has a Mild Neurocognitive Disorder. His pattern of cognitive dysfunction showed mostly subcortical dysfunction (e.g., slowed speed, executive dysfunction, inefficient learning of new information likely 2/2 attention and speed difficulties). Etiology is unclear at this time. First, he has a number of vascular risk factors (e.g., T2DM, HTN, HLD, and CKD-3). Second, he has some motor-related changes that have apparently been responding to Sinemet. Thus, parkinson's could be contributory. Third, he has some anxiety and has a pattern of heavier than normal drinking for many years (4-5 drinks nightly)."        Neuropsychiatric " "Symptoms:  Hallucinations: {Endorsed/Denied:59651}  Delusional/Paranoid Thinking: {Endorsed/Denied:58190}  Apathy: {Endorsed/Denied:78260}  Irritability/Agitation: {Endorsed/Denied:53647}  Disinhibition: {Endorsed/Denied:10897}  Depression/Labile Mood: {Endorsed/Denied:43395}   Anxiety: {Endorsed/Denied:52883}    DAILY FUNCTIONING:  BASIC ADLS:  Feeding: {INDEPENDENT/DEPENDENT:89502}  Dressing: {INDEPENDENT/DEPENDENT:19969}  Bathing: {INDEPENDENT/DEPENDENT:14227}  Toileting: {INDEPENDENT/DEPENDENT:93951}    IADLS:  Support System: ***  Safety Risks:    Locking Doors: ***   Wandering: {Endorsed/Denied:02023}   Falls: {Endorsed/Denied:65160}  Appointment Management: {INDEPENDENT/DEPENDENT:06727}  Medication Compliance: {INDEPENDENT/DEPENDENT:33019}  Ability to Understand/Follow Treatment Plan: {INDEPENDENT/DEPENDENT:20719}  Financial Management: {INDEPENDENT/DEPENDENT:66301}   Susceptibility: ***  Cooking: {INDEPENDENT/DEPENDENT:71061}  Driving: Stopped driving in  After becoming confused while on the road.     MEDICAL HISTORY: Mr. Greenberg  has a past medical history of Adenomatous polyp of colon, Arthritis, Back pain, Carotid artery plaque, Chronic kidney disease, stage 3, Diabetes mellitus, type 2, Diabetes with neurologic complications, Erectile dysfunction following radical prostatectomy, Fractures, Hyperlipidemia LDL goal < 100, Hypertension goal BP (blood pressure) < 130/80, Lacunar stroke (8/17/2017), MCI (mild cognitive impairment), Parkinson disease, Parkinsonism (4/11/2017), Prostate cancer (2001), Renal manifestation of secondary diabetes mellitus, and Stroke.    NEUROIMAGING:  Brain MRI 4/2017: "Diffuse parenchymal atrophy noted, with mild changes of small vessel disease. Remote lacunar infarct noted in the left centrum semiovale. No restricted diffusion. No hemorrhage or mass displacement. No abnormal extra-axial fluid collections are identified. Flow-voids are noted in all major intracranial vessels. " "Paranasal sinuses and mastoids are clear. Marrow signal throughout the calvarium is normal. No structural anomalies are apparent. Post-contrast images reveal no pathologic meningeal or parenchymal enhancement."    SUBSTANCE USE: Mr. Greenberg  reports that he has never smoked. He has never used smokeless tobacco. He reports that he drinks about 21.0 oz of alcohol per week. He reports that he does not use drugs.    CURRENT MEDICATIONS:    Current Outpatient Medications:     amLODIPine (NORVASC) 10 MG tablet, Take 1 tablet (10 mg total) by mouth once daily., Disp: 90 tablet, Rfl: 3    aspirin (ECOTRIN) 81 MG EC tablet, Take 1 tablet (81 mg total) by mouth once daily., Disp: , Rfl: 0    benazepril (LOTENSIN) 20 MG tablet, Take 1 tablet (20 mg total) by mouth once daily., Disp: 90 tablet, Rfl: 3    blood sugar diagnostic (GLUCOSE BLOOD) Strp, Test glucose 1 x daily, Disp: 35 strip, Rfl: prn    carbidopa-levodopa  mg (SINEMET)  mg per tablet, Take 0.5 tablets by mouth 3 (three) times daily., Disp: 135 tablet, Rfl: 3    donepezil (ARICEPT) 5 MG tablet, Take 1 tablet (5 mg total) by mouth every evening., Disp: 90 tablet, Rfl: 3    fish oil-omega-3 fatty acids 300-1,000 mg capsule, Take 2 g by mouth once daily., Disp: , Rfl:     glimepiride (AMARYL) 2 MG tablet, TAKE 1 TABLET(2 MG) BY MOUTH DAILY WITH BREAKFAST, Disp: 90 tablet, Rfl: 1    GLUC.METER,DIS.P-LOADED STRIPS (GLUCOSE METER, DISP & STRIPS) Kit, Check glucose once per day, Disp: 1 kit, Rfl: prn    GLUCOSAMINE HCL (GLUCOSAMINE, BULK, MISC), by Misc.(Non-Drug; Combo Route) route., Disp: , Rfl:     hydroCHLOROthiazide (HYDRODIURIL) 12.5 MG Tab, Take 1 tablet (12.5 mg total) by mouth once daily., Disp: 90 tablet, Rfl: 3    L. RHAMNOSUS GG/INULIN (CULTURELLE PROBIOTICS ORAL), Take by mouth., Disp: , Rfl:     lancets Misc, As directed, Disp: 100 each, Rfl: prn    MEN'S MULTI-VITAMIN ORAL, Take by mouth., Disp: , Rfl:     metFORMIN (GLUCOPHAGE) " "1000 MG tablet, Take 1 tablet (1,000 mg total) by mouth once daily., Disp: 90 tablet, Rfl: 3    pravastatin (PRAVACHOL) 20 MG tablet, Take 1 tablet (20 mg total) by mouth once daily., Disp: 90 tablet, Rfl: 3    predniSONE (DELTASONE) 2.5 MG tablet, Take 2 tablets in the morning and 1 tablet at night, Disp: 270 tablet, Rfl: 3    turmeric root extract 500 mg Cap, Take by mouth., Disp: , Rfl:      PSYCHIATRIC HISTORY: ***    SUICIDAL IDEATION:  History: ***  Current Stressors: ***  Active Suicidal Ideation:{Endorsed/Denied:13526}  Plan/Intent: {Endorsed/Denied:24006}  Protective Factors: ***  Risk Factors:   Access to Firearms:{Endorsed/Denied:19528}   Terminal/Chronic Illness: {Endorsed/Denied:85742}   Substance Use/Abuse:{Endorsed/Denied:47852}   Social Support/Relationship Status: ***    FAMILY HISTORY: family history includes Leukemia (age of onset: 68) in his father.    PSYCHOSOCIAL HISTORY:   Education:   Level Attained: ***   Learning Difficulties: {Endorsed/Denied:62747}   Special Education: {Endorsed/Denied:78231}   Repeated Grade: {Endorsed/Denied:38942}     Vocation:   Highest Attained: ***   Retired: ***    Relationship Status:   : {YES/NO:20267}   : {YES/NO:20267}   Children: ***    MENTAL STATUS AND OBSERVATIONS:  APPEARANCE: Casually dressed and adequate grooming/hygiene.   ALERTNESS/ORIENTATION: Attentive and alert.   GAIT/MOTOR: Unremarkable  SENSORY: ***  SPEECH/LANGUAGE: Normal in rate, rhythm, tone, and volume. No significant word finding difficulty noted. Expressive and receptive language was normal.  STATED MOOD/AFFECT: The patients stated mood was "***." Affect was {Mood:52598}.   INTERPERSONAL BEHAVIOR: Rapport was quickly and easily established   THOUGHT PROCESSES: Thoughts seemed logical and goal-directed.   TEST TAKING BEHAVIOR and VALIDITY: Scores on stand-alone and embedded performance validity measures were ***.  The current results, therefore, are likely a *** " reflection of the patient's current functioning.     TEST RESULTS: The test scores are also included in an appendix to this report.      Mental Status:   2017 MoCA: 17/30 2019 MoCA: /30 2017 MMSE: 28/30 2019 MMSE: /30    Pre-morbid/Baseline: Estimated to be in the ***    Language: ***    Visuospatial: ***    Learning/Memory: ***    Executive Functioning: ***    Motor: ***    Mood: ***    SUMMARY AND IMPRESSION: ***    DIAGNOSIS:  1. ***    RECOMMENDATIONS:  1.  ***    Mr. Greenberg will be provided the results of the evaluation.     Thank you for allowing me to participate in Mr. Waite care.  If you have any questions, please contact me at [phone].    Pablo Vo Psy.D., ABPP  Board Certified in Clinical Neuropsychology  [facility] - Department of Neurology

## 2019-09-09 ENCOUNTER — INITIAL CONSULT (OUTPATIENT)
Dept: NEUROLOGY | Facility: CLINIC | Age: 79
End: 2019-09-09
Payer: MEDICARE

## 2019-09-09 DIAGNOSIS — G31.84 MCI (MILD COGNITIVE IMPAIRMENT): ICD-10-CM

## 2019-09-09 DIAGNOSIS — F32.A DEPRESSIVE DISORDER: ICD-10-CM

## 2019-09-09 DIAGNOSIS — F10.11 ALCOHOL USE DISORDER, MILD, IN SUSTAINED REMISSION: ICD-10-CM

## 2019-09-09 DIAGNOSIS — F41.9 ANXIETY DISORDER, UNSPECIFIED TYPE: ICD-10-CM

## 2019-09-09 PROCEDURE — 96132 PR NEUROPSYCHOLOGIC TEST EVAL SVCS, 1ST HR: ICD-10-PCS | Mod: HCNC,S$GLB,, | Performed by: CLINICAL NEUROPSYCHOLOGIST

## 2019-09-09 PROCEDURE — 96133 NRPSYC TST EVAL PHYS/QHP EA: CPT | Mod: HCNC,S$GLB,, | Performed by: CLINICAL NEUROPSYCHOLOGIST

## 2019-09-09 PROCEDURE — 90791 PSYCH DIAGNOSTIC EVALUATION: CPT | Mod: GT,HCNC,S$GLB, | Performed by: CLINICAL NEUROPSYCHOLOGIST

## 2019-09-09 PROCEDURE — 99499 UNLISTED E&M SERVICE: CPT | Mod: HCNC,S$GLB,, | Performed by: CLINICAL NEUROPSYCHOLOGIST

## 2019-09-09 PROCEDURE — 90791 PR PSYCHIATRIC DIAGNOSTIC EVALUATION: ICD-10-PCS | Mod: GT,HCNC,S$GLB, | Performed by: CLINICAL NEUROPSYCHOLOGIST

## 2019-09-09 PROCEDURE — 96132 NRPSYC TST EVAL PHYS/QHP 1ST: CPT | Mod: HCNC,S$GLB,, | Performed by: CLINICAL NEUROPSYCHOLOGIST

## 2019-09-09 PROCEDURE — 99499 NO LOS: ICD-10-PCS | Mod: HCNC,S$GLB,, | Performed by: CLINICAL NEUROPSYCHOLOGIST

## 2019-09-09 PROCEDURE — 96133 PR NEUROPSYCHOLOGIC TEST EVAL SVCS, EA ADDTL HR: ICD-10-PCS | Mod: HCNC,S$GLB,, | Performed by: CLINICAL NEUROPSYCHOLOGIST
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
## 2019-09-19 ENCOUNTER — LAB VISIT (OUTPATIENT)
Dept: LAB | Facility: HOSPITAL | Age: 79
End: 2019-09-19
Attending: PHYSICIAN ASSISTANT
Payer: MEDICARE

## 2019-09-19 DIAGNOSIS — N18.30 CHRONIC KIDNEY DISEASE, STAGE 3: ICD-10-CM

## 2019-09-19 DIAGNOSIS — Z79.52 LONG TERM CURRENT USE OF SYSTEMIC STEROIDS: ICD-10-CM

## 2019-09-19 DIAGNOSIS — M35.3 PMR (POLYMYALGIA RHEUMATICA): ICD-10-CM

## 2019-09-19 LAB
ALBUMIN SERPL BCP-MCNC: 4 G/DL (ref 3.5–5.2)
ALP SERPL-CCNC: 69 U/L (ref 55–135)
ALT SERPL W/O P-5'-P-CCNC: 17 U/L (ref 10–44)
ANION GAP SERPL CALC-SCNC: 9 MMOL/L (ref 8–16)
AST SERPL-CCNC: 20 U/L (ref 10–40)
BASOPHILS # BLD AUTO: 0.02 K/UL (ref 0–0.2)
BASOPHILS NFR BLD: 0.3 % (ref 0–1.9)
BILIRUB SERPL-MCNC: 0.6 MG/DL (ref 0.1–1)
BUN SERPL-MCNC: 20 MG/DL (ref 8–23)
CALCIUM SERPL-MCNC: 9.5 MG/DL (ref 8.7–10.5)
CHLORIDE SERPL-SCNC: 103 MMOL/L (ref 95–110)
CO2 SERPL-SCNC: 27 MMOL/L (ref 23–29)
CREAT SERPL-MCNC: 1.2 MG/DL (ref 0.5–1.4)
CRP SERPL-MCNC: 3.6 MG/L (ref 0–8.2)
DIFFERENTIAL METHOD: ABNORMAL
EOSINOPHIL # BLD AUTO: 0.1 K/UL (ref 0–0.5)
EOSINOPHIL NFR BLD: 0.8 % (ref 0–8)
ERYTHROCYTE [DISTWIDTH] IN BLOOD BY AUTOMATED COUNT: 12.8 % (ref 11.5–14.5)
ERYTHROCYTE [SEDIMENTATION RATE] IN BLOOD BY WESTERGREN METHOD: 16 MM/HR (ref 0–10)
EST. GFR  (AFRICAN AMERICAN): >60 ML/MIN/1.73 M^2
EST. GFR  (NON AFRICAN AMERICAN): 57.2 ML/MIN/1.73 M^2
GLUCOSE SERPL-MCNC: 295 MG/DL (ref 70–110)
HCT VFR BLD AUTO: 39.3 % (ref 40–54)
HGB BLD-MCNC: 13.4 G/DL (ref 14–18)
LYMPHOCYTES # BLD AUTO: 1.1 K/UL (ref 1–4.8)
LYMPHOCYTES NFR BLD: 13.8 % (ref 18–48)
MCH RBC QN AUTO: 32.6 PG (ref 27–31)
MCHC RBC AUTO-ENTMCNC: 34.1 G/DL (ref 32–36)
MCV RBC AUTO: 96 FL (ref 82–98)
MONOCYTES # BLD AUTO: 0.4 K/UL (ref 0.3–1)
MONOCYTES NFR BLD: 5.4 % (ref 4–15)
NEUTROPHILS # BLD AUTO: 6.1 K/UL (ref 1.8–7.7)
NEUTROPHILS NFR BLD: 79.7 % (ref 38–73)
PLATELET # BLD AUTO: 267 K/UL (ref 150–350)
PMV BLD AUTO: 10.6 FL (ref 9.2–12.9)
POTASSIUM SERPL-SCNC: 4.4 MMOL/L (ref 3.5–5.1)
PROT SERPL-MCNC: 7.2 G/DL (ref 6–8.4)
RBC # BLD AUTO: 4.11 M/UL (ref 4.6–6.2)
SODIUM SERPL-SCNC: 139 MMOL/L (ref 136–145)
WBC # BLD AUTO: 7.66 K/UL (ref 3.9–12.7)

## 2019-09-19 PROCEDURE — 36415 COLL VENOUS BLD VENIPUNCTURE: CPT | Mod: HCNC,PO

## 2019-09-19 PROCEDURE — 86140 C-REACTIVE PROTEIN: CPT | Mod: HCNC

## 2019-09-19 PROCEDURE — 85025 COMPLETE CBC W/AUTO DIFF WBC: CPT | Mod: HCNC,PO

## 2019-09-19 PROCEDURE — 80053 COMPREHEN METABOLIC PANEL: CPT | Mod: HCNC

## 2019-09-19 PROCEDURE — 85651 RBC SED RATE NONAUTOMATED: CPT | Mod: HCNC,PO

## 2019-09-29 RX ORDER — DICLOFENAC SODIUM 10 MG/G
2 GEL TOPICAL 4 TIMES DAILY PRN
Qty: 100 G | Refills: 5 | Status: SHIPPED | OUTPATIENT
Start: 2019-09-29 | End: 2019-09-30 | Stop reason: SDUPTHER

## 2019-09-30 ENCOUNTER — OFFICE VISIT (OUTPATIENT)
Dept: RHEUMATOLOGY | Facility: CLINIC | Age: 79
End: 2019-09-30
Payer: MEDICARE

## 2019-09-30 VITALS
HEART RATE: 69 BPM | WEIGHT: 174.19 LBS | HEIGHT: 74 IN | SYSTOLIC BLOOD PRESSURE: 149 MMHG | DIASTOLIC BLOOD PRESSURE: 71 MMHG | BODY MASS INDEX: 22.35 KG/M2

## 2019-09-30 DIAGNOSIS — G20.C PRIMARY PARKINSONISM: ICD-10-CM

## 2019-09-30 DIAGNOSIS — M17.12 PRIMARY OSTEOARTHRITIS OF LEFT KNEE: ICD-10-CM

## 2019-09-30 DIAGNOSIS — M35.3 PMR (POLYMYALGIA RHEUMATICA): Primary | ICD-10-CM

## 2019-09-30 DIAGNOSIS — N18.30 CHRONIC KIDNEY DISEASE, STAGE 3: ICD-10-CM

## 2019-09-30 DIAGNOSIS — M15.9 PRIMARY OSTEOARTHRITIS INVOLVING MULTIPLE JOINTS: ICD-10-CM

## 2019-09-30 DIAGNOSIS — M48.062 SPINAL STENOSIS OF LUMBAR REGION WITH NEUROGENIC CLAUDICATION: ICD-10-CM

## 2019-09-30 DIAGNOSIS — R26.9 GAIT ABNORMALITY: ICD-10-CM

## 2019-09-30 PROCEDURE — 99214 OFFICE O/P EST MOD 30 MIN: CPT | Mod: HCNC,S$GLB,, | Performed by: PHYSICIAN ASSISTANT

## 2019-09-30 PROCEDURE — 3077F PR MOST RECENT SYSTOLIC BLOOD PRESSURE >= 140 MM HG: ICD-10-PCS | Mod: HCNC,CPTII,S$GLB, | Performed by: PHYSICIAN ASSISTANT

## 2019-09-30 PROCEDURE — 3078F DIAST BP <80 MM HG: CPT | Mod: HCNC,CPTII,S$GLB, | Performed by: PHYSICIAN ASSISTANT

## 2019-09-30 PROCEDURE — 1101F PR PT FALLS ASSESS DOC 0-1 FALLS W/OUT INJ PAST YR: ICD-10-PCS | Mod: HCNC,CPTII,S$GLB, | Performed by: PHYSICIAN ASSISTANT

## 2019-09-30 PROCEDURE — 1101F PT FALLS ASSESS-DOCD LE1/YR: CPT | Mod: HCNC,CPTII,S$GLB, | Performed by: PHYSICIAN ASSISTANT

## 2019-09-30 PROCEDURE — 99999 PR PBB SHADOW E&M-EST. PATIENT-LVL III: ICD-10-PCS | Mod: PBBFAC,HCNC,, | Performed by: PHYSICIAN ASSISTANT

## 2019-09-30 PROCEDURE — 3078F PR MOST RECENT DIASTOLIC BLOOD PRESSURE < 80 MM HG: ICD-10-PCS | Mod: HCNC,CPTII,S$GLB, | Performed by: PHYSICIAN ASSISTANT

## 2019-09-30 PROCEDURE — 99214 PR OFFICE/OUTPT VISIT, EST, LEVL IV, 30-39 MIN: ICD-10-PCS | Mod: HCNC,S$GLB,, | Performed by: PHYSICIAN ASSISTANT

## 2019-09-30 PROCEDURE — 99999 PR PBB SHADOW E&M-EST. PATIENT-LVL III: CPT | Mod: PBBFAC,HCNC,, | Performed by: PHYSICIAN ASSISTANT

## 2019-09-30 PROCEDURE — 3077F SYST BP >= 140 MM HG: CPT | Mod: HCNC,CPTII,S$GLB, | Performed by: PHYSICIAN ASSISTANT

## 2019-09-30 RX ORDER — PREDNISONE 2.5 MG/1
2.5 TABLET ORAL 2 TIMES DAILY
Qty: 180 TABLET | Refills: 3 | Status: SHIPPED | OUTPATIENT
Start: 2019-09-30 | End: 2020-11-13

## 2019-09-30 RX ORDER — DICLOFENAC SODIUM 10 MG/G
2 GEL TOPICAL 4 TIMES DAILY PRN
Qty: 100 G | Refills: 5 | Status: SHIPPED | OUTPATIENT
Start: 2019-09-30 | End: 2020-11-12

## 2019-09-30 NOTE — PROGRESS NOTES
Subjective:       Patient ID: Jagdeep Greenberg is a 79 y.o. male.    Chief Complaint: PMR and Osteoarthritis    Jagdeep is here for rheum follow up. PMR and OA    Had some lumbar stenosis with radicular pain - had L3-S1 facet injections done a few days ago. So far pain is much better. Had weakness, will see if this has any affect on that. He has underlying Parkinson dx 2 yrs ago; see dr lang at [facility]; on sinemet and for memory aricept    No recent med changes.  Last lumbar x-ray done in 2017 showed findings: There is mild dextroscoliotic curvature of the lumbar spine.  Vertebral body heights are well-maintained.  No spondylolisthesis demonstrated. There is moderate disc height loss at L5-S1 with associated osteophyte production. Posterior elements appear grossly intact. No acute fractures or subluxations are demonstrated.  The remaining visualized osseous and soft tissue structures demonstrate no appreciable abnormality  Saw interventional provider had injection done; it has def reduced his pain some in his lower back.      For his PMR now on Prednisone dose right now is on 5 mg bid;   We have weaned down and off a few times in the past but had recurrence of symptoms. Last month had exacerbation so pred up to 5 mg bid. Feeling good. No joint pain or stiffness. Today  pain level rated at 3/10.        Unable to take nonsteroidals because of chronic kidney disease.  In the past uses Tylenol when necessary for pain.  Still taking turmeric  1000 mg bid, glucosamine,  and fish oil as well.      Dx with Parkinson's by neurology-  Now on sinemet tid, doing well just a little gait/imbalane issue. Some mild lower ext weakness.     dexa normal 2014- repeated today 6/6/18- normal     Osteoarthritis   Associated symptoms include arthralgias and weakness. Pertinent negatives include no abdominal pain, chest pain, chills, congestion, coughing, fatigue, fever, joint swelling, myalgias, nausea, neck pain,  "numbness, rash or vomiting.   Joint Pain   Associated symptoms include arthralgias and weakness. Pertinent negatives include no abdominal pain, chest pain, chills, congestion, coughing, fatigue, fever, joint swelling, myalgias, nausea, neck pain, numbness, rash or vomiting.       Review of Systems   Constitutional: Negative.  Negative for chills, fatigue and fever.   HENT: Negative.  Negative for congestion, mouth sores and trouble swallowing.    Eyes: Negative.  Negative for photophobia, redness and visual disturbance.   Respiratory: Negative.  Negative for cough, shortness of breath and wheezing.    Cardiovascular: Negative.  Negative for chest pain and leg swelling.   Gastrointestinal: Negative.  Negative for abdominal distention, abdominal pain, diarrhea, nausea and vomiting.   Genitourinary: Negative.  Negative for dysuria, flank pain, frequency and urgency.   Musculoskeletal: Positive for arthralgias and gait problem. Negative for back pain, joint swelling, myalgias, neck pain and neck stiffness.   Skin: Negative.  Negative for rash.   Neurological: Positive for weakness. Negative for dizziness and numbness.         Objective:   BP (!) 149/71   Pulse 69   Ht 6' 2" (1.88 m)   Wt 79 kg (174 lb 2.6 oz)   BMI 22.36 kg/m²      Physical Exam   Constitutional: He is oriented to person, place, and time and well-developed, well-nourished, and in no distress. No distress.   HENT:   Head: Normocephalic and atraumatic.   Right Ear: External ear normal.   Left Ear: External ear normal.   Mouth/Throat: No oropharyngeal exudate.   Eyes: Conjunctivae and EOM are normal. Pupils are equal, round, and reactive to light. No scleral icterus.   Neck: Neck supple. No thyromegaly present.   Cardiovascular: Normal rate, regular rhythm and normal heart sounds.    No murmur heard.  Pulmonary/Chest: Effort normal and breath sounds normal. No respiratory distress.   Abdominal: Soft. Bowel sounds are normal.       Right Side " Rheumatological Exam     Examination finds the shoulder, elbow, wrist, knee, 1st PIP, 1st MCP, 2nd PIP, 2nd MCP, 3rd PIP, 3rd MCP, 4th PIP, 4th MCP, 5th PIP and 5th MCP normal.    Muscle Strength (0-5 scale):  Deltoid:  5  Biceps: 5/5   Triceps:  5  : 4/5   Iliopsoas: 4.8  Quadriceps:  4.8     Left Side Rheumatological Exam     Examination finds the shoulder, elbow, wrist, knee, 1st PIP, 1st MCP, 2nd PIP, 2nd MCP, 3rd PIP, 3rd MCP, 4th PIP, 4th MCP, 5th PIP and 5th MCP normal.    Muscle Strength (0-5 scale):  Deltoid:  5  Biceps: 5/5   Triceps:  5  :  4/5   Iliopsoas: 4.8  Quadriceps:  4.8       Lymphadenopathy:     He has no cervical adenopathy.   Neurological: He is alert and oriented to person, place, and time. No cranial nerve deficit. He exhibits normal muscle tone. Gait normal. Coordination normal.   Skin: Skin is warm and dry.     Musculoskeletal: Normal range of motion. He exhibits deformity. He exhibits no edema or tenderness.   No triggering today  He does have early Dupuytren's contracture bilateral third fingers  No synovitis on exam            Recent Results (from the past 840 hour(s))   CBC auto differential    Collection Time: 09/19/19 10:35 AM   Result Value Ref Range    WBC 7.66 3.90 - 12.70 K/uL    RBC 4.11 (L) 4.60 - 6.20 M/uL    Hemoglobin 13.4 (L) 14.0 - 18.0 g/dL    Hematocrit 39.3 (L) 40.0 - 54.0 %    Mean Corpuscular Volume 96 82 - 98 fL    Mean Corpuscular Hemoglobin 32.6 (H) 27.0 - 31.0 pg    Mean Corpuscular Hemoglobin Conc 34.1 32.0 - 36.0 g/dL    RDW 12.8 11.5 - 14.5 %    Platelets 267 150 - 350 K/uL    MPV 10.6 9.2 - 12.9 fL    Gran # (ANC) 6.1 1.8 - 7.7 K/uL    Lymph # 1.1 1.0 - 4.8 K/uL    Mono # 0.4 0.3 - 1.0 K/uL    Eos # 0.1 0.0 - 0.5 K/uL    Baso # 0.02 0.00 - 0.20 K/uL    Gran% 79.7 (H) 38.0 - 73.0 %    Lymph% 13.8 (L) 18.0 - 48.0 %    Mono% 5.4 4.0 - 15.0 %    Eosinophil% 0.8 0.0 - 8.0 %    Basophil% 0.3 0.0 - 1.9 %    Differential Method Automated    Comprehensive  metabolic panel    Collection Time: 09/19/19 10:35 AM   Result Value Ref Range    Sodium 139 136 - 145 mmol/L    Potassium 4.4 3.5 - 5.1 mmol/L    Chloride 103 95 - 110 mmol/L    CO2 27 23 - 29 mmol/L    Glucose 295 (H) 70 - 110 mg/dL    BUN, Bld 20 8 - 23 mg/dL    Creatinine 1.2 0.5 - 1.4 mg/dL    Calcium 9.5 8.7 - 10.5 mg/dL    Total Protein 7.2 6.0 - 8.4 g/dL    Albumin 4.0 3.5 - 5.2 g/dL    Total Bilirubin 0.6 0.1 - 1.0 mg/dL    Alkaline Phosphatase 69 55 - 135 U/L    AST 20 10 - 40 U/L    ALT 17 10 - 44 U/L    Anion Gap 9 8 - 16 mmol/L    eGFR if African American >60.0 >60 mL/min/1.73 m^2    eGFR if non  57.2 (A) >60 mL/min/1.73 m^2   C-reactive protein    Collection Time: 09/19/19 10:35 AM   Result Value Ref Range    CRP 3.6 0.0 - 8.2 mg/L   Sedimentation rate, manual    Collection Time: 09/19/19 10:35 AM   Result Value Ref Range    Sed Rate 16 (H) 0 - 10 mm/Hr                Assessment:       1. PMR (polymyalgia rheumatica)    2. Primary osteoarthritis involving multiple joints    3. Spinal stenosis of lumbar region with neurogenic claudication    4. Primary Parkinsonism    5. Chronic kidney disease, stage 3    6. Gait abnormality          1. PMR- recent recurrence now back on pred 5 mg bid doing well     In the past weaned off X 3 separate time but had recurrence when off prednisone      2. Lumbar stenosis with Lower back pain and radic pain   Post L3-S1 facet injection- Pain better     3. CKD and DM  not able to take nsaids- renal function looks good eGFR 53    4. OA generalized    5. Dupuytren's contracture- early and mild will middle flexor tendon    6. Long term steroid use - normal dexa 2014- repeat dexa 6/6/18- normal with stable bmd compared back to 2014    7.  parkinson gait/imbalance on sinement- seeing neurology Dr Harden     Plan:         Will slowly wean his pred and goal to keep him on low dose for a while ~2.5 mg/d    Cut back on prednisone to  2.5 mg bid     See me back in 9-10  weeks for check up     Repeat dexa 3-4 yrs    Topical voltaren gel qid prn     Continue to Try tummeric 1500 mg  Continue glucosamine and fish oil  Tylenol for pain , avoid nonsteroidals    At home exercise for dupuytren contracture, paraffin and range of motion exercises      F/U with neurology and interventional pain provider    rtc 12 weeks with ambar and reg 4 labs

## 2019-11-19 RX ORDER — AMLODIPINE BESYLATE 10 MG/1
TABLET ORAL
Qty: 90 TABLET | Refills: 0 | Status: SHIPPED | OUTPATIENT
Start: 2019-11-19 | End: 2020-02-06

## 2019-11-22 ENCOUNTER — LAB VISIT (OUTPATIENT)
Dept: LAB | Facility: HOSPITAL | Age: 79
End: 2019-11-22
Attending: FAMILY MEDICINE
Payer: MEDICARE

## 2019-11-22 DIAGNOSIS — I10 HYPERTENSION, UNSPECIFIED TYPE: ICD-10-CM

## 2019-11-22 DIAGNOSIS — E11.9 TYPE 2 DIABETES MELLITUS WITHOUT COMPLICATION, WITHOUT LONG-TERM CURRENT USE OF INSULIN: ICD-10-CM

## 2019-11-22 DIAGNOSIS — E78.5 HYPERLIPIDEMIA, UNSPECIFIED HYPERLIPIDEMIA TYPE: ICD-10-CM

## 2019-11-22 LAB
ALBUMIN SERPL BCP-MCNC: 4 G/DL (ref 3.5–5.2)
ALP SERPL-CCNC: 54 U/L (ref 55–135)
ALT SERPL W/O P-5'-P-CCNC: 16 U/L (ref 10–44)
ANION GAP SERPL CALC-SCNC: 9 MMOL/L (ref 8–16)
AST SERPL-CCNC: 22 U/L (ref 10–40)
BILIRUB SERPL-MCNC: 0.7 MG/DL (ref 0.1–1)
BUN SERPL-MCNC: 17 MG/DL (ref 8–23)
CALCIUM SERPL-MCNC: 10 MG/DL (ref 8.7–10.5)
CHLORIDE SERPL-SCNC: 105 MMOL/L (ref 95–110)
CHOLEST SERPL-MCNC: 171 MG/DL (ref 120–199)
CHOLEST/HDLC SERPL: 4.1 {RATIO} (ref 2–5)
CO2 SERPL-SCNC: 27 MMOL/L (ref 23–29)
CREAT SERPL-MCNC: 1.4 MG/DL (ref 0.5–1.4)
EST. GFR  (AFRICAN AMERICAN): 54.9 ML/MIN/1.73 M^2
EST. GFR  (NON AFRICAN AMERICAN): 47.4 ML/MIN/1.73 M^2
ESTIMATED AVG GLUCOSE: 174 MG/DL (ref 68–131)
GLUCOSE SERPL-MCNC: 279 MG/DL (ref 70–110)
HBA1C MFR BLD HPLC: 7.7 % (ref 4–5.6)
HDLC SERPL-MCNC: 42 MG/DL (ref 40–75)
HDLC SERPL: 24.6 % (ref 20–50)
LDLC SERPL CALC-MCNC: 97 MG/DL (ref 63–159)
NONHDLC SERPL-MCNC: 129 MG/DL
POTASSIUM SERPL-SCNC: 4.1 MMOL/L (ref 3.5–5.1)
PROT SERPL-MCNC: 6.9 G/DL (ref 6–8.4)
SODIUM SERPL-SCNC: 141 MMOL/L (ref 136–145)
TRIGL SERPL-MCNC: 160 MG/DL (ref 30–150)

## 2019-11-22 PROCEDURE — 36415 COLL VENOUS BLD VENIPUNCTURE: CPT | Mod: HCNC,PO

## 2019-11-22 PROCEDURE — 80053 COMPREHEN METABOLIC PANEL: CPT | Mod: HCNC

## 2019-11-22 PROCEDURE — 80061 LIPID PANEL: CPT | Mod: HCNC

## 2019-11-22 PROCEDURE — 83036 HEMOGLOBIN GLYCOSYLATED A1C: CPT | Mod: HCNC

## 2019-11-25 DIAGNOSIS — E11.40 TYPE 2 DIABETES MELLITUS WITH DIABETIC NEUROPATHY, WITHOUT LONG-TERM CURRENT USE OF INSULIN: ICD-10-CM

## 2019-11-25 DIAGNOSIS — N28.9 FUNCTION KIDNEY DECREASED: Primary | ICD-10-CM

## 2019-12-27 ENCOUNTER — LAB VISIT (OUTPATIENT)
Dept: LAB | Facility: HOSPITAL | Age: 79
End: 2019-12-27
Attending: PHYSICIAN ASSISTANT
Payer: MEDICARE

## 2019-12-27 DIAGNOSIS — M35.3 PMR (POLYMYALGIA RHEUMATICA): ICD-10-CM

## 2019-12-27 DIAGNOSIS — Z79.52 LONG TERM CURRENT USE OF SYSTEMIC STEROIDS: ICD-10-CM

## 2019-12-27 DIAGNOSIS — N18.30 CHRONIC KIDNEY DISEASE, STAGE 3: ICD-10-CM

## 2019-12-27 LAB
ALBUMIN SERPL BCP-MCNC: 4.1 G/DL (ref 3.5–5.2)
ALP SERPL-CCNC: 66 U/L (ref 55–135)
ALT SERPL W/O P-5'-P-CCNC: 9 U/L (ref 10–44)
ANION GAP SERPL CALC-SCNC: 7 MMOL/L (ref 8–16)
AST SERPL-CCNC: 16 U/L (ref 10–40)
BASOPHILS # BLD AUTO: 0.02 K/UL (ref 0–0.2)
BASOPHILS NFR BLD: 0.2 % (ref 0–1.9)
BILIRUB SERPL-MCNC: 0.7 MG/DL (ref 0.1–1)
BUN SERPL-MCNC: 21 MG/DL (ref 8–23)
CALCIUM SERPL-MCNC: 9.7 MG/DL (ref 8.7–10.5)
CHLORIDE SERPL-SCNC: 105 MMOL/L (ref 95–110)
CO2 SERPL-SCNC: 27 MMOL/L (ref 23–29)
CREAT SERPL-MCNC: 1.5 MG/DL (ref 0.5–1.4)
CRP SERPL-MCNC: 1.4 MG/L (ref 0–8.2)
DIFFERENTIAL METHOD: ABNORMAL
EOSINOPHIL # BLD AUTO: 0.1 K/UL (ref 0–0.5)
EOSINOPHIL NFR BLD: 0.8 % (ref 0–8)
ERYTHROCYTE [DISTWIDTH] IN BLOOD BY AUTOMATED COUNT: 11.9 % (ref 11.5–14.5)
ERYTHROCYTE [SEDIMENTATION RATE] IN BLOOD BY WESTERGREN METHOD: 1 MM/HR (ref 0–10)
EST. GFR  (AFRICAN AMERICAN): 50.5 ML/MIN/1.73 M^2
EST. GFR  (NON AFRICAN AMERICAN): 43.7 ML/MIN/1.73 M^2
GLUCOSE SERPL-MCNC: 315 MG/DL (ref 70–110)
HCT VFR BLD AUTO: 40.2 % (ref 40–54)
HGB BLD-MCNC: 13.9 G/DL (ref 14–18)
LYMPHOCYTES # BLD AUTO: 1.2 K/UL (ref 1–4.8)
LYMPHOCYTES NFR BLD: 13.9 % (ref 18–48)
MCH RBC QN AUTO: 32.5 PG (ref 27–31)
MCHC RBC AUTO-ENTMCNC: 34.6 G/DL (ref 32–36)
MCV RBC AUTO: 94 FL (ref 82–98)
MONOCYTES # BLD AUTO: 0.6 K/UL (ref 0.3–1)
MONOCYTES NFR BLD: 6.8 % (ref 4–15)
NEUTROPHILS # BLD AUTO: 6.4 K/UL (ref 1.8–7.7)
NEUTROPHILS NFR BLD: 78.3 % (ref 38–73)
PLATELET # BLD AUTO: 255 K/UL (ref 150–350)
PMV BLD AUTO: 10.9 FL (ref 9.2–12.9)
POTASSIUM SERPL-SCNC: 4.2 MMOL/L (ref 3.5–5.1)
PROT SERPL-MCNC: 6.9 G/DL (ref 6–8.4)
RBC # BLD AUTO: 4.28 M/UL (ref 4.6–6.2)
SODIUM SERPL-SCNC: 139 MMOL/L (ref 136–145)
WBC # BLD AUTO: 8.25 K/UL (ref 3.9–12.7)

## 2019-12-27 PROCEDURE — 85025 COMPLETE CBC W/AUTO DIFF WBC: CPT | Mod: HCNC,PO

## 2019-12-27 PROCEDURE — 36415 COLL VENOUS BLD VENIPUNCTURE: CPT | Mod: HCNC,PO

## 2019-12-27 PROCEDURE — 80053 COMPREHEN METABOLIC PANEL: CPT | Mod: HCNC

## 2019-12-27 PROCEDURE — 85651 RBC SED RATE NONAUTOMATED: CPT | Mod: HCNC,PO

## 2019-12-27 PROCEDURE — 86140 C-REACTIVE PROTEIN: CPT | Mod: HCNC

## 2020-01-01 RX ORDER — CARBIDOPA AND LEVODOPA 25; 100 MG/1; MG/1
TABLET ORAL
Qty: 135 TABLET | Refills: 3 | Status: SHIPPED | OUTPATIENT
Start: 2020-01-01 | End: 2020-02-17

## 2020-01-16 RX ORDER — GLIMEPIRIDE 2 MG/1
TABLET ORAL
Qty: 90 TABLET | Refills: 0 | Status: SHIPPED | OUTPATIENT
Start: 2020-01-16 | End: 2020-04-21

## 2020-02-03 ENCOUNTER — OFFICE VISIT (OUTPATIENT)
Dept: RHEUMATOLOGY | Facility: CLINIC | Age: 80
End: 2020-02-03
Payer: MEDICARE

## 2020-02-03 ENCOUNTER — LAB VISIT (OUTPATIENT)
Dept: LAB | Facility: HOSPITAL | Age: 80
End: 2020-02-03
Attending: INTERNAL MEDICINE
Payer: MEDICARE

## 2020-02-03 VITALS
HEIGHT: 74 IN | BODY MASS INDEX: 22.78 KG/M2 | WEIGHT: 177.5 LBS | SYSTOLIC BLOOD PRESSURE: 168 MMHG | HEART RATE: 67 BPM | DIASTOLIC BLOOD PRESSURE: 84 MMHG

## 2020-02-03 DIAGNOSIS — M17.12 PRIMARY OSTEOARTHRITIS OF LEFT KNEE: ICD-10-CM

## 2020-02-03 DIAGNOSIS — E11.22 TYPE 2 DIABETES MELLITUS WITH STAGE 3 CHRONIC KIDNEY DISEASE: ICD-10-CM

## 2020-02-03 DIAGNOSIS — N18.30 TYPE 2 DIABETES MELLITUS WITH STAGE 3 CHRONIC KIDNEY DISEASE: ICD-10-CM

## 2020-02-03 DIAGNOSIS — M54.16 LUMBAR RADICULOPATHY: ICD-10-CM

## 2020-02-03 DIAGNOSIS — M51.36 DDD (DEGENERATIVE DISC DISEASE), LUMBAR: ICD-10-CM

## 2020-02-03 DIAGNOSIS — M25.50 PAIN, JOINT, MULTIPLE SITES: ICD-10-CM

## 2020-02-03 DIAGNOSIS — M15.9 PRIMARY OSTEOARTHRITIS INVOLVING MULTIPLE JOINTS: ICD-10-CM

## 2020-02-03 DIAGNOSIS — M35.3 PMR (POLYMYALGIA RHEUMATICA): Primary | ICD-10-CM

## 2020-02-03 DIAGNOSIS — G20.C PRIMARY PARKINSONISM: ICD-10-CM

## 2020-02-03 DIAGNOSIS — M35.3 PMR (POLYMYALGIA RHEUMATICA): ICD-10-CM

## 2020-02-03 DIAGNOSIS — M72.0 DUPUYTREN'S CONTRACTURE OF BOTH HANDS: ICD-10-CM

## 2020-02-03 DIAGNOSIS — M48.062 SPINAL STENOSIS OF LUMBAR REGION WITH NEUROGENIC CLAUDICATION: ICD-10-CM

## 2020-02-03 DIAGNOSIS — N18.30 CHRONIC KIDNEY DISEASE, STAGE 3: ICD-10-CM

## 2020-02-03 DIAGNOSIS — R26.9 GAIT ABNORMALITY: ICD-10-CM

## 2020-02-03 LAB
ALBUMIN SERPL BCP-MCNC: 4.2 G/DL (ref 3.5–5.2)
ALP SERPL-CCNC: 64 U/L (ref 55–135)
ALT SERPL W/O P-5'-P-CCNC: 32 U/L (ref 10–44)
ANION GAP SERPL CALC-SCNC: 9 MMOL/L (ref 8–16)
AST SERPL-CCNC: 20 U/L (ref 10–40)
BASOPHILS # BLD AUTO: 0.04 K/UL (ref 0–0.2)
BASOPHILS NFR BLD: 0.6 % (ref 0–1.9)
BILIRUB SERPL-MCNC: 0.6 MG/DL (ref 0.1–1)
BUN SERPL-MCNC: 18 MG/DL (ref 8–23)
CALCIUM SERPL-MCNC: 9.6 MG/DL (ref 8.7–10.5)
CHLORIDE SERPL-SCNC: 107 MMOL/L (ref 95–110)
CO2 SERPL-SCNC: 27 MMOL/L (ref 23–29)
CREAT SERPL-MCNC: 1.1 MG/DL (ref 0.5–1.4)
CRP SERPL-MCNC: 0.9 MG/L (ref 0–8.2)
DIFFERENTIAL METHOD: ABNORMAL
EOSINOPHIL # BLD AUTO: 0.1 K/UL (ref 0–0.5)
EOSINOPHIL NFR BLD: 1.2 % (ref 0–8)
ERYTHROCYTE [DISTWIDTH] IN BLOOD BY AUTOMATED COUNT: 12.1 % (ref 11.5–14.5)
ERYTHROCYTE [SEDIMENTATION RATE] IN BLOOD BY WESTERGREN METHOD: <2 MM/HR (ref 0–23)
EST. GFR  (AFRICAN AMERICAN): >60 ML/MIN/1.73 M^2
EST. GFR  (NON AFRICAN AMERICAN): >60 ML/MIN/1.73 M^2
GLUCOSE SERPL-MCNC: 182 MG/DL (ref 70–110)
HCT VFR BLD AUTO: 43 % (ref 40–54)
HGB BLD-MCNC: 14.8 G/DL (ref 14–18)
IMM GRANULOCYTES # BLD AUTO: 0.01 K/UL (ref 0–0.04)
IMM GRANULOCYTES NFR BLD AUTO: 0.1 % (ref 0–0.5)
LYMPHOCYTES # BLD AUTO: 1.8 K/UL (ref 1–4.8)
LYMPHOCYTES NFR BLD: 25.2 % (ref 18–48)
MCH RBC QN AUTO: 33.5 PG (ref 27–31)
MCHC RBC AUTO-ENTMCNC: 34.4 G/DL (ref 32–36)
MCV RBC AUTO: 97 FL (ref 82–98)
MONOCYTES # BLD AUTO: 0.6 K/UL (ref 0.3–1)
MONOCYTES NFR BLD: 8 % (ref 4–15)
NEUTROPHILS # BLD AUTO: 4.7 K/UL (ref 1.8–7.7)
NEUTROPHILS NFR BLD: 65 % (ref 38–73)
NRBC BLD-RTO: 0 /100 WBC
PLATELET # BLD AUTO: 283 K/UL (ref 150–350)
PMV BLD AUTO: 10.4 FL (ref 9.2–12.9)
POTASSIUM SERPL-SCNC: 4.2 MMOL/L (ref 3.5–5.1)
PROT SERPL-MCNC: 7.1 G/DL (ref 6–8.4)
RBC # BLD AUTO: 4.42 M/UL (ref 4.6–6.2)
SODIUM SERPL-SCNC: 143 MMOL/L (ref 136–145)
WBC # BLD AUTO: 7.27 K/UL (ref 3.9–12.7)

## 2020-02-03 PROCEDURE — 99999 PR PBB SHADOW E&M-EST. PATIENT-LVL IV: ICD-10-PCS | Mod: PBBFAC,HCNC,, | Performed by: PHYSICIAN ASSISTANT

## 2020-02-03 PROCEDURE — 85025 COMPLETE CBC W/AUTO DIFF WBC: CPT | Mod: HCNC

## 2020-02-03 PROCEDURE — 1159F MED LIST DOCD IN RCRD: CPT | Mod: HCNC,S$GLB,, | Performed by: PHYSICIAN ASSISTANT

## 2020-02-03 PROCEDURE — 3079F PR MOST RECENT DIASTOLIC BLOOD PRESSURE 80-89 MM HG: ICD-10-PCS | Mod: HCNC,CPTII,S$GLB, | Performed by: PHYSICIAN ASSISTANT

## 2020-02-03 PROCEDURE — 1126F AMNT PAIN NOTED NONE PRSNT: CPT | Mod: HCNC,S$GLB,, | Performed by: PHYSICIAN ASSISTANT

## 2020-02-03 PROCEDURE — 99214 PR OFFICE/OUTPT VISIT, EST, LEVL IV, 30-39 MIN: ICD-10-PCS | Mod: HCNC,S$GLB,, | Performed by: PHYSICIAN ASSISTANT

## 2020-02-03 PROCEDURE — 3288F FALL RISK ASSESSMENT DOCD: CPT | Mod: HCNC,CPTII,S$GLB, | Performed by: PHYSICIAN ASSISTANT

## 2020-02-03 PROCEDURE — 3288F PR FALLS RISK ASSESSMENT DOCUMENTED: ICD-10-PCS | Mod: HCNC,CPTII,S$GLB, | Performed by: PHYSICIAN ASSISTANT

## 2020-02-03 PROCEDURE — 36415 COLL VENOUS BLD VENIPUNCTURE: CPT | Mod: HCNC

## 2020-02-03 PROCEDURE — 80053 COMPREHEN METABOLIC PANEL: CPT | Mod: HCNC

## 2020-02-03 PROCEDURE — 3079F DIAST BP 80-89 MM HG: CPT | Mod: HCNC,CPTII,S$GLB, | Performed by: PHYSICIAN ASSISTANT

## 2020-02-03 PROCEDURE — 1100F PR PT FALLS ASSESS DOC 2+ FALLS/FALL W/INJURY/YR: ICD-10-PCS | Mod: HCNC,CPTII,S$GLB, | Performed by: PHYSICIAN ASSISTANT

## 2020-02-03 PROCEDURE — 3077F SYST BP >= 140 MM HG: CPT | Mod: HCNC,CPTII,S$GLB, | Performed by: PHYSICIAN ASSISTANT

## 2020-02-03 PROCEDURE — 99999 PR PBB SHADOW E&M-EST. PATIENT-LVL IV: CPT | Mod: PBBFAC,HCNC,, | Performed by: PHYSICIAN ASSISTANT

## 2020-02-03 PROCEDURE — 1100F PTFALLS ASSESS-DOCD GE2>/YR: CPT | Mod: HCNC,CPTII,S$GLB, | Performed by: PHYSICIAN ASSISTANT

## 2020-02-03 PROCEDURE — 85652 RBC SED RATE AUTOMATED: CPT | Mod: HCNC

## 2020-02-03 PROCEDURE — 99214 OFFICE O/P EST MOD 30 MIN: CPT | Mod: HCNC,S$GLB,, | Performed by: PHYSICIAN ASSISTANT

## 2020-02-03 PROCEDURE — 3077F PR MOST RECENT SYSTOLIC BLOOD PRESSURE >= 140 MM HG: ICD-10-PCS | Mod: HCNC,CPTII,S$GLB, | Performed by: PHYSICIAN ASSISTANT

## 2020-02-03 PROCEDURE — 1159F PR MEDICATION LIST DOCUMENTED IN MEDICAL RECORD: ICD-10-PCS | Mod: HCNC,S$GLB,, | Performed by: PHYSICIAN ASSISTANT

## 2020-02-03 PROCEDURE — 1126F PR PAIN SEVERITY QUANTIFIED, NO PAIN PRESENT: ICD-10-PCS | Mod: HCNC,S$GLB,, | Performed by: PHYSICIAN ASSISTANT

## 2020-02-03 PROCEDURE — 86140 C-REACTIVE PROTEIN: CPT | Mod: HCNC

## 2020-02-03 NOTE — PROGRESS NOTES
Subjective:       Patient ID: Jagdeep Greenberg is a 79 y.o. male.    Chief Complaint: Follow-up    Jagdeep is here for rheum follow up. PMR and OA    PMR now on pred 2.5 mg bid- doing well; last 2 esr have been 11 & 16    biggest issues is OA knees and spine     Had some lumbar stenosis with radicular pain -Last lumbar x-ray done in 2017 showed findings: There is mild dextroscoliotic curvature of the lumbar spine.  Vertebral body heights are well-maintained.  No spondylolisthesis demonstrated. There is moderate disc height loss at L5-S1 with associated osteophyte production. Posterior elements appear grossly intact. No acute fractures or subluxations are demonstrated.  The remaining visualized osseous and soft tissue structures demonstrate no appreciable abnormality   had L3-S1 facet injections - only mild help . Chronic gen weakness related to parkinson and spinal issues Parkinson dx 2 yrs ago; see dr lang at [facility]; on sinemet and for memory aricept    No recent med changes.  Unable to take nonsteroidals because of chronic kidney disease.  In the past uses Tylenol when necessary for pain.  Still taking turmeric  1000 mg bid, glucosamine,  and fish oil as well.       Today  pain level rated at 0/10.         dexa normal 2014- repeated today 6/6/18- normal     Osteoarthritis   Associated symptoms include arthralgias and weakness. Pertinent negatives include no abdominal pain, chest pain, chills, congestion, coughing, fatigue, fever, joint swelling, myalgias, nausea, neck pain, numbness, rash or vomiting.   Joint Pain   Associated symptoms include arthralgias and weakness. Pertinent negatives include no abdominal pain, chest pain, chills, congestion, coughing, fatigue, fever, joint swelling, myalgias, nausea, neck pain, numbness, rash or vomiting.   Follow-up   Associated symptoms include arthralgias and weakness. Pertinent negatives include no abdominal pain, chest pain, chills, congestion,  "coughing, fatigue, fever, joint swelling, myalgias, nausea, neck pain, numbness, rash or vomiting.       Review of Systems   Constitutional: Negative.  Negative for chills, fatigue and fever.   HENT: Negative.  Negative for congestion, mouth sores and trouble swallowing.    Eyes: Negative.  Negative for photophobia, redness and visual disturbance.   Respiratory: Negative.  Negative for cough, shortness of breath and wheezing.    Cardiovascular: Negative.  Negative for chest pain and leg swelling.   Gastrointestinal: Negative.  Negative for abdominal distention, abdominal pain, diarrhea, nausea and vomiting.   Genitourinary: Negative.  Negative for dysuria, flank pain, frequency and urgency.   Musculoskeletal: Positive for arthralgias and gait problem. Negative for back pain, joint swelling, myalgias, neck pain and neck stiffness.        Chronic imbalance   Skin: Negative.  Negative for rash.   Neurological: Positive for weakness. Negative for dizziness and numbness.         Objective:   BP (!) 168/84 (BP Location: Right arm, Patient Position: Sitting, BP Method: Small (Automatic))   Pulse 67   Ht 6' 2" (1.88 m)   Wt 80.5 kg (177 lb 7.5 oz)   BMI 22.79 kg/m²      Physical Exam   Constitutional: He is oriented to person, place, and time and well-developed, well-nourished, and in no distress. No distress.   HENT:   Head: Normocephalic and atraumatic.   Right Ear: External ear normal.   Left Ear: External ear normal.   Mouth/Throat: No oropharyngeal exudate.   Eyes: Conjunctivae and EOM are normal. Pupils are equal, round, and reactive to light. No scleral icterus.   Neck: Neck supple. No thyromegaly present.   Cardiovascular: Normal rate, regular rhythm and normal heart sounds.    No murmur heard.  Pulmonary/Chest: Effort normal and breath sounds normal. No respiratory distress.   Abdominal: Soft. Bowel sounds are normal.       Right Side Rheumatological Exam     Examination finds the shoulder, elbow, wrist, knee, " 1st PIP, 1st MCP, 2nd PIP, 2nd MCP, 3rd PIP, 3rd MCP, 4th PIP, 4th MCP, 5th PIP and 5th MCP normal.    Muscle Strength (0-5 scale):  Deltoid:  5  Biceps: 5/5   Triceps:  5  : 4/5   Iliopsoas: 4.8  Quadriceps:  4.8     Left Side Rheumatological Exam     Examination finds the shoulder, elbow, wrist, knee, 1st PIP, 1st MCP, 2nd PIP, 2nd MCP, 3rd PIP, 3rd MCP, 4th PIP, 4th MCP, 5th PIP and 5th MCP normal.    Muscle Strength (0-5 scale):  Deltoid:  5  Biceps: 5/5   Triceps:  5  :  4/5   Iliopsoas: 4.8  Quadriceps:  4.8       Lymphadenopathy:     He has no cervical adenopathy.   Neurological: He is alert and oriented to person, place, and time. No cranial nerve deficit. He exhibits normal muscle tone. Gait normal. Coordination normal.   Skin: Skin is warm and dry.     Musculoskeletal: He exhibits deformity. He exhibits no edema or tenderness.   No triggering today  He does have early Dupuytren's contracture bilateral third fingers  No synovitis on exam   Mild imbalance- walk w/cane            Recent Results (from the past 840 hour(s))   CBC auto differential    Collection Time: 02/03/20  3:16 PM   Result Value Ref Range    WBC 7.27 3.90 - 12.70 K/uL    RBC 4.42 (L) 4.60 - 6.20 M/uL    Hemoglobin 14.8 14.0 - 18.0 g/dL    Hematocrit 43.0 40.0 - 54.0 %    Mean Corpuscular Volume 97 82 - 98 fL    Mean Corpuscular Hemoglobin 33.5 (H) 27.0 - 31.0 pg    Mean Corpuscular Hemoglobin Conc 34.4 32.0 - 36.0 g/dL    RDW 12.1 11.5 - 14.5 %    Platelets 283 150 - 350 K/uL    MPV 10.4 9.2 - 12.9 fL    Immature Granulocytes 0.1 0.0 - 0.5 %    Gran # (ANC) 4.7 1.8 - 7.7 K/uL    Immature Grans (Abs) 0.01 0.00 - 0.04 K/uL    Lymph # 1.8 1.0 - 4.8 K/uL    Mono # 0.6 0.3 - 1.0 K/uL    Eos # 0.1 0.0 - 0.5 K/uL    Baso # 0.04 0.00 - 0.20 K/uL    nRBC 0 0 /100 WBC    Gran% 65.0 38.0 - 73.0 %    Lymph% 25.2 18.0 - 48.0 %    Mono% 8.0 4.0 - 15.0 %    Eosinophil% 1.2 0.0 - 8.0 %    Basophil% 0.6 0.0 - 1.9 %    Differential Method Automated                  Assessment:       1. PMR (polymyalgia rheumatica)    2. Chronic kidney disease, stage 3    3. Spinal stenosis of lumbar region with neurogenic claudication    4. DDD (degenerative disc disease), lumbar    5. Primary Parkinsonism    6. Lumbar radiculopathy    7. Primary osteoarthritis involving multiple joints    8. Dupuytren's contracture of both hands    9. Gait abnormality    10. Primary osteoarthritis of left knee          1. PMR- recent recurrence now back on pred 2.5 mg bid doing well     In the past weaned off X 3 separate time but had recurrence when off prednisone      2. Lumbar stenosis with Lower back pain and radic pain   Post L3-S1 facet injection- mild help    3. CKD and DM  not able to take nsaids- renal function looks good eGFR 53    4. OA generalized    5. Dupuytren's contracture- early and mild will middle flexor tendon    6. Long term steroid use - normal dexa 2014- repeat dexa 6/6/18- normal with stable bmd compared back to 2014    7.  parkinson gait/imbalance on sinement- seeing neurology Dr Harden     Plan:         Have him but down on pred to 2.5 mg/d w/option to increase up to bid prn for joint issues    Repeat dexa 3-4 yrs    Topical voltaren gel qid prn     Continue to Try tummeric 1500 mg  Continue glucosamine and fish oil  Tylenol for pain , avoid nonsteroidals    At home exercise for dupuytren contracture, paraffin and range of motion exercises      F/U with neurology and interventional pain provider    rtc 16 weeks with ambar and reg 4 labs

## 2020-02-05 ENCOUNTER — PATIENT MESSAGE (OUTPATIENT)
Dept: NEUROLOGY | Facility: CLINIC | Age: 80
End: 2020-02-05

## 2020-02-06 ENCOUNTER — OFFICE VISIT (OUTPATIENT)
Dept: FAMILY MEDICINE | Facility: CLINIC | Age: 80
End: 2020-02-06
Payer: MEDICARE

## 2020-02-06 ENCOUNTER — LAB VISIT (OUTPATIENT)
Dept: LAB | Facility: HOSPITAL | Age: 80
End: 2020-02-06
Attending: FAMILY MEDICINE
Payer: MEDICARE

## 2020-02-06 VITALS
HEART RATE: 68 BPM | DIASTOLIC BLOOD PRESSURE: 65 MMHG | WEIGHT: 175.81 LBS | TEMPERATURE: 98 F | BODY MASS INDEX: 22.56 KG/M2 | SYSTOLIC BLOOD PRESSURE: 132 MMHG | HEIGHT: 74 IN

## 2020-02-06 DIAGNOSIS — M51.36 DDD (DEGENERATIVE DISC DISEASE), LUMBAR: ICD-10-CM

## 2020-02-06 DIAGNOSIS — R26.9 GAIT ABNORMALITY: ICD-10-CM

## 2020-02-06 DIAGNOSIS — Z79.899 OTHER LONG TERM (CURRENT) DRUG THERAPY: ICD-10-CM

## 2020-02-06 DIAGNOSIS — Z79.84 LONG TERM (CURRENT) USE OF ORAL HYPOGLYCEMIC DRUGS: ICD-10-CM

## 2020-02-06 DIAGNOSIS — E11.22 TYPE 2 DIABETES MELLITUS WITH STAGE 3 CHRONIC KIDNEY DISEASE, WITHOUT LONG-TERM CURRENT USE OF INSULIN: ICD-10-CM

## 2020-02-06 DIAGNOSIS — M47.816 LUMBAR SPONDYLOSIS: ICD-10-CM

## 2020-02-06 DIAGNOSIS — Z00.00 ROUTINE HISTORY AND PHYSICAL EXAMINATION OF ADULT: Primary | ICD-10-CM

## 2020-02-06 DIAGNOSIS — E11.40 TYPE 2 DIABETES MELLITUS WITH DIABETIC NEUROPATHY, WITHOUT LONG-TERM CURRENT USE OF INSULIN: ICD-10-CM

## 2020-02-06 DIAGNOSIS — G20.C PRIMARY PARKINSONISM: ICD-10-CM

## 2020-02-06 DIAGNOSIS — M35.3 PMR (POLYMYALGIA RHEUMATICA): ICD-10-CM

## 2020-02-06 DIAGNOSIS — Z85.46 HISTORY OF PROSTATE CANCER: ICD-10-CM

## 2020-02-06 DIAGNOSIS — N18.30 TYPE 2 DIABETES MELLITUS WITH STAGE 3 CHRONIC KIDNEY DISEASE, WITHOUT LONG-TERM CURRENT USE OF INSULIN: ICD-10-CM

## 2020-02-06 DIAGNOSIS — M48.062 SPINAL STENOSIS OF LUMBAR REGION WITH NEUROGENIC CLAUDICATION: ICD-10-CM

## 2020-02-06 LAB
BILIRUB UR QL STRIP: NEGATIVE
CLARITY UR: CLEAR
COLOR UR: YELLOW
GLUCOSE UR QL STRIP: NEGATIVE
HGB UR QL STRIP: NEGATIVE
KETONES UR QL STRIP: NEGATIVE
LEUKOCYTE ESTERASE UR QL STRIP: NEGATIVE
NITRITE UR QL STRIP: NEGATIVE
PH UR STRIP: 6 [PH] (ref 5–8)
PROT UR QL STRIP: NEGATIVE
SP GR UR STRIP: 1.03 (ref 1–1.03)
URN SPEC COLLECT METH UR: NORMAL

## 2020-02-06 PROCEDURE — 81002 URINALYSIS NONAUTO W/O SCOPE: CPT | Mod: HCNC,PO

## 2020-02-06 PROCEDURE — 99397 PR PREVENTIVE VISIT,EST,65 & OVER: ICD-10-PCS | Mod: HCNC,S$GLB,, | Performed by: FAMILY MEDICINE

## 2020-02-06 PROCEDURE — 3078F PR MOST RECENT DIASTOLIC BLOOD PRESSURE < 80 MM HG: ICD-10-PCS | Mod: HCNC,CPTII,S$GLB, | Performed by: FAMILY MEDICINE

## 2020-02-06 PROCEDURE — 99397 PER PM REEVAL EST PAT 65+ YR: CPT | Mod: HCNC,S$GLB,, | Performed by: FAMILY MEDICINE

## 2020-02-06 PROCEDURE — 83036 HEMOGLOBIN GLYCOSYLATED A1C: CPT | Mod: HCNC

## 2020-02-06 PROCEDURE — 3078F DIAST BP <80 MM HG: CPT | Mod: HCNC,CPTII,S$GLB, | Performed by: FAMILY MEDICINE

## 2020-02-06 PROCEDURE — 99999 PR PBB SHADOW E&M-EST. PATIENT-LVL V: CPT | Mod: PBBFAC,HCNC,, | Performed by: FAMILY MEDICINE

## 2020-02-06 PROCEDURE — 3051F HG A1C>EQUAL 7.0%<8.0%: CPT | Mod: HCNC,CPTII,S$GLB, | Performed by: FAMILY MEDICINE

## 2020-02-06 PROCEDURE — 3075F PR MOST RECENT SYSTOLIC BLOOD PRESS GE 130-139MM HG: ICD-10-PCS | Mod: HCNC,CPTII,S$GLB, | Performed by: FAMILY MEDICINE

## 2020-02-06 PROCEDURE — 84153 ASSAY OF PSA TOTAL: CPT | Mod: HCNC

## 2020-02-06 PROCEDURE — 99499 UNLISTED E&M SERVICE: CPT | Mod: HCNC,S$GLB,, | Performed by: FAMILY MEDICINE

## 2020-02-06 PROCEDURE — 3075F SYST BP GE 130 - 139MM HG: CPT | Mod: HCNC,CPTII,S$GLB, | Performed by: FAMILY MEDICINE

## 2020-02-06 PROCEDURE — 99499 RISK ADDL DX/OHS AUDIT: ICD-10-PCS | Mod: HCNC,S$GLB,, | Performed by: FAMILY MEDICINE

## 2020-02-06 PROCEDURE — 3051F PR MOST RECENT HEMOGLOBIN A1C LEVEL 7.0 - < 8.0%: ICD-10-PCS | Mod: HCNC,CPTII,S$GLB, | Performed by: FAMILY MEDICINE

## 2020-02-06 PROCEDURE — 82746 ASSAY OF FOLIC ACID SERUM: CPT | Mod: HCNC

## 2020-02-06 PROCEDURE — 84443 ASSAY THYROID STIM HORMONE: CPT | Mod: HCNC

## 2020-02-06 PROCEDURE — 82607 VITAMIN B-12: CPT | Mod: HCNC

## 2020-02-06 PROCEDURE — 36415 COLL VENOUS BLD VENIPUNCTURE: CPT | Mod: HCNC,PO

## 2020-02-06 PROCEDURE — 99999 PR PBB SHADOW E&M-EST. PATIENT-LVL V: ICD-10-PCS | Mod: PBBFAC,HCNC,, | Performed by: FAMILY MEDICINE

## 2020-02-06 NOTE — PROGRESS NOTES
Physical exam, multiple issues.  Primary problem is worsening Parkinson's disease. He has had a couple of episodes where he had difficulty getting out of bed.  Spouse with difficulty getting him bathe.  The increased his Sinemet up to a whole tablet 3 times a day on her own.  This does not seem to have helped much.  He does have follow-up appointment scheduled with neurologist.  He is ambulatory, but feels somewhat unsteady and states sometimes goes off to the side.  He is still going out once or twice a day to feed the burns in his yd.  This requires walking up and down 14 ft of steps.  He has not had any falls.  He does complain of some continue back pain. Previous injection with pain managed without significant improvement, but never followed up afterwards.  His blood pressure is controlled.  He stopped taking all of his blood pressure medications on his own because states blood pressure was getting too low.  He has not had any syncope or presyncope.  He does have a prior history of treated prostate cancer with last PSA 0.45.  We had recommended follow-up with urologist after this, but they never did this, does not recall discussing it.  He does have diabetes.  Did not bring glucose readings.  States compliance with diabetic medications.  Denies hypoglycemia.  Polymyalgia rheumatica stable on current low-dose prednisone.  Recent inflammatory markers were normal.  Seeing Rheumatology.      Jagdeep was seen today for annual exam.    Diagnoses and all orders for this visit:    Routine history and physical examination of adult    Primary Parkinsonism  -     TSH; Future  -     WALKER FOR HOME USE    Spinal stenosis of lumbar region with neurogenic claudication  -     Ambulatory referral/consult to Pain Clinic; Future  -     WALKER FOR HOME USE    DDD (degenerative disc disease), lumbar  -     Ambulatory referral/consult to Pain Clinic; Future  -     WALKER FOR HOME USE    Lumbar spondylosis  -     Ambulatory  referral/consult to Pain Clinic; Future    Type 2 diabetes mellitus with diabetic neuropathy, without long-term current use of insulin  -     Vitamin B12; Future  -     Folate; Future    Type 2 diabetes mellitus with stage 3 chronic kidney disease, without long-term current use of insulin  -     Urinalysis  -     Hemoglobin A1c; Future    History of prostate cancer  -     Prostate Specific Antigen, Diagnostic; Future    Gait abnormality  -     WALKER FOR HOME USE    Long term (current) use of oral hypoglycemic drugs   -     TSH; Future  -     Vitamin B12; Future  -     Folate; Future    Other long term (current) drug therapy   -     Vitamin B12; Future  -     Folate; Future    PMR (polymyalgia rheumatica)     Reviewed most recent laboratory.  Laboratory above today including hemoglobin A1c.  Keep appointment scheduled with neurologist.  He can stay off the blood pressure medication.  He can continue the Sinemet at current dosing until follow-up with the neurologist.  I did advise that they contact office prior to adjusting medications on their own.  Handicap tag given      Anticipatory guidance: Don't smoke.  Healthy diet and regular exercise recommended.      Diabetes Management Status    Statin: Taking  ACE/ARB: Taking    Screening or Prevention Patient's value Goal Complete/Controlled?   HgA1C Testing and Control   Lab Results   Component Value Date    HGBA1C 7.7 (H) 11/22/2019      Annually/Less than 8% Yes   Lipid profile : 11/22/2019 Annually Yes   LDL control Lab Results   Component Value Date    LDLCALC 97.0 11/22/2019    Annually/Less than 100 mg/dl  Yes   Nephropathy screening Lab Results   Component Value Date    LABMICR 35.0 11/22/2019     Lab Results   Component Value Date    PROTEINUA Negative 02/06/2020    Annually Yes   Blood pressure BP Readings from Last 1 Encounters:   02/06/20 (!) 149/71    Less than 140/90 No   Dilated retinal exam : 03/08/2019 Annually Yes   Foot exam   : 02/06/2020 Annually  Yes       Past Medical History:  Past Medical History:   Diagnosis Date    Adenomatous polyp of colon     Arthritis     Back pain     Carotid artery plaque     Less than 50%    Chronic kidney disease, stage 3     Diabetes mellitus, type 2     Diabetes with neurologic complications     Erectile dysfunction following radical prostatectomy     Fractures     Hyperlipidemia LDL goal < 100     Hypertension goal BP (blood pressure) < 130/80     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 ENDO;  Service: Endoscopy;  Laterality: N/A;    COLONOSCOPY N/A 10/11/2016    Procedure: COLONOSCOPY;  Surgeon: Erik Brandt MD;  Location: [facility];  Service: Endoscopy;  Laterality: N/A;    COLONOSCOPY N/A 10/24/2017    Procedure: COLONOSCOPY;  Surgeon: Erik Brandt MD;  Location: [facility];  Service: Endoscopy;  Laterality: N/A;    COLONOSCOPY N/A 8/23/2018    Procedure: COLONOSCOPY;  Surgeon: Nghia Cooper MD;  Location: [facility] ENDO (4TH FLR);  Service: Endoscopy;  Laterality: N/A;    FRACTURE SURGERY      HERNIA REPAIR      INJECTION OF FACET JOINT Right 7/23/2019    Procedure: INJECTION, FACET JOINT--Right L3,4,5,S1;  Surgeon: Jax Nelson Jr., MD;  Location: [facility] PAIN MGT;  Service: Pain Management;  Laterality: Right;  Pt is diabectic    LYMPH NODE DISSECTION      Pelvic    PROSTATECTOMY      SHOULDER ARTHROSCOPY      Right     Review of patient's allergies indicates:  No Known Allergies  Current Outpatient Medications on File Prior to Visit   Medication Sig Dispense Refill    aspirin (ECOTRIN) 81 MG EC tablet  Take 1 tablet (81 mg total) by mouth once daily.  0    blood sugar diagnostic (GLUCOSE BLOOD) Strp Test glucose 1 x daily 35 strip prn    carbidopa-levodopa  mg (SINEMET)  mg per tablet TAKE 1/2 TABLET BY MOUTH THREE TIMES DAILY (Patient taking differently: Take 1 tablet by mouth 3 (three) times daily. ) 135 tablet 3    diclofenac sodium (VOLTAREN) 1 % Gel Apply 2 g topically 4 (four) times daily as needed. 100 g 5    donepezil (ARICEPT) 5 MG tablet Take 1 tablet (5 mg total) by mouth every evening. 90 tablet 3    fish oil-omega-3 fatty acids 300-1,000 mg capsule Take 2 g by mouth once daily.      glimepiride (AMARYL) 2 MG tablet TAKE 1 TABLET(2 MG) BY MOUTH DAILY WITH BREAKFAST 90 tablet 0    GLUC.METER,DIS.P-LOADED STRIPS (GLUCOSE METER, DISP & STRIPS) Kit Check glucose once per day 1 kit prn    GLUCOSAMINE HCL (GLUCOSAMINE, BULK, MISC) by Misc.(Non-Drug; Combo Route) route.      L. RHAMNOSUS GG/INULIN (CULTURELLE PROBIOTICS ORAL) Take by mouth.      lancets Misc As directed 100 each prn    metFORMIN (GLUCOPHAGE) 1000 MG tablet Take 1 tablet (1,000 mg total) by mouth once daily. 90 tablet 3    pravastatin (PRAVACHOL) 20 MG tablet Take 1 tablet (20 mg total) by mouth once daily. 90 tablet 3    predniSONE (DELTASONE) 2.5 MG tablet Take 1 tablet (2.5 mg total) by mouth 2 (two) times daily. 180 tablet 3    turmeric root extract 500 mg Cap Take by mouth.      [DISCONTINUED] amLODIPine (NORVASC) 10 MG tablet TAKE 1 TABLET(10 MG) BY MOUTH EVERY DAY (Patient not taking: Reported on 2/3/2020) 90 tablet 0    [DISCONTINUED] benazepril (LOTENSIN) 20 MG tablet Take 1 tablet (20 mg total) by mouth once daily. (Patient not taking: Reported on 2/3/2020) 90 tablet 3    [DISCONTINUED] hydroCHLOROthiazide (HYDRODIURIL) 12.5 MG Tab Take 1 tablet (12.5 mg total) by mouth once daily. (Patient not taking: Reported on 2/3/2020) 90 tablet 3    [DISCONTINUED] MEN'S MULTI-VITAMIN ORAL Take by mouth.       No  current facility-administered medications on file prior to visit.      Social History     Socioeconomic History    Marital status:      Spouse name: Joaquin    Number of children: 0    Years of education: Not on file    Highest education level: Not on file   Occupational History    Occupation: Retired   Social Needs    Financial resource strain: Not on file    Food insecurity:     Worry: Not on file     Inability: Not on file    Transportation needs:     Medical: Not on file     Non-medical: Not on file   Tobacco Use    Smoking status: Never Smoker    Smokeless tobacco: Never Used   Substance and Sexual Activity    Alcohol use: Yes     Alcohol/week: 35.0 standard drinks     Types: 14 Cans of beer, 21 Shots of liquor per week     Comment: 3 drinks a day    Drug use: No    Sexual activity: Not on file   Lifestyle    Physical activity:     Days per week: Not on file     Minutes per session: Not on file    Stress: Not on file   Relationships    Social connections:     Talks on phone: Not on file     Gets together: Not on file     Attends Zoroastrianism service: Not on file     Active member of club or organization: Not on file     Attends meetings of clubs or organizations: Not on file     Relationship status: Not on file   Other Topics Concern    Not on file   Social History Narrative    Not on file     Family History   Problem Relation Age of Onset    Leukemia Father 68    Anesthesia problems Neg Hx              ROS:  GENERAL: No fever, chills,  or significant weight changes.  HEENT: No headache or hearing complaints.  No dysphagia  Eyes: No vision complaints  CHEST: Denies ANDRE, cyanosis, wheezing, cough and sputum production.  CARDIOVASCULAR: Denies chest pain, PND, orthopnea .  ABDOMEN: Appetite fine. Denies diarrhea, abdominal pain, hematemesis or blood in stool.  URINARY: No flank pain, dysuria or hematuria.  MUSCULOSKELETAL: positive low back pain.  NEUROLOGIC:  Positive gait  "disturbance  PSYCHIATRIC: Denies complaints      OBJECTIVE:   Vitals:    02/06/20 1013 02/06/20 1130 02/06/20 1131   BP: 132/65 133/73 (!) 149/71   Pulse: 68 62 68   Temp: 98.2 °F (36.8 °C)     TempSrc: Oral     Weight: 79.7 kg (175 lb 12.8 oz)     Height: 6' 2" (1.88 m)            Sitting    standing  Wt Readings from Last 3 Encounters:   02/06/20 79.7 kg (175 lb 12.8 oz)   02/03/20 80.5 kg (177 lb 7.5 oz)   09/30/19 79 kg (174 lb 2.6 oz)       APPEARANCE: thin, well developed, in no acute distress.   HEAD: Normocephalic. Atraumatic. No sinus tenderness.   EYES:   Right eye: Pupil reactive. Conjunctiva clear.   Left eye: Pupil reactive. Conjunctiva clear.   Both fundi: Grossly normal to nondilated exam. EOMI.   EARS: TM's intact. Light reflex normal. No retraction or perforation.   NOSE: clear.   MOUTH & THROAT: No pharyngeal erythema or exudate. No lesions.   NECK: No bruits. No JVD. No cervical lymphadenopathy. No thyromegaly.   CHEST: Breath sounds clear bilaterally. Normal respiratory effort   CARDIOVASCULAR: Normal rate. Regular rhythm. No murmurs. No rub. No gallops.   ABDOMEN: Bowel sounds normal. Soft. No tenderness. No organomegaly.   PERIPHERAL VASCULAR: No cyanosis. No clubbing. No edema.   NEUROLOGIC:  No significant tremor noted at present.  He is able to get up from a chair pushing up with his arms.  He is able to walk in the [place] though somewhat slow.  Feet:Sensation in the feet diminished to monofilament testing.   He has hammertoes and calluses noted. Otherwise No significant foot lesions.Pulses palpable.  MENTAL STATUS: Alert. Oriented x 3.           "

## 2020-02-06 NOTE — Clinical Note
Please fax note including addendum as well as prescription for hospital bed after I sign it to [facility] [phone] ASAP Monday morning.

## 2020-02-07 LAB
COMPLEXED PSA SERPL-MCNC: 0.57 NG/ML (ref 0–4)
ESTIMATED AVG GLUCOSE: 160 MG/DL (ref 68–131)
FOLATE SERPL-MCNC: 12.6 NG/ML (ref 4–24)
HBA1C MFR BLD HPLC: 7.2 % (ref 4–5.6)
TSH SERPL DL<=0.005 MIU/L-ACNC: 1.21 UIU/ML (ref 0.4–4)
VIT B12 SERPL-MCNC: 419 PG/ML (ref 210–950)

## 2020-02-11 ENCOUNTER — TELEPHONE (OUTPATIENT)
Dept: FAMILY MEDICINE | Facility: CLINIC | Age: 80
End: 2020-02-11

## 2020-02-11 DIAGNOSIS — E11.59 HYPERTENSION ASSOCIATED WITH DIABETES: ICD-10-CM

## 2020-02-11 DIAGNOSIS — M35.3 PMR (POLYMYALGIA RHEUMATICA): ICD-10-CM

## 2020-02-11 DIAGNOSIS — M48.062 SPINAL STENOSIS OF LUMBAR REGION WITH NEUROGENIC CLAUDICATION: ICD-10-CM

## 2020-02-11 DIAGNOSIS — G20.C PRIMARY PARKINSONISM: Primary | ICD-10-CM

## 2020-02-11 DIAGNOSIS — R26.9 GAIT ABNORMALITY: ICD-10-CM

## 2020-02-11 DIAGNOSIS — E11.40 TYPE 2 DIABETES MELLITUS WITH DIABETIC NEUROPATHY, WITHOUT LONG-TERM CURRENT USE OF INSULIN: ICD-10-CM

## 2020-02-11 DIAGNOSIS — I15.2 HYPERTENSION ASSOCIATED WITH DIABETES: ICD-10-CM

## 2020-02-11 NOTE — TELEPHONE ENCOUNTER
Called [facility] to verify they received order, nurse stated epic was down and she wanted me to fax order.

## 2020-02-11 NOTE — TELEPHONE ENCOUNTER
Patient with increasing issues with ambulation.  Recent fall when he was out in the yd trying to feed birds.  Has 14 ft steps going up and down into the house.  Pending follow-up with Neurology regarding his Parkinson's.  Prescription given for walker recently.  Would like home health physical therapy.  Orders placed for home health.

## 2020-02-12 ENCOUNTER — TELEPHONE (OUTPATIENT)
Dept: FAMILY MEDICINE | Facility: CLINIC | Age: 80
End: 2020-02-12

## 2020-02-12 NOTE — TELEPHONE ENCOUNTER
Katherine ALBERTO Staff   Phone Number: [phone]             Good morning,     I spoke to patients wife this morning. She requested home health to begin on Friday, 2/14/2020. She states he does not need nursing and is only requesting physical therapy.     Thank you for the referral,   Katherine DOBSON LPN      [facility]

## 2020-02-13 ENCOUNTER — OFFICE VISIT (OUTPATIENT)
Dept: PAIN MEDICINE | Facility: CLINIC | Age: 80
End: 2020-02-13
Payer: MEDICARE

## 2020-02-13 ENCOUNTER — TELEPHONE (OUTPATIENT)
Dept: PAIN MEDICINE | Facility: CLINIC | Age: 80
End: 2020-02-13

## 2020-02-13 VITALS
HEART RATE: 71 BPM | SYSTOLIC BLOOD PRESSURE: 153 MMHG | BODY MASS INDEX: 22.21 KG/M2 | DIASTOLIC BLOOD PRESSURE: 85 MMHG | WEIGHT: 173.06 LBS | HEIGHT: 74 IN

## 2020-02-13 DIAGNOSIS — M48.062 SPINAL STENOSIS OF LUMBAR REGION WITH NEUROGENIC CLAUDICATION: ICD-10-CM

## 2020-02-13 DIAGNOSIS — M47.816 LUMBAR SPONDYLOSIS: ICD-10-CM

## 2020-02-13 DIAGNOSIS — M51.36 DDD (DEGENERATIVE DISC DISEASE), LUMBAR: ICD-10-CM

## 2020-02-13 PROCEDURE — 99214 PR OFFICE/OUTPT VISIT, EST, LEVL IV, 30-39 MIN: ICD-10-PCS | Mod: HCNC,S$GLB,, | Performed by: PAIN MEDICINE

## 2020-02-13 PROCEDURE — 1125F PR PAIN SEVERITY QUANTIFIED, PAIN PRESENT: ICD-10-PCS | Mod: HCNC,S$GLB,, | Performed by: PAIN MEDICINE

## 2020-02-13 PROCEDURE — 99999 PR PBB SHADOW E&M-EST. PATIENT-LVL V: ICD-10-PCS | Mod: PBBFAC,HCNC,, | Performed by: PAIN MEDICINE

## 2020-02-13 PROCEDURE — 3288F PR FALLS RISK ASSESSMENT DOCUMENTED: ICD-10-PCS | Mod: HCNC,CPTII,S$GLB, | Performed by: PAIN MEDICINE

## 2020-02-13 PROCEDURE — 3077F PR MOST RECENT SYSTOLIC BLOOD PRESSURE >= 140 MM HG: ICD-10-PCS | Mod: HCNC,CPTII,S$GLB, | Performed by: PAIN MEDICINE

## 2020-02-13 PROCEDURE — 3077F SYST BP >= 140 MM HG: CPT | Mod: HCNC,CPTII,S$GLB, | Performed by: PAIN MEDICINE

## 2020-02-13 PROCEDURE — 99499 UNLISTED E&M SERVICE: CPT | Mod: HCNC,S$GLB,, | Performed by: PAIN MEDICINE

## 2020-02-13 PROCEDURE — 99214 OFFICE O/P EST MOD 30 MIN: CPT | Mod: HCNC,S$GLB,, | Performed by: PAIN MEDICINE

## 2020-02-13 PROCEDURE — 3079F DIAST BP 80-89 MM HG: CPT | Mod: HCNC,CPTII,S$GLB, | Performed by: PAIN MEDICINE

## 2020-02-13 PROCEDURE — 1125F AMNT PAIN NOTED PAIN PRSNT: CPT | Mod: HCNC,S$GLB,, | Performed by: PAIN MEDICINE

## 2020-02-13 PROCEDURE — 1100F PR PT FALLS ASSESS DOC 2+ FALLS/FALL W/INJURY/YR: ICD-10-PCS | Mod: HCNC,CPTII,S$GLB, | Performed by: PAIN MEDICINE

## 2020-02-13 PROCEDURE — 3288F FALL RISK ASSESSMENT DOCD: CPT | Mod: HCNC,CPTII,S$GLB, | Performed by: PAIN MEDICINE

## 2020-02-13 PROCEDURE — 99499 RISK ADDL DX/OHS AUDIT: ICD-10-PCS | Mod: HCNC,S$GLB,, | Performed by: PAIN MEDICINE

## 2020-02-13 PROCEDURE — 99999 PR PBB SHADOW E&M-EST. PATIENT-LVL V: CPT | Mod: PBBFAC,HCNC,, | Performed by: PAIN MEDICINE

## 2020-02-13 PROCEDURE — 3079F PR MOST RECENT DIASTOLIC BLOOD PRESSURE 80-89 MM HG: ICD-10-PCS | Mod: HCNC,CPTII,S$GLB, | Performed by: PAIN MEDICINE

## 2020-02-13 PROCEDURE — 1159F MED LIST DOCD IN RCRD: CPT | Mod: HCNC,S$GLB,, | Performed by: PAIN MEDICINE

## 2020-02-13 PROCEDURE — 1159F PR MEDICATION LIST DOCUMENTED IN MEDICAL RECORD: ICD-10-PCS | Mod: HCNC,S$GLB,, | Performed by: PAIN MEDICINE

## 2020-02-13 PROCEDURE — 1100F PTFALLS ASSESS-DOCD GE2>/YR: CPT | Mod: HCNC,CPTII,S$GLB, | Performed by: PAIN MEDICINE

## 2020-02-13 NOTE — TELEPHONE ENCOUNTER
----- Message from Panda Contreras MD sent at 2/13/2020 12:11 PM CST -----  It is okay for him to hold this prior to the procedure.  Thanks  ----- Message -----  From: Franci Bustillos MA  Sent: 2/13/2020  12:00 PM CST  To: Panda Contreras MD    Good Morning. Dr. Baltazar request to perform transforaminal ALBERTO for patient. Pt is currently on Aspirin and will need to d/c 1 (one) week prior to having this injection along with all other blood thinners including.  Please advise or contact me at ext [phone] with questions.    Thanks  Franci

## 2020-02-13 NOTE — LETTER
February 13, 2020      Panda Contreras MD  [address]  [place] [address]           [facility]  [address] [place] [id number]  Phone: [phone]  Fax: [phone]          Patient: Jagdeep Greenberg   MR Number: [id number]   YOB: 1940   Date of Visit: 2/13/2020       Dear Dr. Panda Contreras:    Thank you for referring Jagdeep Greenberg to me for evaluation. Attached you will find relevant portions of my assessment and plan of care.    If you have questions, please do not hesitate to call me. I look forward to following Jagdeep Greenberg along with you.    Sincerely,    Pino Baltazar MD    Enclosure  CC:  No Recipients    If you would like to receive this communication electronically, please contact [email or web address] or [phone] to request more information on [employer] access.    For providers and/or their staff who would like to refer a patient to [facility], please contact us through our one-stop-shop provider referral line, [facility], at [phone].    If you feel you have received this communication in error or would no longer like to receive these types of communications, please e-mail [email or web address]

## 2020-02-13 NOTE — PATIENT INSTRUCTIONS
-continue Tylenol over-the-counter not to exceed 3000 mg daily  -will schedule for right L3-5 lumbar radiofrequency ablation  -start home health physical therapy tomorrow  -follow up in clinic 8 weeks after the procedure

## 2020-02-13 NOTE — TELEPHONE ENCOUNTER
Contacted pt. Injection scheduled. Pre injection instructions taught and mailed. Orders entered. Pt was able to verbalize all understanding. All questions answered.//dp

## 2020-02-13 NOTE — H&P (VIEW-ONLY)
Chief Pain Complaint:  Low back pain    This note was created using voice recognition, there may be errors that were missed during proofreading.    History of Present Illness:   This patient is a 79 y.o. male who presents today complaining of the above noted pain/s. The patient describes the pain as follows.  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  Has been having symptoms for approximately the last 4 years and currently rates his pain as a 9/10 which is located primarily on the right axial lumbar spine.  It is a constant aching sensation which is worse with bending over and does improve when he lays down.  He denies having symptoms that radiate into his bilateral lower extremities. He denies having numbness and weakness in his legs and is found Tylenol to be very helpful.  He has completed physical therapy and past however he will restart home health physical therapy tomorrow.  He denies having had surgery on his lumbar spine but he has undergone right lumbar facet joint injections which she reports provided greater than 90% symptomatic pain relief for several days before symptoms returned.    Previous Therapy:  Medications:  Voltaren gel, turmeric  Injections:  Lumbar facet joint injections with greater than 90% symptomatic pain relief for 2-3 days  Surgeries: None  Physical Therapy: Completed in the Past    Past Surgical History:   Procedure Laterality Date    APPENDECTOMY      COLONOSCOPY  In 3/2/2010    Repeat 5 years    COLONOSCOPY N/A 9/29/2015    Procedure: COLONOSCOPY;  Surgeon: Erik Brandt MD;  Location: [facility];  Service: Endoscopy;  Laterality: N/A;    COLONOSCOPY N/A 10/11/2016    Procedure: COLONOSCOPY;  Surgeon: Erik Brandt MD;  Location: [facility];  Service: Endoscopy;  Laterality: N/A;    COLONOSCOPY N/A 10/24/2017    Procedure: COLONOSCOPY;  Surgeon: Erik Brandt MD;  Location: [facility];  Service: Endoscopy;  Laterality: N/A;     COLONOSCOPY N/A 8/23/2018    Procedure: COLONOSCOPY;  Surgeon: Nghia Cooper MD;  Location: [facility] ENDO ([address] [place] [address]);  Service: Endoscopy;  Laterality: N/A;    FRACTURE SURGERY      HERNIA REPAIR      INJECTION OF FACET JOINT Right 7/23/2019    Procedure: INJECTION, FACET JOINT--Right L3,4,5,S1;  Surgeon: Jax Nelson Jr., MD;  Location: [facility];  Service: Pain Management;  Laterality: Right;  Pt is diabectic    LYMPH NODE DISSECTION      Pelvic    PROSTATECTOMY      SHOULDER ARTHROSCOPY      Right       Family History   Problem Relation Age of Onset    Leukemia Father 68    Anesthesia problems Neg Hx        Social History     Socioeconomic History    Marital status:      Spouse name: Joaquin    Number of children: 0    Years of education: Not on file    Highest education level: Not on file   Occupational History    Occupation: Retired   Social Needs    Financial resource strain: Not on file    Food insecurity:     Worry: Not on file     Inability: Not on file    Transportation needs:     Medical: Not on file     Non-medical: Not on file   Tobacco Use    Smoking status: Never Smoker    Smokeless tobacco: Never Used   Substance and Sexual Activity    Alcohol use: Yes     Alcohol/week: 35.0 standard drinks     Types: 14 Cans of beer, 21 Shots of liquor per week     Comment: 3 drinks a day    Drug use: No    Sexual activity: Not on file   Lifestyle    Physical activity:     Days per week: Not on file     Minutes per session: Not on file    Stress: Not on file   Relationships    Social connections:     Talks on phone: Not on file     Gets together: Not on file     Attends Christian service: Not on file     Active member of club or organization: Not on file     Attends meetings of clubs or organizations: Not on file     Relationship status: Not on file   Other Topics Concern    Not on file   Social History Narrative    Not on file        Imaging / Labs / Studies (reviewed on  2/13/2020):    Results for orders placed during the hospital encounter of 05/29/19   MRI Lumbar Spine Without Contrast    Narrative EXAMINATION:  MRI LUMBAR SPINE WITHOUT CONTRAST    CLINICAL HISTORY:  Spinal stenosis, lumbar region without neurogenic claudicationLow back pain, >6wks conservative tx, persistent-progressive sx, surgical candidate;    TECHNIQUE:  MR Lumbar spine without contrast. Sagittal T1, T2, STIR. Axial T1, T2. Coronal T1.    COMPARISON:  None    FINDINGS:  Vertebral body height is normal and alignment is maintained. Degenerative fatty Modic type 2 endplate change at L4-L5 and L5-S1. The conus medullaris terminates at the level of L1-L2. No abnormal signal within the conus. Intervertebral disc levels are as follows:    T12-L1 disc: No significant disc pathology.  Normal facet joints.  No spinal canal or neural foraminal stenosis.    L1-L2 disc : No significant disc pathology.  Normal facet joints.  No spinal canal or neural foraminal stenosis.    L2-L3 disc: Circumferentially bulging disc.  Normal facet joints.  No significant stenosis.  The thecal sac measures 12 mm AP.    L3-L4 disc: Partial disc desiccation.  Circumferential disc bulge with a superimposed small central protrusion.  Disc encroaches into the floors of the exit foramina, left greater than right.  Minimal buckling of ligamentum flavum.  The thecal sac measures 10 mm AP.  Mild left foraminal stenosis.    L4-L5 disc: Asymmetric, severe disc space height loss most pronounced toward the right with fatty Modic type 2 endplate change.  Disc bulges into the floors of the exit foramina bilaterally, right greater than left.  Severe degenerative facet hypertrophy.  Posteriorly and inferiorly projecting synovial cyst from the right facet joint measuring 7 mm.  Buckling of ligamentum flavum.  The thecal sac measures 11 mm AP.  Moderate right and mild left lateral recess stenosis.  Moderate/severe right foraminal stenosis.  Minimal left  foraminal stenosis.    L5-S1 disc: Severe disc space height loss with fatty Modic type 2 endplate change.  Circumferential disc bulge that encroaches into the exit foramina bilaterally.  This results in moderate bilateral foraminal stenosis.  No significant spinal stenosis.  The thecal sac measures 15 mm.      Impression 1. Degenerative changes most pronounced inferiorly.  There is mild spinal stenosis at L4-L5 with lateral recess stenosis that could affect the descending L5 spinal nerve roots.  2. Foraminal stenosis at L4-L5 and L5-S1 could affect the exiting L4 and/or L5 spinal nerves.     Results for orders placed during the hospital encounter of 05/07/19   X-Ray Lumbar Spine Ap And Lateral    Narrative EXAMINATION:  XR LUMBAR SPINE AP AND LATERAL    CLINICAL HISTORY:  Low back pain, >6wks conservative tx, persistent-progressive sx, surgical candidate;Low back pain, risk factors (osteoporosis or chronic steroid use or elderly);Low back pain    TECHNIQUE:  AP, lateral and spot images were performed of the lumbar spine.    COMPARISON:  04/20/2017    FINDINGS:  The vertebral bodies demonstrate a normal height.  There is a couple mm of anterolisthesis of L4 on L5.  There is moderate to severe disc space narrowing and spondylosis present at the L5-S1 level.  There is mild dextroscoliosis of the lumbar spine.  There is at least moderate bilateral facet arthropathy noted at the L4-5 and L5-S1 levels.  Vascular calcification seen involving the aorta.      Impression 1.  As above     Review of Systems:  Review of Systems   Constitutional: Negative for fever.   Eyes: Negative for blurred vision.   Respiratory: Negative for cough and wheezing.    Cardiovascular: Negative for chest pain and orthopnea.   Gastrointestinal: Negative for constipation, diarrhea, nausea and vomiting.   Genitourinary: Negative for dysuria.   Musculoskeletal: Positive for back pain.   Skin: Negative for itching and rash.   Neurological: Negative for  weakness.   Endo/Heme/Allergies: Does not bruise/bleed easily.       Physical Exam:  There were no vitals taken for this visit. (reviewed on 2020)\  General    Constitutional: He is oriented to person, place, and time. He appears well-developed and well-nourished.   HENT:   Head: Normocephalic and atraumatic.   Eyes: EOM are normal.   Neck: Neck supple.   Cardiovascular: Normal rate.    Pulmonary/Chest: Effort normal.   Abdominal: He exhibits no distension.   Neurological: He is alert and oriented to person, place, and time. No cranial nerve deficit.   Psychiatric: He has a normal mood and affect.     General Musculoskeletal Exam   Gait: antalgic (Uses single-point cane for ambulation)     Back (L-Spine & T-Spine) / Neck (C-Spine) Exam     Back (L-Spine & T-Spine) Range of Motion   Extension: normal   Flexion: normal   Lateral bend right: normal   Lateral bend left: normal   Rotation right: normal   Rotation left: normal     Spinal Sensation   Right Side Sensation  L-Spine Level: normal  Left Side Sensation  L-Spine Level: normal    Comments:  Decrease in pain with lumbar flexion; minimal increase in pain with extension; negative straight leg raise bilaterally      Muscle Strength   Right Lower Extremity   Hip Flexion: 5/5   Quadriceps:  5/5   Hamstrin/5   Left Lower Extremity   Hip Flexion: 5/5   Quadriceps:  5/5   Hamstrin/5     Reflexes     Left Side  Quadriceps:  2+  Achilles:  2+  Ankle Clonus:  absent    Right Side   Quadriceps:  2+  Achilles:  2+  Ankle Clonus:  absent      Assessment  Lumbar Spondylosis  Lumbar Degenerative Disc Disease    1. 79 y.o. year old patient with PMH of   Past Medical History:   Diagnosis Date    Adenomatous polyp of colon     Arthritis     Back pain     Carotid artery plaque     Less than 50%    Chronic kidney disease, stage 3     Diabetes mellitus, type 2     Diabetes with neurologic complications     Erectile dysfunction following radical prostatectomy      Fractures     Hyperlipidemia LDL goal < 100     Hypertension goal BP (blood pressure) < 130/80     Lacunar stroke 8/17/2017    MCI (mild cognitive impairment)     Parkinson disease     Parkinsonism 4/11/2017    Prostate cancer 2001    Renal manifestation of secondary diabetes mellitus     Stroke       presenting with pain located lumbar spine  2. Pain Generators / Etiology: Lumbar Spondylosis and Lumbar Degenerative Disc Disease  3. Failed Meds (E- Effective, NE- Not Effective):  Tylenol-effective  4. Physical Therapy - Completed in the Past  5. Psychological comorbidities - None  6. Anticoagulants / Antiplatelets: Aspirin 81mg     PLAN:  1. Medications:  Continue Tylenol over-the-counter not to exceed 3000 mg daily    2. PT - patient will start home health physical therapy tomorrow  3. Psychological - none  4. Labs - obtain  none  5. Imaging - obtain  none; reviewed lumbar x-ray and MRI patient today in clinic  6. Interventions - schedule right lumbar radiofrequency ablation targeting the L4/5 and L5/S1 facet joints  7. Referrals - none  8. Records - none  9. Follow up visit - follow up in clinic 8 weeks after the procedure  10. Patient Questions - answered all of the patient's questions regarding diagnosis, therapy, and treatment  11.  This condition does not require this patient to take time off of work    CLAUDIA Baltazar MD  Interventional Pain  [facility] [place]

## 2020-02-13 NOTE — PROGRESS NOTES
Chief Pain Complaint:  Low back pain    This note was created using voice recognition, there may be errors that were missed during proofreading.    History of Present Illness:   This patient is a 79 y.o. male who presents today complaining of the above noted pain/s. The patient describes the pain as follows.  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  Has been having symptoms for approximately the last 4 years and currently rates his pain as a 9/10 which is located primarily on the right axial lumbar spine.  It is a constant aching sensation which is worse with bending over and does improve when he lays down.  He denies having symptoms that radiate into his bilateral lower extremities. He denies having numbness and weakness in his legs and is found Tylenol to be very helpful.  He has completed physical therapy and past however he will restart home health physical therapy tomorrow.  He denies having had surgery on his lumbar spine but he has undergone right lumbar facet joint injections which she reports provided greater than 90% symptomatic pain relief for several days before symptoms returned.    Previous Therapy:  Medications:  Voltaren gel, turmeric  Injections:  Lumbar facet joint injections with greater than 90% symptomatic pain relief for 2-3 days  Surgeries: None  Physical Therapy: Completed in the Past    Past Surgical History:   Procedure Laterality Date    APPENDECTOMY      COLONOSCOPY  In 3/2/2010    Repeat 5 years    COLONOSCOPY N/A 9/29/2015    Procedure: COLONOSCOPY;  Surgeon: Erik Brandt MD;  Location: [facility];  Service: Endoscopy;  Laterality: N/A;    COLONOSCOPY N/A 10/11/2016    Procedure: COLONOSCOPY;  Surgeon: Erik Brandt MD;  Location: [facility];  Service: Endoscopy;  Laterality: N/A;    COLONOSCOPY N/A 10/24/2017    Procedure: COLONOSCOPY;  Surgeon: Erik Brandt MD;  Location: [facility];  Service: Endoscopy;  Laterality: N/A;     COLONOSCOPY N/A 8/23/2018    Procedure: COLONOSCOPY;  Surgeon: Nghia Cooper MD;  Location: [facility] ENDO ([address] [place] [address]);  Service: Endoscopy;  Laterality: N/A;    FRACTURE SURGERY      HERNIA REPAIR      INJECTION OF FACET JOINT Right 7/23/2019    Procedure: INJECTION, FACET JOINT--Right L3,4,5,S1;  Surgeon: Jax Nelson Jr., MD;  Location: [facility];  Service: Pain Management;  Laterality: Right;  Pt is diabectic    LYMPH NODE DISSECTION      Pelvic    PROSTATECTOMY      SHOULDER ARTHROSCOPY      Right       Family History   Problem Relation Age of Onset    Leukemia Father 68    Anesthesia problems Neg Hx        Social History     Socioeconomic History    Marital status:      Spouse name: Joaquin    Number of children: 0    Years of education: Not on file    Highest education level: Not on file   Occupational History    Occupation: Retired   Social Needs    Financial resource strain: Not on file    Food insecurity:     Worry: Not on file     Inability: Not on file    Transportation needs:     Medical: Not on file     Non-medical: Not on file   Tobacco Use    Smoking status: Never Smoker    Smokeless tobacco: Never Used   Substance and Sexual Activity    Alcohol use: Yes     Alcohol/week: 35.0 standard drinks     Types: 14 Cans of beer, 21 Shots of liquor per week     Comment: 3 drinks a day    Drug use: No    Sexual activity: Not on file   Lifestyle    Physical activity:     Days per week: Not on file     Minutes per session: Not on file    Stress: Not on file   Relationships    Social connections:     Talks on phone: Not on file     Gets together: Not on file     Attends [employer]ism service: Not on file     Active member of club or organization: Not on file     Attends meetings of clubs or organizations: Not on file     Relationship status: Not on file   Other Topics Concern    Not on file   Social History Narrative    Not on file        Imaging / Labs / Studies (reviewed on  2/13/2020):    Results for orders placed during the hospital encounter of 05/29/19   MRI Lumbar Spine Without Contrast    Narrative EXAMINATION:  MRI LUMBAR SPINE WITHOUT CONTRAST    CLINICAL HISTORY:  Spinal stenosis, lumbar region without neurogenic claudicationLow back pain, >6wks conservative tx, persistent-progressive sx, surgical candidate;    TECHNIQUE:  MR Lumbar spine without contrast. Sagittal T1, T2, STIR. Axial T1, T2. Coronal T1.    COMPARISON:  None    FINDINGS:  Vertebral body height is normal and alignment is maintained. Degenerative fatty Modic type 2 endplate change at L4-L5 and L5-S1. The conus medullaris terminates at the level of L1-L2. No abnormal signal within the conus. Intervertebral disc levels are as follows:    T12-L1 disc: No significant disc pathology.  Normal facet joints.  No spinal canal or neural foraminal stenosis.    L1-L2 disc : No significant disc pathology.  Normal facet joints.  No spinal canal or neural foraminal stenosis.    L2-L3 disc: Circumferentially bulging disc.  Normal facet joints.  No significant stenosis.  The thecal sac measures 12 mm AP.    L3-L4 disc: Partial disc desiccation.  Circumferential disc bulge with a superimposed small central protrusion.  Disc encroaches into the floors of the exit foramina, left greater than right.  Minimal buckling of ligamentum flavum.  The thecal sac measures 10 mm AP.  Mild left foraminal stenosis.    L4-L5 disc: Asymmetric, severe disc space height loss most pronounced toward the right with fatty Modic type 2 endplate change.  Disc bulges into the floors of the exit foramina bilaterally, right greater than left.  Severe degenerative facet hypertrophy.  Posteriorly and inferiorly projecting synovial cyst from the right facet joint measuring 7 mm.  Buckling of ligamentum flavum.  The thecal sac measures 11 mm AP.  Moderate right and mild left lateral recess stenosis.  Moderate/severe right foraminal stenosis.  Minimal left  foraminal stenosis.    L5-S1 disc: Severe disc space height loss with fatty Modic type 2 endplate change.  Circumferential disc bulge that encroaches into the exit foramina bilaterally.  This results in moderate bilateral foraminal stenosis.  No significant spinal stenosis.  The thecal sac measures 15 mm.      Impression 1. Degenerative changes most pronounced inferiorly.  There is mild spinal stenosis at L4-L5 with lateral recess stenosis that could affect the descending L5 spinal nerve roots.  2. Foraminal stenosis at L4-L5 and L5-S1 could affect the exiting L4 and/or L5 spinal nerves.     Results for orders placed during the hospital encounter of 05/07/19   X-Ray Lumbar Spine Ap And Lateral    Narrative EXAMINATION:  XR LUMBAR SPINE AP AND LATERAL    CLINICAL HISTORY:  Low back pain, >6wks conservative tx, persistent-progressive sx, surgical candidate;Low back pain, risk factors (osteoporosis or chronic steroid use or elderly);Low back pain    TECHNIQUE:  AP, lateral and spot images were performed of the lumbar spine.    COMPARISON:  04/20/2017    FINDINGS:  The vertebral bodies demonstrate a normal height.  There is a couple mm of anterolisthesis of L4 on L5.  There is moderate to severe disc space narrowing and spondylosis present at the L5-S1 level.  There is mild dextroscoliosis of the lumbar spine.  There is at least moderate bilateral facet arthropathy noted at the L4-5 and L5-S1 levels.  Vascular calcification seen involving the aorta.      Impression 1.  As above     Review of Systems:  Review of Systems   Constitutional: Negative for fever.   Eyes: Negative for blurred vision.   Respiratory: Negative for cough and wheezing.    Cardiovascular: Negative for chest pain and orthopnea.   Gastrointestinal: Negative for constipation, diarrhea, nausea and vomiting.   Genitourinary: Negative for dysuria.   Musculoskeletal: Positive for back pain.   Skin: Negative for itching and rash.   Neurological: Negative for  weakness.   Endo/Heme/Allergies: Does not bruise/bleed easily.       Physical Exam:  There were no vitals taken for this visit. (reviewed on 2020)\  General    Constitutional: He is oriented to person, place, and time. He appears well-developed and well-nourished.   HENT:   Head: Normocephalic and atraumatic.   Eyes: EOM are normal.   Neck: Neck supple.   Cardiovascular: Normal rate.    Pulmonary/Chest: Effort normal.   Abdominal: He exhibits no distension.   Neurological: He is alert and oriented to person, place, and time. No cranial nerve deficit.   Psychiatric: He has a normal mood and affect.     General Musculoskeletal Exam   Gait: antalgic (Uses single-point cane for ambulation)     Back (L-Spine & T-Spine) / Neck (C-Spine) Exam     Back (L-Spine & T-Spine) Range of Motion   Extension: normal   Flexion: normal   Lateral bend right: normal   Lateral bend left: normal   Rotation right: normal   Rotation left: normal     Spinal Sensation   Right Side Sensation  L-Spine Level: normal  Left Side Sensation  L-Spine Level: normal    Comments:  Decrease in pain with lumbar flexion; minimal increase in pain with extension; negative straight leg raise bilaterally      Muscle Strength   Right Lower Extremity   Hip Flexion: 5/5   Quadriceps:  5/5   Hamstrin/5   Left Lower Extremity   Hip Flexion: 5/5   Quadriceps:  5/5   Hamstrin/5     Reflexes     Left Side  Quadriceps:  2+  Achilles:  2+  Ankle Clonus:  absent    Right Side   Quadriceps:  2+  Achilles:  2+  Ankle Clonus:  absent      Assessment  Lumbar Spondylosis  Lumbar Degenerative Disc Disease    1. 79 y.o. year old patient with PMH of   Past Medical History:   Diagnosis Date    Adenomatous polyp of colon     Arthritis     Back pain     Carotid artery plaque     Less than 50%    Chronic kidney disease, stage 3     Diabetes mellitus, type 2     Diabetes with neurologic complications     Erectile dysfunction following radical prostatectomy      Fractures     Hyperlipidemia LDL goal < 100     Hypertension goal BP (blood pressure) < 130/80     Lacunar stroke 8/17/2017    MCI (mild cognitive impairment)     Parkinson disease     Parkinsonism 4/11/2017    Prostate cancer 2001    Renal manifestation of secondary diabetes mellitus     Stroke       presenting with pain located lumbar spine  2. Pain Generators / Etiology: Lumbar Spondylosis and Lumbar Degenerative Disc Disease  3. Failed Meds (E- Effective, NE- Not Effective):  Tylenol-effective  4. Physical Therapy - Completed in the Past  5. Psychological comorbidities - None  6. Anticoagulants / Antiplatelets: Aspirin 81mg     PLAN:  1. Medications:  Continue Tylenol over-the-counter not to exceed 3000 mg daily    2. PT - patient will start home health physical therapy tomorrow  3. Psychological - none  4. Labs - obtain  none  5. Imaging - obtain  none; reviewed lumbar x-ray and MRI patient today in clinic  6. Interventions - schedule right lumbar radiofrequency ablation targeting the L4/5 and L5/S1 facet joints  7. Referrals - none  8. Records - none  9. Follow up visit - follow up in clinic 8 weeks after the procedure  10. Patient Questions - answered all of the patient's questions regarding diagnosis, therapy, and treatment  11.  This condition does not require this patient to take time off of work    CLAUDIA Baltazar MD  Interventional Pain  [facility] [place]

## 2020-02-14 PROCEDURE — G0180 MD CERTIFICATION HHA PATIENT: HCPCS | Mod: ,,, | Performed by: FAMILY MEDICINE

## 2020-02-14 PROCEDURE — G0180 PR HOME HEALTH MD CERTIFICATION: ICD-10-PCS | Mod: ,,, | Performed by: FAMILY MEDICINE

## 2020-02-17 ENCOUNTER — OFFICE VISIT (OUTPATIENT)
Dept: NEUROLOGY | Facility: CLINIC | Age: 80
End: 2020-02-17
Payer: MEDICARE

## 2020-02-17 ENCOUNTER — TELEPHONE (OUTPATIENT)
Dept: PAIN MEDICINE | Facility: CLINIC | Age: 80
End: 2020-02-17

## 2020-02-17 VITALS
HEART RATE: 82 BPM | BODY MASS INDEX: 22.1 KG/M2 | DIASTOLIC BLOOD PRESSURE: 82 MMHG | WEIGHT: 172.19 LBS | SYSTOLIC BLOOD PRESSURE: 152 MMHG | HEIGHT: 74 IN

## 2020-02-17 DIAGNOSIS — G20.A1 PARKINSON DISEASE: Primary | ICD-10-CM

## 2020-02-17 PROCEDURE — 99214 OFFICE O/P EST MOD 30 MIN: CPT | Mod: HCNC,S$GLB,, | Performed by: PSYCHIATRY & NEUROLOGY

## 2020-02-17 PROCEDURE — 1126F PR PAIN SEVERITY QUANTIFIED, NO PAIN PRESENT: ICD-10-PCS | Mod: HCNC,S$GLB,, | Performed by: PSYCHIATRY & NEUROLOGY

## 2020-02-17 PROCEDURE — 3077F PR MOST RECENT SYSTOLIC BLOOD PRESSURE >= 140 MM HG: ICD-10-PCS | Mod: HCNC,CPTII,S$GLB, | Performed by: PSYCHIATRY & NEUROLOGY

## 2020-02-17 PROCEDURE — 3079F DIAST BP 80-89 MM HG: CPT | Mod: HCNC,CPTII,S$GLB, | Performed by: PSYCHIATRY & NEUROLOGY

## 2020-02-17 PROCEDURE — 1101F PT FALLS ASSESS-DOCD LE1/YR: CPT | Mod: HCNC,CPTII,S$GLB, | Performed by: PSYCHIATRY & NEUROLOGY

## 2020-02-17 PROCEDURE — 99214 PR OFFICE/OUTPT VISIT, EST, LEVL IV, 30-39 MIN: ICD-10-PCS | Mod: HCNC,S$GLB,, | Performed by: PSYCHIATRY & NEUROLOGY

## 2020-02-17 PROCEDURE — 1101F PR PT FALLS ASSESS DOC 0-1 FALLS W/OUT INJ PAST YR: ICD-10-PCS | Mod: HCNC,CPTII,S$GLB, | Performed by: PSYCHIATRY & NEUROLOGY

## 2020-02-17 PROCEDURE — 3079F PR MOST RECENT DIASTOLIC BLOOD PRESSURE 80-89 MM HG: ICD-10-PCS | Mod: HCNC,CPTII,S$GLB, | Performed by: PSYCHIATRY & NEUROLOGY

## 2020-02-17 PROCEDURE — 1159F PR MEDICATION LIST DOCUMENTED IN MEDICAL RECORD: ICD-10-PCS | Mod: HCNC,S$GLB,, | Performed by: PSYCHIATRY & NEUROLOGY

## 2020-02-17 PROCEDURE — 1126F AMNT PAIN NOTED NONE PRSNT: CPT | Mod: HCNC,S$GLB,, | Performed by: PSYCHIATRY & NEUROLOGY

## 2020-02-17 PROCEDURE — 1159F MED LIST DOCD IN RCRD: CPT | Mod: HCNC,S$GLB,, | Performed by: PSYCHIATRY & NEUROLOGY

## 2020-02-17 PROCEDURE — 99999 PR PBB SHADOW E&M-EST. PATIENT-LVL IV: ICD-10-PCS | Mod: PBBFAC,HCNC,, | Performed by: PSYCHIATRY & NEUROLOGY

## 2020-02-17 PROCEDURE — 3077F SYST BP >= 140 MM HG: CPT | Mod: HCNC,CPTII,S$GLB, | Performed by: PSYCHIATRY & NEUROLOGY

## 2020-02-17 PROCEDURE — 99999 PR PBB SHADOW E&M-EST. PATIENT-LVL IV: CPT | Mod: PBBFAC,HCNC,, | Performed by: PSYCHIATRY & NEUROLOGY

## 2020-02-17 RX ORDER — CITALOPRAM 20 MG/1
20 TABLET, FILM COATED ORAL DAILY
Qty: 90 TABLET | Refills: 3 | Status: SHIPPED | OUTPATIENT
Start: 2020-02-17 | End: 2020-07-31 | Stop reason: ALTCHOICE

## 2020-02-17 RX ORDER — CARBIDOPA AND LEVODOPA 25; 100 MG/1; MG/1
1 TABLET ORAL 3 TIMES DAILY
Qty: 270 TABLET | Refills: 3 | Status: SHIPPED | OUTPATIENT
Start: 2020-02-17 | End: 2020-09-03 | Stop reason: SDUPTHER

## 2020-02-17 NOTE — TELEPHONE ENCOUNTER
Contacted pt's wife. Instructed to dc ASA (ok per pcp ) fish oil, and tumeric 1 week prior to injection as well as all asa products (advil, motrin, ibuprofen, excedrine, aleve) and ALL MULTIBITAMINS AND SUPPLEMENT. Pt's spouse was able to verbalize all understanding. All questions answered.//lp

## 2020-02-17 NOTE — TELEPHONE ENCOUNTER
----- Message from Deja Thomson sent at 2/17/2020  4:45 PM CST -----  Contact: Patient's wife- Joaquin  Please call patient to verify which medications he need to stop taking prior to his 02/25/2020 procedure. Please call at Ph .[phone].

## 2020-02-17 NOTE — PROGRESS NOTES
[facility] - NEUROLOGY EPILEPSY  [facility] [place]    Date: February 17, 2020   Patient Name: Jagdeep Greenberg   MRN: [id number]   PCP: Panda Contreras  Referring Provider: No ref. provider found    Assessmentn and Plan:   Jagdeep Greenberg is a 79 y.o. male Presenting in follow-up for parkinsonism.  Increasing Sinemet to 1 tab TID, ordered adaptive equipment below. Patient getting home health PT. Patient to f/u with movement disorders division following move.    Problem List Items Addressed This Visit     None      Visit Diagnoses     Parkinson disease    -  Primary    Relevant Orders    TRANSFER TUB BENCH FOR HOME USE    HOSPITAL BED FOR HOME USE    WHEELCHAIR FOR HOME USE        Ernie Harden MD  [facility]   Department of Neurology    Patient note was created using Dragon Dictation.  Any errors in syntax or even information may not have been identified and edited on initial review prior to signing this note.  Subjective:   HPI:   Mr. Jagdeep Greenberg is a 79 y.o. male Presenting in follow-up for parkinsonism.  Patient presents today with his wife who contributes to the history.  Together they report that the patient progressive decline.  His wife reports that he recently suffered a fall due to when he describes as festination.  He reports his tremor is currently well controlled but does feel that his mood has deteriorated somewhat.  He has been sad to have to move from his current home.  He and his wife are relocating to the [place]  To be closer to family.  They will require additional adaptive equipment for him to successfully function in their new home.  He denies changes in behavior or sleep.    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    MCI (mild cognitive impairment)     Parkinson disease     Parkinsonism 4/11/2017    Prostate cancer 2001    Renal manifestation of secondary diabetes mellitus     Stroke      PAST SURGICAL HISTORY:  Past Surgical History:   Procedure Laterality Date    APPENDECTOMY      COLONOSCOPY  In 3/2/2010    Repeat 5 years    COLONOSCOPY N/A 9/29/2015    Procedure: COLONOSCOPY;  Surgeon: Erik Brandt MD;  Location: [facility] ENDO;  Service: Endoscopy;  Laterality: N/A;    COLONOSCOPY N/A 10/11/2016    Procedure: COLONOSCOPY;  Surgeon: Erik Brandt MD;  Location: [facility] ENDO;  Service: Endoscopy;  Laterality: N/A;    COLONOSCOPY N/A 10/24/2017    Procedure: COLONOSCOPY;  Surgeon: Erik Brandt MD;  [facility];  Service: Endoscopy;  Laterality: N/A;    COLONOSCOPY N/A 8/23/2018    Procedure: COLONOSCOPY;  Surgeon: Nghia Cooper MD;  [facility]  Service: Endoscopy;  Laterality: N/A;    FRACTURE SURGERY      HERNIA REPAIR      INJECTION OF FACET JOINT Right 7/23/2019    Procedure: INJECTION, FACET JOINT--Right L3,4,5,S1;  Surgeon: Jax Nelson Jr., MD;  Location: [facility] PAIN MGT;  Service: Pain Management;  Laterality: Right;  Pt is diabectic    LYMPH NODE DISSECTION      Pelvic    PROSTATECTOMY      SHOULDER ARTHROSCOPY      Right     CURRENT MEDS:  Current Outpatient Medications   Medication Sig Dispense Refill    aspirin (ECOTRIN) 81 MG EC tablet Take 1 tablet (81 mg total) by mouth once daily.  0    blood sugar diagnostic (GLUCOSE BLOOD) Strp Test glucose 1 x daily 35 strip prn    carbidopa-levodopa  mg (SINEMET)  mg per tablet Take 1 tablet by mouth 3 (three) times daily. 270 tablet 3    diclofenac sodium (VOLTAREN) 1 % Gel Apply 2 g  topically 4 (four) times daily as needed. 100 g 5    fish oil-omega-3 fatty acids 300-1,000 mg capsule Take 2 g by mouth once daily.      glimepiride (AMARYL) 2 MG tablet TAKE 1 TABLET(2 MG) BY MOUTH DAILY WITH BREAKFAST 90 tablet 0    GLUC.METER,DIS.P-LOADED STRIPS (GLUCOSE METER, DISP & STRIPS) Kit Check glucose once per day 1 kit prn    GLUCOSAMINE HCL (GLUCOSAMINE, BULK, MISC) by Misc.(Non-Drug; Combo Route) route.      L. RHAMNOSUS GG/INULIN (CULTURELLE PROBIOTICS ORAL) Take by mouth.      lancets Misc As directed 100 each prn    metFORMIN (GLUCOPHAGE) 1000 MG tablet Take 1 tablet (1,000 mg total) by mouth once daily. 90 tablet 3    predniSONE (DELTASONE) 2.5 MG tablet Take 1 tablet (2.5 mg total) by mouth 2 (two) times daily. 180 tablet 3    turmeric root extract 500 mg Cap Take by mouth.      citalopram (CELEXA) 20 MG tablet Take 1 tablet (20 mg total) by mouth once daily. 90 tablet 3    donepezil (ARICEPT) 5 MG tablet Take 1 tablet (5 mg total) by mouth every evening. 90 tablet 3    pravastatin (PRAVACHOL) 20 MG tablet Take 1 tablet (20 mg total) by mouth once daily. (Patient not taking: Reported on 2/17/2020) 90 tablet 3     No current facility-administered medications for this visit.      ALLERGIES:  Review of patient's allergies indicates:  No Known Allergies    FAMILY HISTORY:  Family History   Problem Relation Age of Onset    Leukemia Father 68    Anesthesia problems Neg Hx      SOCIAL HISTORY:  Social History     Tobacco Use    Smoking status: Never Smoker    Smokeless tobacco: Never Used   Substance Use Topics    Alcohol use: Yes     Alcohol/week: 35.0 standard drinks     Types: 14 Cans of beer, 21 Shots of liquor per week     Comment: 3 drinks a day    Drug use: No     Review of Systems:  12 review of systems is negative except for the symptoms mentioned in HPI.      Objective:     Vitals:    02/17/20 1059   BP: (!) 152/82   Pulse: 82       General: NAD, well nourished   Eyes: no  tearing, discharge, no erythema   ENT: moist mucous membranes of the oral cavity, nares patent    Neck: Supple, full range of motion  Cardiovascular: Warm and well perfused, pulses equal and symmetrical  Lungs: Normal work of breathing, normal chest wall excursions  Skin: No rash, lesions, or breakdown on exposed skin  Psychiatry: Mood and affect are appropriate   Abdomen: soft, non tender, non distended  Extremeties: No cyanosis, clubbing or edema.    Neurological   MENTAL STATUS: Alert and oriented to person, place, and time. Attention and concentration within normal limits. Speech without dysarthria, able to name and repeat with moderate Recent and remote memory fair to poor   CRANIAL NERVES: Visual fields intact. PERRL. EOMI with slow saccades. Facial sensation intact. Face symmetrical with hypomimia. Hearing grossly intact. Full shoulder shrug bilaterally. Tongue protrudes midline   SENSORY: Sensation is intact to light touch throughout.    MOTOR: Normal bulk and increased tone in RUE>LUE. No resting tremor noted. 5/5 deltoid, biceps, triceps, interosseous, hand  bilaterally. 5/5 iliopsoas, knee extension/flexion, foot dorsi/plantarflexion bilaterally.    REFLEXES: Symmetric and  1 throughout.   CEREBELLAR/COORDINATION/GAIT: Gait with normal stride and reduced arm swing R>L with remeregent tremor on R. Positive pull back test.  BRAULIO slowed L>R.    MOCA Results 17:   Visuospatial/Executive: 3/5  Namin/3  Attention:   Language: 1/3  Abstraction: 12  Delayed Recall: 0/5  Orientation:   Total:

## 2020-02-24 ENCOUNTER — EXTERNAL HOME HEALTH (OUTPATIENT)
Dept: HOME HEALTH SERVICES | Facility: HOSPITAL | Age: 80
End: 2020-02-24
Payer: MEDICARE

## 2020-02-24 ENCOUNTER — TELEPHONE (OUTPATIENT)
Dept: PAIN MEDICINE | Facility: CLINIC | Age: 80
End: 2020-02-24

## 2020-02-24 NOTE — TELEPHONE ENCOUNTER
Spoke with    Patients wife    regarding procedure scheduled on 2/25/2020  Arrival time 0730  Has patient been sick with fever or on antibiotics within the last 7 days? no  Has patient received a vaccination within the last 7 days? no  Has the patient stopped all medications as directed?  Yes, asprin and fish oil  last dose on 2/17/2020  Does patient have a pacemaker and or defibrillator? no  Does the patient have a ride to and from procedure and someone reliable to remain with patient? Yes, wife gonzalo  Is the patient diabetic? yes  Does the patient have sleep apnea? Or use O2 at home? No no  Is the patient receiving sedation? yes  Is the patient instructed to remain NPO beginning at midnight the night before their procedure? yes  Procedure location confirmed with patient? yes  Travel screening: negative    CONFIRMED//LP

## 2020-02-25 ENCOUNTER — HOSPITAL ENCOUNTER (OUTPATIENT)
Facility: HOSPITAL | Age: 80
Discharge: HOME | End: 2020-02-25
Attending: PAIN MEDICINE | Admitting: PAIN MEDICINE
Payer: MEDICARE

## 2020-02-25 VITALS
WEIGHT: 163.94 LBS | DIASTOLIC BLOOD PRESSURE: 78 MMHG | HEIGHT: 75 IN | HEART RATE: 58 BPM | TEMPERATURE: 98 F | OXYGEN SATURATION: 97 % | BODY MASS INDEX: 20.38 KG/M2 | RESPIRATION RATE: 15 BRPM | SYSTOLIC BLOOD PRESSURE: 167 MMHG

## 2020-02-25 DIAGNOSIS — M47.816 LUMBAR SPONDYLOSIS: Primary | ICD-10-CM

## 2020-02-25 LAB — POCT GLUCOSE: 180 MG/DL (ref 70–110)

## 2020-02-25 PROCEDURE — 82962 GLUCOSE BLOOD TEST: CPT | Mod: HCNC | Performed by: PAIN MEDICINE

## 2020-02-25 PROCEDURE — 25000003 PHARM REV CODE 250: Mod: HCNC | Performed by: PAIN MEDICINE

## 2020-02-25 PROCEDURE — 64636 DESTROY L/S FACET JNT ADDL: CPT | Mod: HCNC,RT,, | Performed by: PAIN MEDICINE

## 2020-02-25 PROCEDURE — 99152 MOD SED SAME PHYS/QHP 5/>YRS: CPT | Mod: HCNC | Performed by: PAIN MEDICINE

## 2020-02-25 PROCEDURE — 64635 PR DESTROY LUMB/SAC FACET JNT: ICD-10-PCS | Mod: HCNC,RT,, | Performed by: PAIN MEDICINE

## 2020-02-25 PROCEDURE — S0020 INJECTION, BUPIVICAINE HYDRO: HCPCS | Mod: HCNC | Performed by: PAIN MEDICINE

## 2020-02-25 PROCEDURE — 63600175 PHARM REV CODE 636 W HCPCS: Mod: HCNC | Performed by: PAIN MEDICINE

## 2020-02-25 PROCEDURE — 99152 MOD SED SAME PHYS/QHP 5/>YRS: CPT | Mod: HCNC,,, | Performed by: PAIN MEDICINE

## 2020-02-25 PROCEDURE — 64636 PR DESTROY L/S FACET JNT ADDL: ICD-10-PCS | Mod: HCNC,RT,, | Performed by: PAIN MEDICINE

## 2020-02-25 PROCEDURE — 64635 DESTROY LUMB/SAC FACET JNT: CPT | Mod: HCNC,RT,, | Performed by: PAIN MEDICINE

## 2020-02-25 PROCEDURE — 64635 DESTROY LUMB/SAC FACET JNT: CPT | Mod: HCNC | Performed by: PAIN MEDICINE

## 2020-02-25 PROCEDURE — 99152 PR MOD CONSCIOUS SEDATION, SAME PHYS, 5+ YRS, FIRST 15 MIN: ICD-10-PCS | Mod: HCNC,,, | Performed by: PAIN MEDICINE

## 2020-02-25 PROCEDURE — 64636 DESTROY L/S FACET JNT ADDL: CPT | Mod: HCNC | Performed by: PAIN MEDICINE

## 2020-02-25 RX ORDER — LIDOCAINE HYDROCHLORIDE 20 MG/ML
INJECTION, SOLUTION EPIDURAL; INFILTRATION; INTRACAUDAL; PERINEURAL
Status: DISCONTINUED | OUTPATIENT
Start: 2020-02-25 | End: 2020-02-25 | Stop reason: HOSPADM

## 2020-02-25 RX ORDER — AMLODIPINE BESYLATE 10 MG/1
TABLET ORAL
Qty: 90 TABLET | Refills: 0 | OUTPATIENT
Start: 2020-02-25

## 2020-02-25 RX ORDER — BUPIVACAINE HYDROCHLORIDE 5 MG/ML
INJECTION, SOLUTION EPIDURAL; INTRACAUDAL
Status: DISCONTINUED | OUTPATIENT
Start: 2020-02-25 | End: 2020-02-25 | Stop reason: HOSPADM

## 2020-02-25 RX ORDER — FENTANYL CITRATE 50 UG/ML
INJECTION, SOLUTION INTRAMUSCULAR; INTRAVENOUS
Status: DISCONTINUED | OUTPATIENT
Start: 2020-02-25 | End: 2020-02-25 | Stop reason: HOSPADM

## 2020-02-25 RX ORDER — METFORMIN HYDROCHLORIDE 1000 MG/1
TABLET ORAL
Qty: 90 TABLET | Refills: 3 | Status: SHIPPED | OUTPATIENT
Start: 2020-02-25 | End: 2020-11-12

## 2020-02-25 NOTE — PLAN OF CARE
Pt VSS on RA, Pt AOx3 and at baseline. POC and discharge instructions reviewed with pt and spouse who verbalized understanding. Pt ready for d/c at this time. WCTM

## 2020-02-25 NOTE — DISCHARGE INSTRUCTIONS

## 2020-02-25 NOTE — PLAN OF CARE
Pt getting dressed & removed bandaids to lower back. bandaids replaced with 3 small bandaids, CDI. WCTM

## 2020-02-25 NOTE — OP NOTE
PROCEDURE: Lumbar medial branch radiofrequency (RF) ablation under fluoroscopic guidance    SIDE: right    LEVEL:   Sacral ala (Corresponding to the L5 dorsal ramus),   L5 transverse process (Corresponding to the L4 medial branch),   L4 transverse process (Corresponding to the L3 medial branch),     PROCEDURE DATE: 2/25/2020    PREOPERATIVE DIAGNOSIS: Lumbar spondylosis  POSTOPERATIVE DIAGNOSIS: Lumbar spondylosis    PROVIDER: CLAUDIA Baltazar MD  Assistant(s): None    ANESTHESIA: Local, IV Sedation    >> 0 mg of VERSED    >> 50 mcg of FENTANYL     INDICATION: The patient has low back pain without radiculopathy symptoms unresponsive to conservative treatments. Fluoroscopy was used to optimize visualization of needle placement and to maximize safety.        PROCEDURE DESCRIPTION / TECHNIQUE:   The patient was seen and identified in the preoperative area. Risks, benefits, complications, and alternatives were discussed with the patient. The patient agreed to proceed with the procedure and signed the consent. The site and side of the procedure was identified and marked. An iv was started.    The patient was taken to the procedural suite. The patient was positioned in prone orientation on procedure table and a pillow was placed under the abdomen to reduce lumbar lordosis. A time out was performed prior to any intervention. The procedure, site, side, and allergies were stated and agreed to by all present. The lumbosacral area was widely prepped with ChloraPrep. The procedural site was draped in usual sterile fashion. Vital signs were closely monitored throughout this procedure. Conscious sedation was used for this procedure to decrease patient anxiety.    Using AP fluoroscopy, the junction of the vetebral transverse process (TP) and the superior articular process (SAP) at the above noted levels was identified. The skin caudal and oblique to the SAP-TP junctions described above was marked. Superficial tissues were localized  with 2% PF Lidocaine at each level. [employer] 100 mm 20-gauge radiofrequency cannulae with curved 10-mm active tips were advanced to the above noted TP-SAP junction until osseus contact was made. The needle was walked off of the sulcus. The fluoroscope was obliqued to the ipsilateral side and the needles were repositioned as needed until the active tip was aligned along the base of the SAP and the needle tip met the anterior fluoroscopic shadow of the SAP. Lateral fluoroscopic imaging was captured and the needle tips were adjusted such that the tips extended into the SAP shadow, but not beyond the SAP silhouette into the intervertebral foramen. After satisfactory cannula positioning was realized in true lateral orientation, the needle stylets were removed, RF electrodes were introduced, and sensory and motor testing was performed.     Nerves were tested sequentially. Sensory testing was not performed at 50 Hz up to  0.5 volt with production of concordant pain. Motor testing was performed at 2 Hz up to 1.5 volts with elicitation of mulitifidus muscle contraction and with no radicular pain, paresthesias, or distal muscle contraction beyond the buttocks produced during motor testing. Following testing, RF electrodes were removed and 1 mL of 2% lidocaine and 1ml of 0.5% bupivacaine was injected through each cannula following negative aspiration. The RF electrodes were then replaced and each targeted medial branch was sequentially subjected to thermal coagulation at 80 degrees Celsius for 90 seconds. Following the burn, 1ml of 0.5% bupivacaine was injected, RF electrodes were removed, stylets were replaced, and each cannua was removed intact.    Description of Findings: Not applicable    Prosthetic devices, grafts, tissues, or devices implanted: None    Specimen Removed: No    Estimated Blood Loss: minimal    COMPLICATIONS: None    DISPOSITION / PLANS: The patient was transferred to the recovery area in a stable condition  for observation. The patient was reexamined prior to discharge. There was no evidence of acute neurologic injury following the procedure.  Patient was discharged from the recovery room after meeting discharge criteria. Home discharge instructions were given to the patient by the staff.

## 2020-02-26 ENCOUNTER — PATIENT MESSAGE (OUTPATIENT)
Dept: FAMILY MEDICINE | Facility: CLINIC | Age: 80
End: 2020-02-26

## 2020-02-26 ENCOUNTER — PATIENT MESSAGE (OUTPATIENT)
Dept: NEUROLOGY | Facility: CLINIC | Age: 80
End: 2020-02-26

## 2020-02-27 NOTE — TELEPHONE ENCOUNTER
This was actually prescribed by the neurologist Dr. Harden.  I would send a copy of her note.  I can send additional information if they need as well.

## 2020-02-28 NOTE — DISCHARGE SUMMARY
[facility]  Short Stay  Discharge Summary    Admit Date: 2/25/2020    Discharge Date and Time: 2/25/2020  9:22 AM      Discharge Attending Physician: CLAUDIA Baltazar MD     Hospital Course (synopsis of major diagnoses, care, treatment, and services provided during the course of the hospital stay): Patient was admitted to Pre-op where informed consent was signed.  The patient was then taken to the procedure suite where the procedure was performed.  The patient was then return to the Pre-Op area and discharge was performed.     Final Diagnoses:    Principal Problem: <principal problem not specified>   Secondary Diagnoses:   Active Hospital Problems    Diagnosis  POA    Lumbar spondylosis [M47.816]  Yes      Resolved Hospital Problems   No resolved problems to display.       Discharged Condition: good    Disposition: Home or Self Care    Follow up/Patient Instructions:    Medications:  Reconciled Home Medications:      Medication List      CONTINUE taking these medications    aspirin 81 MG EC tablet  Commonly known as:  ECOTRIN  Take 1 tablet (81 mg total) by mouth once daily.     blood sugar diagnostic Strp  Commonly known as:  Blood Glucose Test  Test glucose 1 x daily     carbidopa-levodopa  mg  mg per tablet  Commonly known as:  SINEMET  Take 1 tablet by mouth 3 (three) times daily.     citalopram 20 MG tablet  Commonly known as:  CELEXA  Take 1 tablet (20 mg total) by mouth once daily.     CULTURELLE PROBIOTICS ORAL  Take by mouth.     diclofenac sodium 1 % Gel  Commonly known as:  VOLTAREN  Apply 2 g topically 4 (four) times daily as needed.     donepezil 5 MG tablet  Commonly known as:  ARICEPT  Take 1 tablet (5 mg total) by mouth every evening.     fish oil-omega-3 fatty acids 300-1,000 mg capsule  Take 2 g by mouth once daily.     glimepiride 2 MG tablet  Commonly known as:  AMARYL  TAKE 1 TABLET(2 MG) BY MOUTH DAILY WITH BREAKFAST     GLUCOSAMINE (BULK) MISC  by Misc.(Non-Drug;  Combo Route) route.     glucose meter,disposabl,strips Kit  Check glucose once per day     lancets Misc  As directed     pravastatin 20 MG tablet  Commonly known as:  PRAVACHOL  Take 1 tablet (20 mg total) by mouth once daily.     predniSONE 2.5 MG tablet  Commonly known as:  DELTASONE  Take 1 tablet (2.5 mg total) by mouth 2 (two) times daily.        STOP taking these medications    metFORMIN 1000 MG tablet  Commonly known as:  GLUCOPHAGE        ASK your doctor about these medications    turmeric root extract 500 mg Cap  Take by mouth.          Discharge Procedure Orders   Diet general     Call MD for:  severe uncontrolled pain     Call MD for:  difficulty breathing, headache or visual disturbances     Call MD for:  redness, tenderness, or signs of infection (pain, swelling, redness, odor or green/yellow discharge around incision site)     Activity as tolerated

## 2020-02-29 NOTE — PROGRESS NOTES
"Patient in need of a hospital bed.  He requires positioning of the body in ways not feasible with an ordinary bed to alleviate pain due to his lumbar disc disease, spondylosis and spinal stenosis.  He requires frequent and/or immediate changes in body positioning.  Vitals:    02/06/20 1013 02/06/20 1130 02/06/20 1131 02/06/20 1755   BP: 132/65 133/73 (!) 149/71 132/65   Patient Position:  Sitting Standing    Pulse: 68 62 68    Temp: 98.2 °F (36.8 °C)      TempSrc: Oral      Weight: 79.7 kg (175 lb 12.8 oz)      Height: 6' 2" (1.88 m)              "

## 2020-03-09 ENCOUNTER — PATIENT MESSAGE (OUTPATIENT)
Dept: FAMILY MEDICINE | Facility: CLINIC | Age: 80
End: 2020-03-09

## 2020-03-11 ENCOUNTER — PATIENT OUTREACH (OUTPATIENT)
Dept: ADMINISTRATIVE | Facility: OTHER | Age: 80
End: 2020-03-11

## 2020-03-12 ENCOUNTER — OFFICE VISIT (OUTPATIENT)
Dept: NEUROLOGY | Facility: CLINIC | Age: 80
End: 2020-03-12
Payer: MEDICARE

## 2020-03-12 VITALS
HEIGHT: 75 IN | SYSTOLIC BLOOD PRESSURE: 167 MMHG | DIASTOLIC BLOOD PRESSURE: 74 MMHG | HEART RATE: 63 BPM | BODY MASS INDEX: 20.49 KG/M2

## 2020-03-12 DIAGNOSIS — G20.C PRIMARY PARKINSONISM: ICD-10-CM

## 2020-03-12 DIAGNOSIS — G31.84 MCI (MILD COGNITIVE IMPAIRMENT): ICD-10-CM

## 2020-03-12 DIAGNOSIS — R41.3 MEMORY LOSS: Primary | ICD-10-CM

## 2020-03-12 PROCEDURE — 3078F PR MOST RECENT DIASTOLIC BLOOD PRESSURE < 80 MM HG: ICD-10-PCS | Mod: HCNC,CPTII,S$GLB, | Performed by: PSYCHIATRY & NEUROLOGY

## 2020-03-12 PROCEDURE — 99215 PR OFFICE/OUTPT VISIT, EST, LEVL V, 40-54 MIN: ICD-10-PCS | Mod: HCNC,S$GLB,, | Performed by: PSYCHIATRY & NEUROLOGY

## 2020-03-12 PROCEDURE — 99999 PR PBB SHADOW E&M-EST. PATIENT-LVL III: CPT | Mod: PBBFAC,HCNC,, | Performed by: PSYCHIATRY & NEUROLOGY

## 2020-03-12 PROCEDURE — 99215 OFFICE O/P EST HI 40 MIN: CPT | Mod: HCNC,S$GLB,, | Performed by: PSYCHIATRY & NEUROLOGY

## 2020-03-12 PROCEDURE — 3077F SYST BP >= 140 MM HG: CPT | Mod: HCNC,CPTII,S$GLB, | Performed by: PSYCHIATRY & NEUROLOGY

## 2020-03-12 PROCEDURE — 3288F PR FALLS RISK ASSESSMENT DOCUMENTED: ICD-10-PCS | Mod: HCNC,CPTII,S$GLB, | Performed by: PSYCHIATRY & NEUROLOGY

## 2020-03-12 PROCEDURE — 1100F PR PT FALLS ASSESS DOC 2+ FALLS/FALL W/INJURY/YR: ICD-10-PCS | Mod: HCNC,CPTII,S$GLB, | Performed by: PSYCHIATRY & NEUROLOGY

## 2020-03-12 PROCEDURE — 3077F PR MOST RECENT SYSTOLIC BLOOD PRESSURE >= 140 MM HG: ICD-10-PCS | Mod: HCNC,CPTII,S$GLB, | Performed by: PSYCHIATRY & NEUROLOGY

## 2020-03-12 PROCEDURE — 1125F AMNT PAIN NOTED PAIN PRSNT: CPT | Mod: HCNC,S$GLB,, | Performed by: PSYCHIATRY & NEUROLOGY

## 2020-03-12 PROCEDURE — 1159F MED LIST DOCD IN RCRD: CPT | Mod: HCNC,S$GLB,, | Performed by: PSYCHIATRY & NEUROLOGY

## 2020-03-12 PROCEDURE — 99999 PR PBB SHADOW E&M-EST. PATIENT-LVL III: ICD-10-PCS | Mod: PBBFAC,HCNC,, | Performed by: PSYCHIATRY & NEUROLOGY

## 2020-03-12 PROCEDURE — 1100F PTFALLS ASSESS-DOCD GE2>/YR: CPT | Mod: HCNC,CPTII,S$GLB, | Performed by: PSYCHIATRY & NEUROLOGY

## 2020-03-12 PROCEDURE — 99499 RISK ADDL DX/OHS AUDIT: ICD-10-PCS | Mod: HCNC,S$GLB,, | Performed by: PSYCHIATRY & NEUROLOGY

## 2020-03-12 PROCEDURE — 1125F PR PAIN SEVERITY QUANTIFIED, PAIN PRESENT: ICD-10-PCS | Mod: HCNC,S$GLB,, | Performed by: PSYCHIATRY & NEUROLOGY

## 2020-03-12 PROCEDURE — 99499 UNLISTED E&M SERVICE: CPT | Mod: HCNC,S$GLB,, | Performed by: PSYCHIATRY & NEUROLOGY

## 2020-03-12 PROCEDURE — 3078F DIAST BP <80 MM HG: CPT | Mod: HCNC,CPTII,S$GLB, | Performed by: PSYCHIATRY & NEUROLOGY

## 2020-03-12 PROCEDURE — 3288F FALL RISK ASSESSMENT DOCD: CPT | Mod: HCNC,CPTII,S$GLB, | Performed by: PSYCHIATRY & NEUROLOGY

## 2020-03-12 PROCEDURE — 1159F PR MEDICATION LIST DOCUMENTED IN MEDICAL RECORD: ICD-10-PCS | Mod: HCNC,S$GLB,, | Performed by: PSYCHIATRY & NEUROLOGY

## 2020-03-12 NOTE — PROGRESS NOTES
MOVEMENT DISORDERS CLINIC NEW CONSULT NOTE    PCP/Referring Provider: Aaareferral Self  No address on file  Date of Service: 3/12/2020    Chief Complaint: PDism    HPI: Jagdeep Greenberg is a R HANDED 79 y.o. male with a medical issues significant for PD, MCI, Lumbar spinal disease s/p surgery, CKD, CAD, Polymyalgia rheumatica, Prostate cancer, DMII, who presents for establishment of care, seen by Dr. Harden in the past. He notes 3 years ago he had pain in his joints and neuro consult weas ordered. He noticed generalized slowing of movements. Dr. Harden noted L sided lack of arm swing. He started furniture surfing for stability. He got a cane 6 months ago. At this point he can walk a dawson;f block before fatiguing. Since December 2019 he's had a decline in gait. He's fallen several times since. No FOG.  Tremor started 3 months ago. Tremor is minor and resting. Handwriting has declined. At times he's spilled food.    He likes ballroom dancing    Medication history:  He's been on carbidopa/levodopa 25/100mg 1 tab PO TID   Unclear ONtime    PD Review of Symptoms:  Anosmia: 3 years  Dysarthria/Hypophonia: Mild  Dysphagia/Sialorrhea: Mild  Depression: started celexa  Cognitive slowing: memory decline notable- Last MOCA 17  Hallucinations: sees objects out iof the corner of his eyes  Impulsivity: none  Urinary changes: none  Constipation: none  Orthostasis: Occasional since 6 mos - had to cut BP medication  Erectile Dysfunction: since prostate cancer  Dyskinesia: none  Falls: as above  Freezing: none  Micrographia: yes  Sleep issues:  -RBD: talks    Review of Systems:   Review of Systems   Constitutional: Negative for fever.   HENT: Negative for congestion.    Eyes: Negative for double vision.   Respiratory: Negative for cough and shortness of breath.    Cardiovascular: Negative for chest pain and leg swelling.   Gastrointestinal: Negative for nausea.   Genitourinary: Negative for dysuria.   Musculoskeletal: Positive for  falls.   Skin: Negative for rash.   Neurological: Positive for tremors and speech change. Negative for headaches.   Psychiatric/Behavioral: Negative for depression.         Current Medications:  Outpatient Encounter Medications as of 3/12/2020   Medication Sig Dispense Refill    aspirin (ECOTRIN) 81 MG EC tablet Take 1 tablet (81 mg total) by mouth once daily.  0    blood sugar diagnostic (GLUCOSE BLOOD) Strp Test glucose 1 x daily 35 strip prn    carbidopa-levodopa  mg (SINEMET)  mg per tablet Take 1 tablet by mouth 3 (three) times daily. 270 tablet 3    citalopram (CELEXA) 20 MG tablet Take 1 tablet (20 mg total) by mouth once daily. 90 tablet 3    donepezil (ARICEPT) 5 MG tablet Take 1 tablet (5 mg total) by mouth every evening. 90 tablet 3    fish oil-omega-3 fatty acids 300-1,000 mg capsule Take 2 g by mouth once daily.      glimepiride (AMARYL) 2 MG tablet TAKE 1 TABLET(2 MG) BY MOUTH DAILY WITH BREAKFAST 90 tablet 0    GLUC.METER,DIS.P-LOADED STRIPS (GLUCOSE METER, DISP & STRIPS) Kit Check glucose once per day 1 kit prn    GLUCOSAMINE HCL (GLUCOSAMINE, BULK, MISC) by Misc.(Non-Drug; Combo Route) route.      L. RHAMNOSUS GG/INULIN (CULTURELLE PROBIOTICS ORAL) Take by mouth.      lancets Misc As directed 100 each prn    metFORMIN (GLUCOPHAGE) 1000 MG tablet TAKE 1 TABLET(1000 MG) BY MOUTH EVERY DAY 90 tablet 3    pravastatin (PRAVACHOL) 20 MG tablet Take 1 tablet (20 mg total) by mouth once daily. 90 tablet 3    predniSONE (DELTASONE) 2.5 MG tablet Take 1 tablet (2.5 mg total) by mouth 2 (two) times daily. 180 tablet 3    turmeric root extract 500 mg Cap Take by mouth.      diclofenac sodium (VOLTAREN) 1 % Gel Apply 2 g topically 4 (four) times daily as needed. 100 g 5     No facility-administered encounter medications on file as of 3/12/2020.        Past Medical History:  Patient Active Problem List   Diagnosis    Hypertension associated with diabetes    Combined hyperlipidemia  associated with type 2 diabetes mellitus    Type 2 diabetes mellitus with diabetic neuropathy, without long-term current use of insulin    History of prostate cancer    H/O adenomatous polyp of colon    Carotid artery plaque    PMR (polymyalgia rheumatica)    History of colon polyps    Colon polyps    Diverticulosis of large intestine without hemorrhage    Primary osteoarthritis involving multiple joints    Chronic kidney disease, stage 3    Type 2 diabetes mellitus with stage 3 chronic kidney disease    Parkinsonism    Chronic midline low back pain with bilateral sciatica    Chronic gluteal pain    Chronic left shoulder pain    Dupuytren's contracture of both hands    Primary osteoarthritis of left knee    Lacunar stroke    MCI (mild cognitive impairment)    Polyp of colon    Excessive drinking alcohol    Gait abnormality    Lumbar radiculopathy    Lumbar spondylosis    DDD (degenerative disc disease), lumbar    Spinal stenosis of lumbar region with neurogenic claudication    Chronic pain       Past Surgical History:  Past Surgical History:   Procedure Laterality Date    APPENDECTOMY      COLONOSCOPY  In 3/2/2010    Repeat 5 years    COLONOSCOPY N/A 9/29/2015    Procedure: COLONOSCOPY;  Surgeon: Erik Brandt MD;  Location: [facility];  Service: Endoscopy;  Laterality: N/A;    COLONOSCOPY N/A 10/11/2016    Procedure: COLONOSCOPY;  Surgeon: Erik Brandt MD;  Location: [facility];  Service: Endoscopy;  Laterality: N/A;    COLONOSCOPY N/A 10/24/2017    Procedure: COLONOSCOPY;  Surgeon: Erik Brandt MD;  Location: [facility];  Service: Endoscopy;  Laterality: N/A;    COLONOSCOPY N/A 8/23/2018    Procedure: COLONOSCOPY;  Surgeon: Nghia Cooper MD;  Location: [facility] ([address] [place] [address]);  Service: Endoscopy;  Laterality: N/A;    FRACTURE SURGERY      HERNIA REPAIR      INJECTION OF FACET JOINT Right 7/23/2019    Procedure: INJECTION, FACET JOINT--Right L3,4,5,S1;  Surgeon: Jax  "MARI Nelson Jr., MD;  Location: [facility] [place] [facility];  Service: Pain Management;  Laterality: Right;  Pt is diabectic    LYMPH NODE DISSECTION      Pelvic    PROSTATECTOMY      RADIOFREQUENCY THERMOCOAGULATION Right 2/25/2020    Procedure: Right L3-5 Lumbar RFA;  Surgeon: Pino Baltazar MD;  Location: [facility] [place] [facility];  Service: Pain Management;  Laterality: Right;    SHOULDER ARTHROSCOPY      Right       Current Living Situation: home    Social:  Social History     Socioeconomic History    Marital status:      Spouse name: Joaquin    Number of children: 0    Years of education: Not on file    Highest education level: Not on file   Occupational History    Occupation: Retired   Social Needs    Financial resource strain: Not on file    Food insecurity:     Worry: Not on file     Inability: Not on file    Transportation needs:     Medical: Not on file     Non-medical: Not on file   Tobacco Use    Smoking status: Never Smoker    Smokeless tobacco: Never Used   Substance and Sexual Activity    Alcohol use: Yes     Alcohol/week: 35.0 standard drinks     Types: 14 Cans of beer, 21 Shots of liquor per week     Comment: 3 drinks a day    Drug use: No    Sexual activity: Not on file   Lifestyle    Physical activity:     Days per week: Not on file     Minutes per session: Not on file    Stress: Not on file   Relationships    Social connections:     Talks on phone: Not on file     Gets together: Not on file     Attends Nondenominational service: Not on file     Active member of club or organization: Not on file     Attends meetings of clubs or organizations: Not on file     Relationship status: Not on file   Other Topics Concern    Not on file   Social History Narrative    Not on file       Family History:  Family History   Problem Relation Age of Onset    Leukemia Father 68    Anesthesia problems Neg Hx        PHYSICAL:  BP (!) 167/74 (BP Location: Left arm, Patient Position: Sitting)   Pulse 63   Ht 6' 3" (1.905 " m)   BMI 20.49 kg/m²     General Medical Examination:  General: Good hygiene, appropriate appearance.  HEENT: Normocephalic, atraumatic.   Neck: Supple.   Chest: Unlabored breathing.   CV: Symmetric pulses.   Ext: No clubbing, cyanosis, or edema.     Mental Status:  Mood/Affect: Appropriate/congruent.  Level of consciousness: Awake, alert.  Orientation: Oriented to person, place, time and situation.  Language: No Dysarthria    Cranial nerves:  I: Not tested  II: PERRL, VFF to counting  III, IV, VI: EOMI with conjugate gaze and no nystagmus on end gaze  V: Facial sensation intact and symmetric over the bilateral V1-V3  VII: Facial muscle activation intact and symmetric over the bilateral upper and lower face  VIII: Hearing intact in the b/l ears and symmetrical to finger rub  IX, X, XII: TUP midline - no atrophy or fasiculations  X: SCMs and shoulder shrug full strength b/l and symmetric    Motor:   -UE: 5/5 deltoids; 5/5 biceps, triceps; 5/5 wrist flexors, extensors; 5/5 interosseous; 5/5   -LEs: 5/5 hip flexion, extension; 5/5 knee flexion, extension; 5/5 ankle flexion, extension    Mild pill-rolling tremor right > left       DTRs:  ? Biceps Triceps Brachioradialis Knee Ankle   Left 2+ 2+  2+    Right 2+ 2+  2+        ? Finger taps Finger flicks BRAULIO Heel taps   Left 2+ 2+ 1+ 1+   Right 1+ 1+ 2+ 2+      Tone  ? Arm Leg   Left 2+     Right 1+      Sensation:   -Light touch: Intact and symmetric in the bilateral upper and lower extremities.  -Temp: Intact and symmetric in the bilateral upper and lower extremities.  -Vibration: mild vibration loss to the knees   -Pin prick: Intact and symmetric in the bilateral upper and lower extremities.  -Proprioception: Intact and symmetric in the bilateral upper and lower extremities.    Coordination:   -Finger to nose: nl  Patient is on     Gait:  -Arises from chair with use of hands, had to push twice   -Stoop is moderate  -Casual gait is: wide based  -Stride length:  mildly short   -Arm Swing: decreased bl  -Turnin step turn   -FOG: none        Assessment//Plan:   Problem List Items Addressed This Visit        Neuro    Parkinsonism    Current Assessment & Plan     Typical iPD. He appears under-dosed, and gait is affected.  Suggested Increase carbidopa/levodopa  mg to QID x 1 week   Then 1.5  mg QID              MCI (mild cognitive impairment)    Current Assessment & Plan     Possible PDD. Would suggest f/u thiamine levels, reversible panel.           Other Visit Diagnoses     Memory loss    -  Primary    Relevant Orders    VITAMIN B1          Discussed the importance of ongoing exercise in efforts to improve mobility and balance.  Suggested a diet high in antioxidants such as berries and green tea.    Courtney Malik MD, MS  [facility]  Department of Neurology  Movement Disorders

## 2020-03-12 NOTE — ASSESSMENT & PLAN NOTE
Typical iPD. He appears under-dosed, and gait is affected.  Suggested Increase carbidopa/levodopa  mg to QID x 1 week   Then 1.5  mg QID

## 2020-03-20 ENCOUNTER — EXTERNAL HOME HEALTH (OUTPATIENT)
Dept: HOME HEALTH SERVICES | Facility: HOSPITAL | Age: 80
End: 2020-03-20
Payer: MEDICARE

## 2020-04-06 ENCOUNTER — TELEPHONE (OUTPATIENT)
Dept: HOME HEALTH SERVICES | Facility: HOSPITAL | Age: 80
End: 2020-04-06

## 2020-04-06 ENCOUNTER — PATIENT MESSAGE (OUTPATIENT)
Dept: PAIN MEDICINE | Facility: CLINIC | Age: 80
End: 2020-04-06

## 2020-04-12 RX ORDER — PRAVASTATIN SODIUM 20 MG/1
TABLET ORAL
Qty: 90 TABLET | Refills: 2 | Status: SHIPPED | OUTPATIENT
Start: 2020-04-12 | End: 2020-11-12

## 2020-04-21 RX ORDER — GLIMEPIRIDE 2 MG/1
TABLET ORAL
Qty: 90 TABLET | Refills: 0 | Status: SHIPPED | OUTPATIENT
Start: 2020-04-21 | End: 2020-08-06

## 2020-05-06 ENCOUNTER — PATIENT MESSAGE (OUTPATIENT)
Dept: NEUROLOGY | Facility: CLINIC | Age: 80
End: 2020-05-06

## 2020-05-25 RX ORDER — DONEPEZIL HYDROCHLORIDE 5 MG/1
TABLET, FILM COATED ORAL
Qty: 90 TABLET | Refills: 3 | Status: SHIPPED | OUTPATIENT
Start: 2020-05-25 | End: 2020-10-19

## 2020-06-11 ENCOUNTER — TELEPHONE (OUTPATIENT)
Dept: NEUROLOGY | Facility: CLINIC | Age: 80
End: 2020-06-11

## 2020-06-11 ENCOUNTER — PATIENT OUTREACH (OUTPATIENT)
Dept: ADMINISTRATIVE | Facility: OTHER | Age: 80
End: 2020-06-11

## 2020-06-11 NOTE — TELEPHONE ENCOUNTER
"Spoke with Mrs. Greenberg, she was informed Mr. Greenberg appt for 6/12 will need to be converted to a virtual appt. Mrs. Greenberg became upset stating "my  needs to be seen, his appt was rescheduled the day before and now being rescheduled again but further out". I apologize to Mrs. Greenberg informing her the changes were made this morning. She was offered the first available which is an 8 am on 6/25/2020. Mrs. Greenberg denies the appt stating "I am 75 years old, I live so far away". She was offered the first mid day appt which is 7/31/2020 at 11:40. Mrs. Greenberg stating "I will take it but he needs attending to". Mrs. Greenberg was apologized to again  "

## 2020-06-11 NOTE — TELEPHONE ENCOUNTER
----- Message from Pamela Romano sent at 6/11/2020  4:38 PM CDT -----  Contact: pts wife   pts wife is calling to speak with the nurse they would like to change the pts appt to a viurtual appt for tomorrow pt can't wait until July to be seen can you please call pts wife at [phone].    CANDI

## 2020-06-12 ENCOUNTER — OFFICE VISIT (OUTPATIENT)
Dept: NEUROLOGY | Facility: CLINIC | Age: 80
End: 2020-06-12
Payer: MEDICARE

## 2020-06-12 DIAGNOSIS — G20.C PRIMARY PARKINSONISM: Primary | ICD-10-CM

## 2020-06-12 DIAGNOSIS — R42 DIZZINESS: ICD-10-CM

## 2020-06-12 DIAGNOSIS — G31.84 MCI (MILD COGNITIVE IMPAIRMENT): ICD-10-CM

## 2020-06-12 PROCEDURE — 1159F PR MEDICATION LIST DOCUMENTED IN MEDICAL RECORD: ICD-10-PCS | Mod: HCNC,95,, | Performed by: PSYCHIATRY & NEUROLOGY

## 2020-06-12 PROCEDURE — 1100F PR PT FALLS ASSESS DOC 2+ FALLS/FALL W/INJURY/YR: ICD-10-PCS | Mod: HCNC,CPTII,95, | Performed by: PSYCHIATRY & NEUROLOGY

## 2020-06-12 PROCEDURE — 3288F FALL RISK ASSESSMENT DOCD: CPT | Mod: HCNC,CPTII,95, | Performed by: PSYCHIATRY & NEUROLOGY

## 2020-06-12 PROCEDURE — 99215 OFFICE O/P EST HI 40 MIN: CPT | Mod: HCNC,95,, | Performed by: PSYCHIATRY & NEUROLOGY

## 2020-06-12 PROCEDURE — 99499 UNLISTED E&M SERVICE: CPT | Mod: HCNC,95,, | Performed by: PSYCHIATRY & NEUROLOGY

## 2020-06-12 PROCEDURE — 1100F PTFALLS ASSESS-DOCD GE2>/YR: CPT | Mod: HCNC,CPTII,95, | Performed by: PSYCHIATRY & NEUROLOGY

## 2020-06-12 PROCEDURE — 1159F MED LIST DOCD IN RCRD: CPT | Mod: HCNC,95,, | Performed by: PSYCHIATRY & NEUROLOGY

## 2020-06-12 PROCEDURE — 3288F PR FALLS RISK ASSESSMENT DOCUMENTED: ICD-10-PCS | Mod: HCNC,CPTII,95, | Performed by: PSYCHIATRY & NEUROLOGY

## 2020-06-12 PROCEDURE — 99499 RISK ADDL DX/OHS AUDIT: ICD-10-PCS | Mod: HCNC,95,, | Performed by: PSYCHIATRY & NEUROLOGY

## 2020-06-12 PROCEDURE — 99215 PR OFFICE/OUTPT VISIT, EST, LEVL V, 40-54 MIN: ICD-10-PCS | Mod: HCNC,95,, | Performed by: PSYCHIATRY & NEUROLOGY

## 2020-06-12 NOTE — ASSESSMENT & PLAN NOTE
Dizziness on standing likely from sinemet-induced orthostasis  Suggested BP log to screen, and may try midodrine

## 2020-06-12 NOTE — ASSESSMENT & PLAN NOTE
Typical iPD. He appears under-dosed, and gait is affected most. s he could not tolerate 1.5 tabs carbidopa/levodopa 25/100mg, I suspect orthostasis. See Orhtostasis.  Suggest PT

## 2020-06-12 NOTE — PROGRESS NOTES
"The patient location is: home  The chief complaint leading to consultation is: 20 mins    Visit type: audiovisual    Face to Face time with patient: 20mins  20mins minutes of total time spent on the encounter, which includes face to face time and non-face to face time preparing to see the patient (eg, review of tests), Obtaining and/or reviewing separately obtained history, Documenting clinical information in the electronic or other health record, Independently interpreting results (not separately reported) and communicating results to the patient/family/caregiver, or Care coordination (not separately reported).         Each patient to whom he or she provides medical services by telemedicine is:  (1) informed of the relationship between the physician and patient and the respective role of any other health care provider with respect to management of the patient; and (2) notified that he or she may decline to receive medical services by telemedicine and may withdraw from such care at any time.    Notes:       MOVEMENT DISORDERS CLINIC NEW CONSULT NOTE    PCP/Referring Provider: No referring provider defined for this encounter.  Date of Service: 6/12/2020    Chief Complaint: PDism    Interval Hx    Since last visit,     Increased from carbidopa/levodopa 25/100mg 1 tab PO TID to 1 tab four times a day  Tried up to 1.5 tabs four times a day  No major improvement seen - still struggling to walk    7/11/3/7pm    Intermittent imbalance  Has intermittent retropulsion per family  Some FOG  Falls once a month  Uses  A walker. Can ambulate 2 blocks    Dizziness occurred on 1.5 tabs  Balance has not changed and gets worse in the evenings  He is not on   No CHF noted    A1c's in 7's  Mild tremor at 5pm.  Imbalance also seems to be worst at 5PM      "PriorHPI: Jagdeep Greenberg is a R HANDED 79 y.o. male with a medical issues significant for PD, MCI, Lumbar spinal disease s/p surgery, CKD, CAD, Polymyalgia rheumatica, Prostate " "cancer, DMII, who presents for establishment of care, seen by Dr. Harden in the past. He notes 3 years ago he had pain in his joints and neuro consult weas ordered. He noticed generalized slowing of movements. Dr. Harden noted L sided lack of arm swing. He started furniture surfing for stability. He got a cane 6 months ago. At this point he can walk a half block before fatiguing. Since December 2019 he's had a decline in gait. He's fallen several times since. No FOG.  Tremor started 3 months ago. Tremor is minor and resting. Handwriting has declined. At times he's spilled food.    He likes ballroom dancing    Medication history:  He's been on carbidopa/levodopa 25/100mg 1 tab PO TID   Unclear ONtime"    PD Review of Symptoms:  Anosmia: 3 years  Dysarthria/Hypophonia: Mild  Dysphagia/Sialorrhea: Mild  Depression: started celexa  Cognitive slowing: memory decline notable- Last MOCA 17  Hallucinations: sees objects out iof the corner of his eyes  Impulsivity: none  Urinary changes: none  Constipation: none  Orthostasis: Occasional since 6 mos - had to cut BP medication  Erectile Dysfunction: since prostate cancer  Dyskinesia: none  Falls: as above  Freezing: none  Micrographia: yes  Sleep issues:  -RBD: talks    Review of Systems:   Review of Systems   Constitutional: Negative for fever.   HENT: Negative for congestion.    Eyes: Negative for double vision.   Respiratory: Negative for cough and shortness of breath.    Cardiovascular: Negative for chest pain and leg swelling.   Gastrointestinal: Negative for nausea.   Genitourinary: Negative for dysuria.   Musculoskeletal: Positive for falls.   Skin: Negative for rash.   Neurological: Positive for tremors and speech change. Negative for headaches.   Psychiatric/Behavioral: Negative for depression.         Current Medications:  Outpatient Encounter Medications as of 6/12/2020   Medication Sig Dispense Refill    aspirin (ECOTRIN) 81 MG EC tablet Take 1 tablet (81 mg total) " by mouth once daily.  0    blood sugar diagnostic (GLUCOSE BLOOD) Strp Test glucose 1 x daily 35 strip prn    carbidopa-levodopa  mg (SINEMET)  mg per tablet Take 1 tablet by mouth 3 (three) times daily. 270 tablet 3    citalopram (CELEXA) 20 MG tablet Take 1 tablet (20 mg total) by mouth once daily. 90 tablet 3    diclofenac sodium (VOLTAREN) 1 % Gel Apply 2 g topically 4 (four) times daily as needed. 100 g 5    donepeziL (ARICEPT) 5 MG tablet TAKE 1 TABLET(5 MG) BY MOUTH EVERY EVENING 90 tablet 3    fish oil-omega-3 fatty acids 300-1,000 mg capsule Take 2 g by mouth once daily.      glimepiride (AMARYL) 2 MG tablet TAKE 1 TABLET(2 MG) BY MOUTH DAILY WITH BREAKFAST 90 tablet 0    GLUC.METER,DIS.P-LOADED STRIPS (GLUCOSE METER, DISP & STRIPS) Kit Check glucose once per day 1 kit prn    GLUCOSAMINE HCL (GLUCOSAMINE, BULK, MISC) by Misc.(Non-Drug; Combo Route) route.      L. RHAMNOSUS GG/INULIN (CULTURELLE PROBIOTICS ORAL) Take by mouth.      lancets Misc As directed 100 each prn    metFORMIN (GLUCOPHAGE) 1000 MG tablet TAKE 1 TABLET(1000 MG) BY MOUTH EVERY DAY 90 tablet 3    pravastatin (PRAVACHOL) 20 MG tablet TAKE 1 TABLET(20 MG) BY MOUTH EVERY DAY 90 tablet 2    predniSONE (DELTASONE) 2.5 MG tablet Take 1 tablet (2.5 mg total) by mouth 2 (two) times daily. 180 tablet 3    turmeric root extract 500 mg Cap Take by mouth.       No facility-administered encounter medications on file as of 6/12/2020.        Past Medical History:  Patient Active Problem List   Diagnosis    Hypertension associated with diabetes    Combined hyperlipidemia associated with type 2 diabetes mellitus    Type 2 diabetes mellitus with diabetic neuropathy, without long-term current use of insulin    History of prostate cancer    H/O adenomatous polyp of colon    Carotid artery plaque    PMR (polymyalgia rheumatica)    History of colon polyps    Colon polyps    Diverticulosis of large intestine without  hemorrhage    Primary osteoarthritis involving multiple joints    Chronic kidney disease, stage 3    Type 2 diabetes mellitus with stage 3 chronic kidney disease    Parkinsonism    Chronic midline low back pain with bilateral sciatica    Chronic gluteal pain    Chronic left shoulder pain    Dupuytren's contracture of both hands    Primary osteoarthritis of left knee    Lacunar stroke    MCI (mild cognitive impairment)    Polyp of colon    Excessive drinking alcohol    Gait abnormality    Lumbar radiculopathy    Lumbar spondylosis    DDD (degenerative disc disease), lumbar    Spinal stenosis of lumbar region with neurogenic claudication    Chronic pain    Dizziness       Past Surgical History:  Past Surgical History:   Procedure Laterality Date    APPENDECTOMY      COLONOSCOPY  In 3/2/2010    Repeat 5 years    COLONOSCOPY N/A 9/29/2015    Procedure: COLONOSCOPY;  Surgeon: Erik Brandt MD;  Location: [facility];  Service: Endoscopy;  Laterality: N/A;    COLONOSCOPY N/A 10/11/2016    Procedure: COLONOSCOPY;  Surgeon: Erik Brandt MD;  Location: [facility];  Service: Endoscopy;  Laterality: N/A;    COLONOSCOPY N/A 10/24/2017    Procedure: COLONOSCOPY;  Surgeon: Erik Brandt MD;  Location: [facility];  Service: Endoscopy;  Laterality: N/A;    COLONOSCOPY N/A 8/23/2018    Procedure: COLONOSCOPY;  Surgeon: Nghia Cooper MD;  Location: [facility] ([address] [place] [address]);  Service: Endoscopy;  Laterality: N/A;    FRACTURE SURGERY      HERNIA REPAIR      INJECTION OF FACET JOINT Right 7/23/2019    Procedure: INJECTION, FACET JOINT--Right L3,4,5,S1;  Surgeon: Jax Nelson Jr., MD;  Location: [facility];  Service: Pain Management;  Laterality: Right;  Pt is diabectic    LYMPH NODE DISSECTION      Pelvic    PROSTATECTOMY      RADIOFREQUENCY THERMOCOAGULATION Right 2/25/2020    Procedure: Right L3-5 Lumbar RFA;  Surgeon: Pino Baltazar MD;  Location: [facility];  Service: Pain  Management;  Laterality: Right;    SHOULDER ARTHROSCOPY      Right       Current Living Situation: home    Social:  Social History     Socioeconomic History    Marital status:      Spouse name: Joaquin    Number of children: 0    Years of education: Not on file    Highest education level: Not on file   Occupational History    Occupation: Retired   Social Needs    Financial resource strain: Not on file    Food insecurity:     Worry: Not on file     Inability: Not on file    Transportation needs:     Medical: Not on file     Non-medical: Not on file   Tobacco Use    Smoking status: Never Smoker    Smokeless tobacco: Never Used   Substance and Sexual Activity    Alcohol use: Yes     Alcohol/week: 35.0 standard drinks     Types: 14 Cans of beer, 21 Shots of liquor per week     Comment: 3 drinks a day    Drug use: No    Sexual activity: Not on file   Lifestyle    Physical activity:     Days per week: Not on file     Minutes per session: Not on file    Stress: Not on file   Relationships    Social connections:     Talks on phone: Not on file     Gets together: Not on file     Attends Hinduism service: Not on file     Active member of club or organization: Not on file     Attends meetings of clubs or organizations: Not on file     Relationship status: Not on file   Other Topics Concern    Not on file   Social History Narrative    Not on file       Family History:  Family History   Problem Relation Age of Onset    Leukemia Father 68    Anesthesia problems Neg Hx        PHYSICAL:  There were no vitals taken for this visit.    Physical Exam  Constitutional: Well-developed, well-nourished, appears stated age  Eyes: No scleral icterus  ENT: Moist oral mucosa  Cardiovascular: No lower extremity edema   Respiratory: No labored breathing   Skin: No rash   Hematologic: No bruising    Other: GI/ deferred   · Mental status: Alert and oriented to person, place, time, and situation;   · follows  commands  · Speech: normal (not dysarthric), no aphasia  · Cranial nerves:            · CN II: Pupils mid-position and equal, not tested light or accommodation  · CN III, IV, VI: Extraocular movements full, no nystagmus visualized  · CN V: Not tested   · CN VII: Face strong and symmetric bilaterally   · CN VIII: Hearing intact to voice and conversation   · CN IX, X: Palate raises midline and symmetric   · CN XI: Strong shoulder shrug B/L  · CN XII: Tongue appears midline   · Motor: Normal bulk by appearance, no drift   · Sensory: Not tested    · Gait: Moderate shuffle  · Deep tendon reflexes: Not tested  · Movement/Coordination                    Mild hypophonic speech.                     Mild facial masking.                   No tremor with rest, posture, kinesis, or intention.                     No other dystonia, chorea, athetosis, myoclonus, or tics visualized.    Bradykinesia  ? Finger taps Finger flicks BRAULIO Heel taps   Left 3+ 1+ - -   Right 2+ - - -                 Assessment//Plan:   Problem List Items Addressed This Visit        Neuro    Parkinsonism - Primary    Current Assessment & Plan     Typical iPD. He appears under-dosed, and gait is affected most. s he could not tolerate 1.5 tabs carbidopa/levodopa 25/100mg, I suspect orthostasis. See Orhtostasis.  Suggest PT             Relevant Orders    Ambulatory referral/consult to Physical/Occupational Therapy    MCI (mild cognitive impairment)    Current Assessment & Plan     Possible PDD. Reversible workup neg. Aricept 5mg QHS.            Other    Dizziness    Current Assessment & Plan     Dizziness on standing likely from sinemet-induced orthostasis  Suggested BP log to screen, and may try midodrine               Discussed the importance of ongoing exercise in efforts to improve mobility and balance.  Suggested a diet high in antioxidants such as berries and green tea.    Courtney Malik MD, MS  [facility]  Department of Neurology  Movement  Disorders

## 2020-07-30 ENCOUNTER — OFFICE VISIT (OUTPATIENT)
Dept: HOME HEALTH SERVICES | Facility: CLINIC | Age: 80
End: 2020-07-30
Payer: MEDICARE

## 2020-07-30 ENCOUNTER — TELEPHONE (OUTPATIENT)
Dept: HOME HEALTH SERVICES | Facility: CLINIC | Age: 80
End: 2020-07-30

## 2020-07-30 VITALS
BODY MASS INDEX: 20.27 KG/M2 | OXYGEN SATURATION: 98 % | SYSTOLIC BLOOD PRESSURE: 111 MMHG | HEIGHT: 75 IN | DIASTOLIC BLOOD PRESSURE: 66 MMHG | WEIGHT: 163 LBS | TEMPERATURE: 98 F | HEART RATE: 71 BPM

## 2020-07-30 DIAGNOSIS — M54.16 LUMBAR RADICULOPATHY: ICD-10-CM

## 2020-07-30 DIAGNOSIS — M47.816 LUMBAR SPONDYLOSIS: ICD-10-CM

## 2020-07-30 DIAGNOSIS — G31.84 MCI (MILD COGNITIVE IMPAIRMENT): ICD-10-CM

## 2020-07-30 DIAGNOSIS — E11.69 COMBINED HYPERLIPIDEMIA ASSOCIATED WITH TYPE 2 DIABETES MELLITUS: ICD-10-CM

## 2020-07-30 DIAGNOSIS — F33.9 MAJOR DEPRESSION, RECURRENT, CHRONIC: ICD-10-CM

## 2020-07-30 DIAGNOSIS — R26.9 GAIT ABNORMALITY: ICD-10-CM

## 2020-07-30 DIAGNOSIS — G20.C PRIMARY PARKINSONISM: ICD-10-CM

## 2020-07-30 DIAGNOSIS — I15.2 HYPERTENSION ASSOCIATED WITH DIABETES: ICD-10-CM

## 2020-07-30 DIAGNOSIS — N18.30 TYPE 2 DIABETES MELLITUS WITH STAGE 3 CHRONIC KIDNEY DISEASE, WITHOUT LONG-TERM CURRENT USE OF INSULIN: ICD-10-CM

## 2020-07-30 DIAGNOSIS — G20.C PRIMARY PARKINSONISM: Primary | ICD-10-CM

## 2020-07-30 DIAGNOSIS — Z87.81 HISTORY OF COMPRESSION FRACTURE OF SPINE: ICD-10-CM

## 2020-07-30 DIAGNOSIS — E11.40 TYPE 2 DIABETES MELLITUS WITH DIABETIC NEUROPATHY, WITHOUT LONG-TERM CURRENT USE OF INSULIN: ICD-10-CM

## 2020-07-30 DIAGNOSIS — E11.59 HYPERTENSION ASSOCIATED WITH DIABETES: ICD-10-CM

## 2020-07-30 DIAGNOSIS — G89.4 CHRONIC PAIN SYNDROME: ICD-10-CM

## 2020-07-30 DIAGNOSIS — M51.36 DDD (DEGENERATIVE DISC DISEASE), LUMBAR: ICD-10-CM

## 2020-07-30 DIAGNOSIS — Z91.81 HISTORY OF FALLING: ICD-10-CM

## 2020-07-30 DIAGNOSIS — M35.3 PMR (POLYMYALGIA RHEUMATICA): ICD-10-CM

## 2020-07-30 DIAGNOSIS — M15.9 PRIMARY OSTEOARTHRITIS INVOLVING MULTIPLE JOINTS: ICD-10-CM

## 2020-07-30 DIAGNOSIS — E11.22 TYPE 2 DIABETES MELLITUS WITH STAGE 3 CHRONIC KIDNEY DISEASE, WITHOUT LONG-TERM CURRENT USE OF INSULIN: ICD-10-CM

## 2020-07-30 DIAGNOSIS — Z86.73 HISTORY OF STROKE: ICD-10-CM

## 2020-07-30 DIAGNOSIS — E78.2 COMBINED HYPERLIPIDEMIA ASSOCIATED WITH TYPE 2 DIABETES MELLITUS: ICD-10-CM

## 2020-07-30 DIAGNOSIS — I25.10 CORONARY ARTERY DISEASE INVOLVING NATIVE CORONARY ARTERY OF NATIVE HEART WITHOUT ANGINA PECTORIS: ICD-10-CM

## 2020-07-30 DIAGNOSIS — Z85.46 HISTORY OF PROSTATE CANCER: ICD-10-CM

## 2020-07-30 DIAGNOSIS — Z00.00 ENCOUNTER FOR PREVENTIVE HEALTH EXAMINATION: Primary | ICD-10-CM

## 2020-07-30 DIAGNOSIS — R26.89 BALANCE PROBLEM: ICD-10-CM

## 2020-07-30 DIAGNOSIS — M48.062 SPINAL STENOSIS OF LUMBAR REGION WITH NEUROGENIC CLAUDICATION: ICD-10-CM

## 2020-07-30 PROBLEM — S22.39XA CLOSED FRACTURE OF ONE RIB: Status: ACTIVE | Noted: 2020-06-18

## 2020-07-30 PROBLEM — S22.000A COMPRESSION FRACTURE OF BODY OF THORACIC VERTEBRA: Status: ACTIVE | Noted: 2020-06-18

## 2020-07-30 PROBLEM — R29.6 FREQUENT FALLS: Status: ACTIVE | Noted: 2020-06-18

## 2020-07-30 PROCEDURE — 99499 UNLISTED E&M SERVICE: CPT | Mod: S$GLB,,, | Performed by: NURSE PRACTITIONER

## 2020-07-30 PROCEDURE — 3078F DIAST BP <80 MM HG: CPT | Mod: CPTII,S$GLB,, | Performed by: NURSE PRACTITIONER

## 2020-07-30 PROCEDURE — 3078F PR MOST RECENT DIASTOLIC BLOOD PRESSURE < 80 MM HG: ICD-10-PCS | Mod: CPTII,S$GLB,, | Performed by: NURSE PRACTITIONER

## 2020-07-30 PROCEDURE — 99499 RISK ADDL DX/OHS AUDIT: ICD-10-PCS | Mod: S$GLB,,, | Performed by: NURSE PRACTITIONER

## 2020-07-30 PROCEDURE — 3074F SYST BP LT 130 MM HG: CPT | Mod: CPTII,S$GLB,, | Performed by: NURSE PRACTITIONER

## 2020-07-30 PROCEDURE — 3074F PR MOST RECENT SYSTOLIC BLOOD PRESSURE < 130 MM HG: ICD-10-PCS | Mod: CPTII,S$GLB,, | Performed by: NURSE PRACTITIONER

## 2020-07-30 PROCEDURE — G0439 PR MEDICARE ANNUAL WELLNESS SUBSEQUENT VISIT: ICD-10-PCS | Mod: S$GLB,,, | Performed by: NURSE PRACTITIONER

## 2020-07-30 PROCEDURE — 3051F HG A1C>EQUAL 7.0%<8.0%: CPT | Mod: CPTII,S$GLB,, | Performed by: NURSE PRACTITIONER

## 2020-07-30 PROCEDURE — 3051F PR MOST RECENT HEMOGLOBIN A1C LEVEL 7.0 - < 8.0%: ICD-10-PCS | Mod: CPTII,S$GLB,, | Performed by: NURSE PRACTITIONER

## 2020-07-30 PROCEDURE — G0439 PPPS, SUBSEQ VISIT: HCPCS | Mod: S$GLB,,, | Performed by: NURSE PRACTITIONER

## 2020-07-30 RX ORDER — TRIAMCINOLONE ACETONIDE 1 MG/G
CREAM TOPICAL 2 TIMES DAILY PRN
Qty: 80 G | Refills: 0 | Status: SHIPPED | OUTPATIENT
Start: 2020-07-30 | End: 2020-10-07 | Stop reason: SDUPTHER

## 2020-07-30 RX ORDER — PREDNISONE 2.5 MG/1
2.5 TABLET ORAL DAILY
COMMUNITY
End: 2020-10-13 | Stop reason: SDUPTHER

## 2020-07-30 RX ORDER — CARBIDOPA AND LEVODOPA 25; 100 MG/1; MG/1
1.5 TABLET ORAL 4 TIMES DAILY
COMMUNITY
End: 2020-09-04

## 2020-07-30 NOTE — TELEPHONE ENCOUNTER
Not sure that I can tell what this is from the photograph.  We could have him try triamcinolone 0.1 % cream applied twice a day as needed.  Please make sure that we have updated pharmacy information and we can send prescription.  Will route this to my nursing staff.  Thanks  Home health orders signed

## 2020-07-30 NOTE — TELEPHONE ENCOUNTER
Dr Contreras- Patient complains of this rash that he feels reoccurred for past 3 months. He has itching with this somewhat.   He said this is rash that he has had in the past.  Can you prescribe something for him?    I also have pended [facility] referral to [facility]- they will be cancelling the other one today .  If you sign order - we can get [facility] back out.    Meghan

## 2020-07-31 ENCOUNTER — TELEPHONE (OUTPATIENT)
Dept: FAMILY MEDICINE | Facility: CLINIC | Age: 80
End: 2020-07-31

## 2020-07-31 RX ORDER — DULOXETIN HYDROCHLORIDE 30 MG/1
30 CAPSULE, DELAYED RELEASE ORAL DAILY
Qty: 30 CAPSULE | Refills: 1 | Status: SHIPPED | OUTPATIENT
Start: 2020-07-31 | End: 2020-08-06

## 2020-07-31 NOTE — TELEPHONE ENCOUNTER
----- Message from Conchis Valerio sent at 7/31/2020  8:24 AM CDT -----  Contact: gio from [facility]  Is calling to let the office know that they will do the eval for occupational therapy on Monday and if tht's ok. The Phys therapist and nurse will be going today and can be reached at [phone] ext [phone]//thanks/dbw

## 2020-07-31 NOTE — TELEPHONE ENCOUNTER
----- Message from Silke Guevara sent at 7/31/2020 10:27 AM CDT -----  Type:  Needs Medical Advice    Who Called:   Farzana with [facility]   Symptoms (please be specific):   Pt is requesting to be admitted for Home Health tomorrow//8/1/20   How long has patient had these symptoms:    Pharmacy name and phone #:    Would the patient rather a call back or a response via [facility]?  Call back  Best Call Back Number:  [phone]  Additional Information:  Please call/thanks/

## 2020-07-31 NOTE — TELEPHONE ENCOUNTER
Ok on home health.  Spoke with spouse.  Patient has had declining health for a while related to medical issues primarily Parkinson's.  Patient with some depression.  He has some concerns about what would happen to him if she were to to become ill and not be able to take care of him.  Apparently had intimated that he would shoot himself if that she passed and he had to go into a nursing home.  Spouse has since sold off all guns that they possessed.  He has been compliant with his Celexa.  I recommended discontinuing the Celexa.  We will start him on duloxetine 30 mg daily.  They will let me know how he is doing in the next 2 weeks with this and we could increase dosage if needed.  This may help somewhat with his chronic back pains as well.

## 2020-08-01 PROCEDURE — G0180 PR HOME HEALTH MD CERTIFICATION: ICD-10-PCS | Mod: ,,, | Performed by: FAMILY MEDICINE

## 2020-08-01 PROCEDURE — G0180 MD CERTIFICATION HHA PATIENT: HCPCS | Mod: ,,, | Performed by: FAMILY MEDICINE

## 2020-08-02 PROBLEM — I63.81 LACUNAR STROKE: Status: RESOLVED | Noted: 2017-08-17 | Resolved: 2020-08-02

## 2020-08-02 PROBLEM — S22.000A COMPRESSION FRACTURE OF BODY OF THORACIC VERTEBRA: Status: RESOLVED | Noted: 2020-06-18 | Resolved: 2020-08-02

## 2020-08-02 PROBLEM — I25.10 CORONARY ARTERY DISEASE INVOLVING NATIVE CORONARY ARTERY OF NATIVE HEART WITHOUT ANGINA PECTORIS: Status: ACTIVE | Noted: 2020-08-02

## 2020-08-02 PROBLEM — Z87.81 HISTORY OF COMPRESSION FRACTURE OF SPINE: Status: ACTIVE | Noted: 2020-08-02

## 2020-08-02 PROBLEM — Z86.73 HISTORY OF STROKE: Status: ACTIVE | Noted: 2020-08-02

## 2020-08-02 PROBLEM — K63.5 POLYP OF COLON: Status: RESOLVED | Noted: 2018-01-09 | Resolved: 2020-08-02

## 2020-08-02 PROBLEM — S22.39XA CLOSED FRACTURE OF ONE RIB: Status: RESOLVED | Noted: 2020-06-18 | Resolved: 2020-08-02

## 2020-08-02 PROBLEM — F33.9 MAJOR DEPRESSION, RECURRENT, CHRONIC: Status: ACTIVE | Noted: 2020-08-02

## 2020-08-02 NOTE — PROGRESS NOTES
"  Jagdeep Greenberg presented for a  Medicare AWV and comprehensive Health Risk Assessment today. The following components were reviewed and updated:    · Medical history  · Family History  · Social history  · Allergies and Current Medications  · Health Risk Assessment  · Health Maintenance  · Care Team     ** See Completed Assessments for Annual Wellness Visit within the encounter summary.**         The following assessments were completed:  · Living Situation  · CAGE  · Depression Screening  · Timed Get Up and Go  · Whisper Test  · Cognitive Function Screening  ·   ·   ·   · Nutrition Screening  · ADL Screening  · PAQ Screening        Vitals:    07/30/20 1049   BP: 111/66   BP Location: Left arm   Patient Position: Sitting   Pulse: 71   Temp: 97.5 °F (36.4 °C)   SpO2: 98%   Weight: 73.9 kg (163 lb)   Height: 6' 3" (1.905 m)     Body mass index is 20.37 kg/m².  Physical Exam  Constitutional:       Appearance: Normal appearance.   HENT:      Head: Normocephalic and atraumatic.      Nose: Nose normal.   Eyes:      Extraocular Movements: Extraocular movements intact.      Pupils: Pupils are equal, round, and reactive to light.   Cardiovascular:      Rate and Rhythm: Normal rate and regular rhythm.      Pulses: Normal pulses.      Heart sounds: Normal heart sounds.   Pulmonary:      Effort: Pulmonary effort is normal.      Breath sounds: Normal breath sounds.   Neurological:      Mental Status: He is alert.               Diagnoses and health risks identified today and associated recommendations/orders:    1. Encounter for preventive health examination  AWV completed     2. History of falling  Chronic and stable. Continue current treatment. Follow with PCP.        3. History of compression fracture of spine  Chronic and stable. Continue current treatment. Follow with PCP.    4. Combined hyperlipidemia associated with type 2 diabetes mellitus  Chronic and stable. Continue current treatment. Follow with PCP.      5. " Hypertension associated with diabetes  Chronic and stable. Continue current treatment. Follow with PCP.    6. Type 2 diabetes mellitus with diabetic neuropathy, without long-term current use of insulin  Chronic and stable. Continue current treatment. Follow with PCP.      7. Type 2 diabetes mellitus with stage 3 chronic kidney disease, without long-term current use of insulin  Chronic and stable. Continue current treatment. Follow with PCP.  Followed by PCP     8. History of prostate cancer  Chronic and stable. Continue current treatment. Follow with PCP.    9. PMR (polymyalgia rheumatica)  Chronic and stable. Continue current treatment. Follow with PCP.  Tapering steroid dose-  Stable on 2.5mg prednisone     10. Primary osteoarthritis involving multiple joints  Chronic and stable. Continue current treatment. Follow with PCP.      11. Gait abnormality  Chronic and stable. Continue current treatment. Follow with PCP.  Using walker - able to get around in house     12. DDD (degenerative disc disease), lumbar  Chronic and stable. Continue current treatment. Follow with PCP.    13. Chronic pain syndrome  Chronic and stable. Continue current treatment. Follow with PCP.  stable    14. History of stroke  Chronic and stable. Continue current treatment. Follow with PCP.      15. Primary Parkinsonism  Chronic and stable. Continue current treatment. Follow with PCP and neurology     16. MCI (mild cognitive impairment)  Chronic and stable. Continue current treatment. Follow with PCP.      17. Major depression, recurrent, chronic  Unstable - reported changes to PCP to address medication change   Wife aware of depression and will monitor any acute changes   Report any issues to PCP immediately or call 911     18. Coronary artery disease involving native coronary artery of native heart without angina pectoris  Chronic and stable. Continue current treatment. Follow with PCP.      19. Lumbar radiculopathy  Chronic and stable.  Continue current treatment. Follow with PCP.      20. Spinal stenosis of lumbar region with neurogenic claudication  Chronic and stable. Continue current treatment. Follow with PCP.        Provided Jagdeep with a 5-10 year written screening schedule and personal prevention plan. Recommendations were developed using the USPSTF age appropriate recommendations. Education, counseling, and referrals were provided as needed. After Visit Summary printed and given to patient which includes a list of additional screenings\tests needed.    Fu in 1 yr for KAMRAN Wilcox NP  I offered to discuss end of life issues, including information on how to make advance directives that the patient could use to name someone who would make medical decisions on their behalf if they became too ill to make themselves.    ___Patient declined  _X_Patient is interested, I provided paper work and offered to discuss.

## 2020-08-02 NOTE — PATIENT INSTRUCTIONS
Counseling and Referral of Other Preventative  (Italic type indicates deductible and co-insurance are waived)    Patient Name: Jagdeep Greenberg  Today's Date: 8/2/2020    Health Maintenance       Date Due Completion Date    Hepatitis C Screening 1940 ---    Shingles Vaccine (2 of 3) 12/16/2013 10/21/2013    Eye Exam 03/08/2020 3/8/2019    Override on 8/25/2014: Done (Per Dr. Pace)    Override on 9/17/2012: Done (per patient)    Hemoglobin A1c 08/06/2020 2/6/2020    Influenza Vaccine (1) 09/01/2020 8/30/2019    Lipid Panel 11/22/2020 11/22/2019    Urine Microalbumin 11/22/2020 11/22/2019    Foot Exam 02/06/2021 2/6/2020 (Done)    Override on 2/6/2020: Done    Override on 2/20/2019: Done    Override on 10/9/2017: Done    Override on 11/7/2016: Done    Aspirin/Antiplatelet Therapy 03/12/2021 3/12/2020    Colonoscopy 08/23/2021 8/23/2018    Override on 3/2/2010: Done    TETANUS VACCINE 08/30/2029 8/30/2019        No orders of the defined types were placed in this encounter.    The following information is provided to all patients.  This information is to help you find resources for any of the problems found today that may be affecting your health:                Living healthy guide: www.[facility].[place].gov      Understanding Diabetes: www.diabetes.org      Eating healthy: www.cdc.gov/healthyweight      CDC home safety checklist: www.cdc.gov/steadi/patient.html      Agency on Aging: www.goea.[place].[facility]      Alcoholics anonymous (AA): www.aa.org      Physical Activity: www.niles.nih.gov/bp8oabt      Tobacco use: www.quitwithusla.org

## 2020-08-06 ENCOUNTER — TELEPHONE (OUTPATIENT)
Dept: FAMILY MEDICINE | Facility: CLINIC | Age: 80
End: 2020-08-06

## 2020-08-06 RX ORDER — DULOXETIN HYDROCHLORIDE 30 MG/1
60 CAPSULE, DELAYED RELEASE ORAL DAILY
Qty: 30 CAPSULE | Refills: 1 | COMMUNITY
Start: 2020-08-06 | End: 2020-08-18 | Stop reason: DRUGHIGH

## 2020-08-06 RX ORDER — GLIMEPIRIDE 2 MG/1
TABLET ORAL
Qty: 90 TABLET | Refills: 1 | Status: SHIPPED | OUTPATIENT
Start: 2020-08-06 | End: 2020-08-13

## 2020-08-06 NOTE — TELEPHONE ENCOUNTER
Left message on voice mail to return call, for Farzana,  with [facility], at [phone] about lab draw.

## 2020-08-06 NOTE — TELEPHONE ENCOUNTER
Wife sent a [employer] message to reschedule lab and mentioned the following  His blood pressure runs 136/78 sitting and 108/56 standing. He gets dizzy when he stands. His blood sugar yesterday was 111 and 103.

## 2020-08-06 NOTE — TELEPHONE ENCOUNTER
Farzana will have blood draw done at nurse visit and bring to [facility] to have it run.  Myriam Joaquin, spouse, informed

## 2020-08-06 NOTE — TELEPHONE ENCOUNTER
Spoke with spouse.    Please schedule the lab to be done at the [facility].  If not able to be done at the [facility] then [facility].  The duloxetine seems to be helping mentally significantly per spouse.  He apparently is not getting any glucose readings below 100.  Advised to be on the lookout and definitely let us know if any glucose readings below 80.  He is having orthostasis with his Parkinson's and Parkinson's medication.  They will try to increase fluids, try to increase salt intake.  Will increase his duloxetine starting this weekend up to 60 mg a day to see if this will help boost the blood pressure somewhat as well.  Continue monitoring blood pressure sitting and standing as he has been doing and let us know what these are.  If not improving in the next 2 week then we would need to consider adding additional medication to treat his orthostasis.

## 2020-08-06 NOTE — TELEPHONE ENCOUNTER
[facility] would probably be better.  Also I think he is on home health.  We could probably have the home health go out to his house and draw the lab to bring to the [facility] lab

## 2020-08-11 ENCOUNTER — DOCUMENT SCAN (OUTPATIENT)
Dept: HOME HEALTH SERVICES | Facility: HOSPITAL | Age: 80
End: 2020-08-11
Payer: MEDICARE

## 2020-08-11 ENCOUNTER — EXTERNAL HOME HEALTH (OUTPATIENT)
Dept: HOME HEALTH SERVICES | Facility: HOSPITAL | Age: 80
End: 2020-08-11
Payer: MEDICARE

## 2020-08-13 ENCOUNTER — TELEPHONE (OUTPATIENT)
Dept: FAMILY MEDICINE | Facility: CLINIC | Age: 80
End: 2020-08-13

## 2020-08-13 DIAGNOSIS — E11.40 TYPE 2 DIABETES MELLITUS WITH DIABETIC NEUROPATHY, WITHOUT LONG-TERM CURRENT USE OF INSULIN: Primary | ICD-10-CM

## 2020-08-13 RX ORDER — GLIMEPIRIDE 1 MG/1
1 TABLET ORAL
Qty: 90 TABLET | Refills: 1 | Status: SHIPPED | OUTPATIENT
Start: 2020-08-13 | End: 2020-11-12

## 2020-08-13 NOTE — TELEPHONE ENCOUNTER
Patient with some issues with his toes.  Diabetic.  Wants to see podiatry.  Referral placed.  Please schedule for Collin or Aleks

## 2020-08-18 RX ORDER — DULOXETIN HYDROCHLORIDE 60 MG/1
60 CAPSULE, DELAYED RELEASE ORAL DAILY
Qty: 90 CAPSULE | Refills: 3 | Status: SHIPPED | OUTPATIENT
Start: 2020-08-18 | End: 2021-08-18

## 2020-08-18 NOTE — TELEPHONE ENCOUNTER
----- Message from Nicole Bellamy sent at 8/18/2020  4:00 PM CDT -----  Regarding: NEED A NEW RX FOR DOSE INCREASE CHANGE OF CYMBALTA FOR PATIENT  Contact: [facility]  PLEASE CALL ASAF @ [phone]. THANKS    [facility] #[id number] [facility] [address] [place] [address]  [address] [facility] [address]  Phone: [phone] Fax: [phone]

## 2020-08-19 ENCOUNTER — PATIENT OUTREACH (OUTPATIENT)
Dept: ADMINISTRATIVE | Facility: OTHER | Age: 80
End: 2020-08-19

## 2020-08-19 NOTE — PROGRESS NOTES
Health Maintenance Due   Topic Date Due    Hepatitis C Screening  1940    Shingles Vaccine (2 of 3) 12/16/2013    Eye Exam  03/08/2020     Updates were requested from care everywhere.  Chart was reviewed for overdue Proactive [facility] Encounters (NICOLE) topics (CRS, Breast Cancer Screening, Eye exam)  Health Maintenance has been updated.  LINKS immunization registry triggered.  Immunizations were reconciled.

## 2020-08-21 ENCOUNTER — OFFICE VISIT (OUTPATIENT)
Dept: PODIATRY | Facility: CLINIC | Age: 80
End: 2020-08-21
Payer: MEDICARE

## 2020-08-21 VITALS — WEIGHT: 162.94 LBS | BODY MASS INDEX: 20.26 KG/M2 | HEIGHT: 75 IN

## 2020-08-21 DIAGNOSIS — M20.41 HAMMER TOES OF BOTH FEET: ICD-10-CM

## 2020-08-21 DIAGNOSIS — B35.1 ONYCHOMYCOSIS DUE TO DERMATOPHYTE: ICD-10-CM

## 2020-08-21 DIAGNOSIS — M20.42 HAMMER TOES OF BOTH FEET: ICD-10-CM

## 2020-08-21 DIAGNOSIS — S90.415A ABRASION OF TOE OF LEFT FOOT, INITIAL ENCOUNTER: Primary | ICD-10-CM

## 2020-08-21 DIAGNOSIS — S90.414A ABRASION OF TOE OF RIGHT FOOT, INITIAL ENCOUNTER: ICD-10-CM

## 2020-08-21 DIAGNOSIS — E11.40 TYPE 2 DIABETES MELLITUS WITH DIABETIC NEUROPATHY, WITHOUT LONG-TERM CURRENT USE OF INSULIN: ICD-10-CM

## 2020-08-21 PROCEDURE — 1101F PR PT FALLS ASSESS DOC 0-1 FALLS W/OUT INJ PAST YR: ICD-10-PCS | Mod: HCNC,CPTII,S$GLB, | Performed by: PODIATRIST

## 2020-08-21 PROCEDURE — 1159F MED LIST DOCD IN RCRD: CPT | Mod: HCNC,S$GLB,, | Performed by: PODIATRIST

## 2020-08-21 PROCEDURE — 11721 PR DEBRIDEMENT OF NAILS, 6 OR MORE: ICD-10-PCS | Mod: Q9,HCNC,S$GLB, | Performed by: PODIATRIST

## 2020-08-21 PROCEDURE — 99999 PR PBB SHADOW E&M-EST. PATIENT-LVL III: ICD-10-PCS | Mod: PBBFAC,HCNC,, | Performed by: PODIATRIST

## 2020-08-21 PROCEDURE — 1159F PR MEDICATION LIST DOCUMENTED IN MEDICAL RECORD: ICD-10-PCS | Mod: HCNC,S$GLB,, | Performed by: PODIATRIST

## 2020-08-21 PROCEDURE — 1126F PR PAIN SEVERITY QUANTIFIED, NO PAIN PRESENT: ICD-10-PCS | Mod: HCNC,S$GLB,, | Performed by: PODIATRIST

## 2020-08-21 PROCEDURE — 99203 PR OFFICE/OUTPT VISIT, NEW, LEVL III, 30-44 MIN: ICD-10-PCS | Mod: 25,HCNC,S$GLB, | Performed by: PODIATRIST

## 2020-08-21 PROCEDURE — 1126F AMNT PAIN NOTED NONE PRSNT: CPT | Mod: HCNC,S$GLB,, | Performed by: PODIATRIST

## 2020-08-21 PROCEDURE — 99999 PR PBB SHADOW E&M-EST. PATIENT-LVL III: CPT | Mod: PBBFAC,HCNC,, | Performed by: PODIATRIST

## 2020-08-21 PROCEDURE — 11721 DEBRIDE NAIL 6 OR MORE: CPT | Mod: Q9,HCNC,S$GLB, | Performed by: PODIATRIST

## 2020-08-21 PROCEDURE — 99203 OFFICE O/P NEW LOW 30 MIN: CPT | Mod: 25,HCNC,S$GLB, | Performed by: PODIATRIST

## 2020-08-21 PROCEDURE — 1101F PT FALLS ASSESS-DOCD LE1/YR: CPT | Mod: HCNC,CPTII,S$GLB, | Performed by: PODIATRIST

## 2020-08-21 NOTE — LETTER
August 22, 2020      Panda Contreras MD  [address]  [place] [address]           [facility]  [address] [place] [address]  Phone: [phone]          Patient: Jagdeep Greenberg   MR Number: [id number]   YOB: 1940   Date of Visit: 8/21/2020       Dear Dr. Panda Contreras:    Thank you for referring Jagdeep rGeenberg to me for evaluation. Attached you will find relevant portions of my assessment and plan of care.    If you have questions, please do not hesitate to call me. I look forward to following Jagdeep Greenberg along with you.    Sincerely,    Ismael Melton DPM    Enclosure  CC:  No Recipients    If you would like to receive this communication electronically, please contact [email or web address] or [phone] to request more information on [facility] access.    For providers and/or their staff who would like to refer a patient to [facility], please contact us through our one-stop-shop provider referral line, Stephen Ball, at [phone].    If you feel you have received this communication in error or would no longer like to receive these types of communications, please e-mail [email or web address]

## 2020-08-22 NOTE — PROGRESS NOTES
Subjective:      Patient ID: Jagdeep Greenberg is a 79 y.o. male.    Chief Complaint: Diabetes Mellitus, Diabetic Foot Exam (some numbness), and Wound Care (small wounds on left 2nd toe and rt  2nd toe)    Jagdeep is a 79 y.o. male who presents to the clinic upon referral from Dr. Contreras  for evaluation and treatment of diabetic feet. Jagdeep has a past medical history of Adenomatous polyp of colon, Arthritis, Back pain, Carotid artery plaque, Chronic kidney disease, stage 3, Closed fracture of one rib (6/18/2020), Compression fracture of body of thoracic vertebra (6/18/2020), Coronary artery disease involving native coronary artery of native heart without angina pectoris (8/2/2020), Diabetes mellitus, type 2, Diabetes with neurologic complications, Erectile dysfunction following radical prostatectomy, Fractures, Hyperlipidemia LDL goal < 100, Hypertension goal BP (blood pressure) < 130/80, Lacunar stroke (8/17/2017), Lacunar stroke (8/17/2017), Major depression, recurrent, chronic (8/2/2020), MCI (mild cognitive impairment), Nonproliferative diabetic retinopathy of both eyes (2019), Parkinson disease, Parkinsonism (4/11/2017), Prostate cancer (2001), Renal manifestation of secondary diabetes mellitus, and Stroke. Patient relates no major problem with his feet. Only complaints today consist of small abrasions to the dorsum of bilateral 2nd toe.  Notes this was likely due to prior shoe gear.  Relates switching to a pair of Teva sandals with improvement of said issue.  Denies these areas being a source of pain secondary to his neuropathy.  Relates difficulties with gait on account of parkinson's disease and likely due to neuropathy as well.  Has been receiving home Physical therapy and occupational therapy with some improvement noted.  Relates excellent care over his blood glucose.  Denies any additional pedal complaints.      PCP: Panda Contreras MD    Date Last Seen by PCP: 7/20    Hemoglobin A1C   Date Value Ref  Range Status   08/13/2020 5.6 0.0 - 5.6 % Final     Comment:     Reference Interval:  5.0 - 5.6 Normal   5.7 - 6.4 High Risk   > 6.5 Diabetic    Hgb A1c results are standardized based on the (NGSP) National   Glycohemoglobin Standardization Program.    Hemoglobin A1C levels are related to mean serum/plasma glucose   during the preceding 2-3 months.        02/06/2020 7.2 (H) 4.0 - 5.6 % Final     Comment:     ADA Screening Guidelines:  5.7-6.4%  Consistent with prediabetes  >or=6.5%  Consistent with diabetes  High levels of fetal hemoglobin interfere with the HbA1C  assay. Heterozygous hemoglobin variants (HbS, HgC, etc)do  not significantly interfere with this assay.   However, presence of multiple variants may affect accuracy.     11/22/2019 7.7 (H) 4.0 - 5.6 % Final     Comment:     ADA Screening Guidelines:  5.7-6.4%  Consistent with prediabetes  >or=6.5%  Consistent with diabetes  High levels of fetal hemoglobin interfere with the HbA1C  assay. Heterozygous hemoglobin variants (HbS, HgC, etc)do  not significantly interfere with this assay.   However, presence of multiple variants may affect accuracy.             Past Medical History:   Diagnosis Date    Adenomatous polyp of colon     Arthritis     Back pain     Carotid artery plaque     Less than 50%    Chronic kidney disease, stage 3     Closed fracture of one rib 6/18/2020    Compression fracture of body of thoracic vertebra 6/18/2020    Coronary artery disease involving native coronary artery of native heart without angina pectoris 8/2/2020    Diabetes mellitus, type 2     Diabetes with neurologic complications     Erectile dysfunction following radical prostatectomy     Fractures     Hyperlipidemia LDL goal < 100     Hypertension goal BP (blood pressure) < 130/80     Lacunar stroke 8/17/2017    Lacunar stroke 8/17/2017    Major depression, recurrent, chronic 8/2/2020    MCI (mild cognitive impairment)     Nonproliferative diabetic  retinopathy of both eyes 2019    Parkinson disease     Parkinsonism 4/11/2017    Prostate cancer 2001    Renal manifestation of secondary diabetes mellitus     Stroke        Past Surgical History:   Procedure Laterality Date    APPENDECTOMY      COLONOSCOPY  In 3/2/2010    Repeat 5 years    COLONOSCOPY N/A 9/29/2015    Procedure: COLONOSCOPY;  Surgeon: Erik Brandt MD;  Location: [facility];  Service: Endoscopy;  Laterality: N/A;    COLONOSCOPY N/A 10/11/2016    Procedure: COLONOSCOPY;  Surgeon: Erik Brandt MD;  Location: [facility];  Service: Endoscopy;  Laterality: N/A;    COLONOSCOPY N/A 10/24/2017    Procedure: COLONOSCOPY;  Surgeon: Erik Brandt MD;  Location: [facility];  Service: Endoscopy;  Laterality: N/A;    COLONOSCOPY N/A 8/23/2018    Procedure: COLONOSCOPY;  Surgeon: Nghia Cooper MD;  Location: [facility] ([address] [place] [address]);  Service: Endoscopy;  Laterality: N/A;    FRACTURE SURGERY      HERNIA REPAIR      INJECTION OF FACET JOINT Right 7/23/2019    Procedure: INJECTION, FACET JOINT--Right L3,4,5,S1;  Surgeon: Jax Nelson Jr., MD;  Location: [facility];  Service: Pain Management;  Laterality: Right;  Pt is diabectic    LYMPH NODE DISSECTION      Pelvic    PROSTATECTOMY      RADIOFREQUENCY THERMOCOAGULATION Right 2/25/2020    Procedure: Right L3-5 Lumbar RFA;  Surgeon: Pino Baltazar MD;  Location: [facility];  Service: Pain Management;  Laterality: Right;    SHOULDER ARTHROSCOPY      Right       Family History   Problem Relation Age of Onset    Leukemia Father 68    Anesthesia problems Neg Hx        Social History     Socioeconomic History    Marital status:      Spouse name: Joaquin    Number of children: 0    Years of education: Not on file    Highest education level: Not on file   Occupational History    Occupation: Retired   Social Needs    Financial resource strain: Not on file    Food insecurity     Worry: Not on file     Inability: Not on file     Transportation needs     Medical: Not on file     Non-medical: Not on file   Tobacco Use    Smoking status: Never Smoker    Smokeless tobacco: Never Used   Substance and Sexual Activity    Alcohol use: Yes     Alcohol/week: 35.0 standard drinks     Types: 14 Cans of beer, 21 Shots of liquor per week     Comment: 3 drinks a day    Drug use: No    Sexual activity: Not on file   Lifestyle    Physical activity     Days per week: Not on file     Minutes per session: Not on file    Stress: Not on file   Relationships    Social connections     Talks on phone: Not on file     Gets together: Not on file     Attends [employer] service: Not on file     Active member of club or organization: Not on file     Attends meetings of clubs or organizations: Not on file     Relationship status: Not on file   Other Topics Concern    Not on file   Social History Narrative    Not on file       Current Outpatient Medications   Medication Sig Dispense Refill    aspirin (ECOTRIN) 81 MG EC tablet Take 1 tablet (81 mg total) by mouth once daily.  0    blood sugar diagnostic (GLUCOSE BLOOD) Strp Test glucose 1 x daily 35 strip prn    carbidopa-levodopa  mg (SINEMET)  mg per tablet Take 1.5 tablets by mouth 4 (four) times daily.      diclofenac sodium (VOLTAREN) 1 % Gel Apply 2 g topically 4 (four) times daily as needed. 100 g 5    donepeziL (ARICEPT) 5 MG tablet TAKE 1 TABLET(5 MG) BY MOUTH EVERY EVENING 90 tablet 3    DULoxetine (CYMBALTA) 60 MG capsule Take 1 capsule (60 mg total) by mouth once daily. 90 capsule 3    fish oil-omega-3 fatty acids 300-1,000 mg capsule Take 2 g by mouth once daily.      glimepiride (AMARYL) 1 MG tablet Take 1 tablet (1 mg total) by mouth daily with breakfast. 90 tablet 1    GLUC.METER,DIS.P-LOADED STRIPS (GLUCOSE METER, DISP & STRIPS) Kit Check glucose once per day 1 kit prn    GLUCOSAMINE HCL (GLUCOSAMINE, BULK, MISC) by Misc.(Non-Drug; Combo Route) route.      L. RHAMNOSUS  GG/INULIN (CULTURELLE PROBIOTICS ORAL) Take by mouth.      lancets Misc As directed 100 each prn    metFORMIN (GLUCOPHAGE) 1000 MG tablet TAKE 1 TABLET(1000 MG) BY MOUTH EVERY DAY 90 tablet 3    pravastatin (PRAVACHOL) 20 MG tablet TAKE 1 TABLET(20 MG) BY MOUTH EVERY DAY 90 tablet 2    predniSONE (DELTASONE) 2.5 MG tablet Take 2.5 mg by mouth once daily.      triamcinolone acetonide 0.1% (KENALOG) 0.1 % cream Apply topically 2 (two) times daily as needed. Itching/rash 80 g 0    turmeric root extract 500 mg Cap Take by mouth.      carbidopa-levodopa  mg (SINEMET)  mg per tablet Take 1 tablet by mouth 3 (three) times daily. 270 tablet 3    predniSONE (DELTASONE) 2.5 MG tablet Take 1 tablet (2.5 mg total) by mouth 2 (two) times daily. 180 tablet 3     No current facility-administered medications for this visit.        Review of patient's allergies indicates:  No Known Allergies      Review of Systems   Constitution: Negative for chills and fever.   Cardiovascular: Negative for claudication and leg swelling.   Skin: Positive for color change, nail changes and suspicious lesions. Negative for poor wound healing.   Musculoskeletal: Positive for muscle weakness. Negative for joint swelling, muscle cramps and myalgias.   Gastrointestinal: Negative for nausea and vomiting.   Neurological: Positive for numbness.   Psychiatric/Behavioral: Negative for altered mental status.           Objective:      Physical Exam  Constitutional:       Appearance: Normal appearance.   Cardiovascular:      Pulses:           Dorsalis pedis pulses are 2+ on the right side and 2+ on the left side.        Posterior tibial pulses are 1+ on the right side and 1+ on the left side.      Comments: CFT is < 3 seconds bilateral.  Pedal hair growth is decreased bilateral.  Varicosities noted bilateral.  Mild nonpitting lower extremity edema noted bilateral.  Toes are cool to touch bilateral.    Musculoskeletal:         General:  Deformity present. No tenderness.      Right lower leg: No edema.      Left lower leg: No edema.      Comments: Muscle strength 4/5 in all muscle groups bilateral.  No tenderness nor crepitation with ROM of foot/ankle joints bilateral.  No tenderness with palpation of bilateral foot and ankle.  Bilateral 1st mtp joint arthritis with dorsal exostoses.  Bilateral semi-rigid contracture of all lesser digits.  Gait instability noted on exam.  Increased angle and base of gait.  Decreased stride length and speed.   Skin:     General: Skin is warm and dry.      Capillary Refill: Capillary refill takes 2 to 3 seconds.      Findings: Lesion present. No bruising, ecchymosis, erythema, signs of injury, laceration, petechiae, rash or wound.      Comments: Pedal skin appears thin bilateral.  Toenails x 10 appear thickened by 2 mm, elongated by 6 mm, and discolored with subungual debris.  Dried eschar overlying the dorsal aspect of bilateral 2nd PIP joint, however, no underlying wound appreciated.  No localized sign of infection noted.     Neurological:      Mental Status: He is alert.      Sensory: Sensory deficit present.      Motor: Weakness present. No atrophy.      Comments: Protective sensation per [place] monofilament is absent bilateral.  Vibratory sensation is absent bilateral.  Light touch is absent bilateral.               Assessment:       Encounter Diagnoses   Name Primary?    Type 2 diabetes mellitus with diabetic neuropathy, without long-term current use of insulin     Abrasion of toe of left foot, initial encounter Yes    Abrasion of toe of right foot, initial encounter     Hammer toes of both feet     Onychomycosis due to dermatophyte          Plan:       Jagdeep was seen today for diabetes mellitus, diabetic foot exam and wound care.    Diagnoses and all orders for this visit:    Abrasion of toe of left foot, initial encounter    Type 2 diabetes mellitus with diabetic neuropathy, without long-term  current use of insulin  -     Ambulatory referral/consult to Podiatry    Abrasion of toe of right foot, initial encounter    Hammer toes of both feet    Onychomycosis due to dermatophyte      I counseled the patient on his conditions, their implications and medical management.    Advised to continue offloading the contracted digits with the Teva sandals.    Recommend application of betadine to the areas of eschar build up daily until lesions have sloughed off.    Shoe inspection. Diabetic Foot Education. Patient reminded of the importance of good nutrition and blood sugar control to help prevent podiatric complications of diabetes. Patient instructed on proper foot hygeine. We discussed wearing proper shoe gear, daily foot inspections, never walking without protective shoe gear, never putting sharp instruments to feet    With patient's permission, nails were aggressively reduced and debrided x 10 to their soft tissue attachment mechanically and with electric , removing all offending nail and debris. Patient relates relief following the procedure. He will continue to monitor the areas daily, inspect his feet, wear protective shoe gear when ambulatory, moisturizer to maintain skin integrity and follow in this office in approximately 4 months, sooner p.r.n.    Follow up in about 4 months (around 12/21/2020).    Ismael Melton DPM

## 2020-08-25 ENCOUNTER — TELEPHONE (OUTPATIENT)
Dept: FAMILY MEDICINE | Facility: CLINIC | Age: 80
End: 2020-08-25

## 2020-08-25 DIAGNOSIS — R26.9 GAIT ABNORMALITY: ICD-10-CM

## 2020-08-25 DIAGNOSIS — M35.3 PMR (POLYMYALGIA RHEUMATICA): ICD-10-CM

## 2020-08-25 DIAGNOSIS — G20.C PRIMARY PARKINSONISM: Primary | ICD-10-CM

## 2020-08-25 DIAGNOSIS — M51.36 DDD (DEGENERATIVE DISC DISEASE), LUMBAR: ICD-10-CM

## 2020-08-25 DIAGNOSIS — M47.816 LUMBAR SPONDYLOSIS: ICD-10-CM

## 2020-08-25 DIAGNOSIS — M48.062 SPINAL STENOSIS OF LUMBAR REGION WITH NEUROGENIC CLAUDICATION: ICD-10-CM

## 2020-08-25 NOTE — TELEPHONE ENCOUNTER
Cain reports patient and spouse declined home health, asking for ok to discharge from HH, PT, OT, please advise

## 2020-08-25 NOTE — TELEPHONE ENCOUNTER
----- Message from Skye Moreno sent at 8/25/2020  1:26 PM CDT -----  Regarding: home health  Type:  Needs Medical Advice    Who Called: dion  Symptoms (please be specific): n/a   How long has patient had these symptoms:  n/a  Pharmacy name and phone #:  na/  Would the patient rather a call back or a response via [facility]? Call back  Best Call Back Number: [phone]  Additional Information: Caller is calling in regards to pt caregiver requesting discharge form home health. Thanks

## 2020-08-25 NOTE — TELEPHONE ENCOUNTER
spoke with spouse.  Stated that they were told that he was not going to qualify for more home health and physical therapy at this point.  Apparently was recommended that he start outpatient physical therapy.  They want to go to [facility].  I put in referral for this place.  It goes in as an internal referral though I am not clear that it actually is internal.  May need to call them to make sure that they get this..

## 2020-08-26 NOTE — TELEPHONE ENCOUNTER
----- Message from Susanna Plaza sent at 8/26/2020  8:29 AM CDT -----  Gela from [facility] calling to inform she is Discharging patient and she find that patient would do better with Outpatient therapy at [facility] please give them a call to get patient schedule [phone]. Should you have any question you can also call Gela [phone]      Thanks   Susanna Plaza

## 2020-09-03 RX ORDER — CARBIDOPA AND LEVODOPA 25; 100 MG/1; MG/1
1 TABLET ORAL 3 TIMES DAILY
Qty: 270 TABLET | Refills: 3 | Status: SHIPPED | OUTPATIENT
Start: 2020-09-03 | End: 2020-09-04 | Stop reason: SDUPTHER

## 2020-09-04 ENCOUNTER — TELEPHONE (OUTPATIENT)
Dept: NEUROLOGY | Facility: CLINIC | Age: 80
End: 2020-09-04

## 2020-09-04 RX ORDER — CARBIDOPA AND LEVODOPA 25; 100 MG/1; MG/1
1 TABLET ORAL 3 TIMES DAILY
Qty: 270 TABLET | Refills: 3 | Status: SHIPPED | OUTPATIENT
Start: 2020-09-04 | End: 2020-09-06 | Stop reason: SDUPTHER

## 2020-09-04 NOTE — TELEPHONE ENCOUNTER
Spoke with  Dionicio, she was informed I will send a message to Dr. Malik asking for another prescription of Sinemet  mg to be sent to the pharmacy with instructions and increased dosage.

## 2020-09-06 RX ORDER — CARBIDOPA AND LEVODOPA 25; 100 MG/1; MG/1
1.5 TABLET ORAL 4 TIMES DAILY
Qty: 540 TABLET | Refills: 0 | Status: SHIPPED | OUTPATIENT
Start: 2020-09-06 | End: 2020-09-08

## 2020-09-08 RX ORDER — CARBIDOPA AND LEVODOPA 25; 100 MG/1; MG/1
1 TABLET ORAL 4 TIMES DAILY
Qty: 540 TABLET | Refills: 0 | Status: SHIPPED | OUTPATIENT
Start: 2020-09-08 | End: 2020-10-19

## 2020-09-17 ENCOUNTER — OFFICE VISIT (OUTPATIENT)
Dept: NEUROLOGY | Facility: CLINIC | Age: 80
End: 2020-09-17
Payer: MEDICARE

## 2020-09-17 VITALS
DIASTOLIC BLOOD PRESSURE: 67 MMHG | HEIGHT: 75 IN | HEART RATE: 70 BPM | SYSTOLIC BLOOD PRESSURE: 136 MMHG | BODY MASS INDEX: 20.36 KG/M2

## 2020-09-17 DIAGNOSIS — G20.C PRIMARY PARKINSONISM: ICD-10-CM

## 2020-09-17 DIAGNOSIS — R42 DIZZINESS: ICD-10-CM

## 2020-09-17 PROCEDURE — 3078F DIAST BP <80 MM HG: CPT | Mod: HCNC,CPTII,S$GLB, | Performed by: PSYCHIATRY & NEUROLOGY

## 2020-09-17 PROCEDURE — 99999 PR PBB SHADOW E&M-EST. PATIENT-LVL III: ICD-10-PCS | Mod: PBBFAC,HCNC,, | Performed by: PSYCHIATRY & NEUROLOGY

## 2020-09-17 PROCEDURE — 3078F PR MOST RECENT DIASTOLIC BLOOD PRESSURE < 80 MM HG: ICD-10-PCS | Mod: HCNC,CPTII,S$GLB, | Performed by: PSYCHIATRY & NEUROLOGY

## 2020-09-17 PROCEDURE — 1159F PR MEDICATION LIST DOCUMENTED IN MEDICAL RECORD: ICD-10-PCS | Mod: HCNC,S$GLB,, | Performed by: PSYCHIATRY & NEUROLOGY

## 2020-09-17 PROCEDURE — 1101F PT FALLS ASSESS-DOCD LE1/YR: CPT | Mod: HCNC,CPTII,S$GLB, | Performed by: PSYCHIATRY & NEUROLOGY

## 2020-09-17 PROCEDURE — 99999 PR PBB SHADOW E&M-EST. PATIENT-LVL III: CPT | Mod: PBBFAC,HCNC,, | Performed by: PSYCHIATRY & NEUROLOGY

## 2020-09-17 PROCEDURE — 3075F SYST BP GE 130 - 139MM HG: CPT | Mod: HCNC,CPTII,S$GLB, | Performed by: PSYCHIATRY & NEUROLOGY

## 2020-09-17 PROCEDURE — 1101F PR PT FALLS ASSESS DOC 0-1 FALLS W/OUT INJ PAST YR: ICD-10-PCS | Mod: HCNC,CPTII,S$GLB, | Performed by: PSYCHIATRY & NEUROLOGY

## 2020-09-17 PROCEDURE — 1126F PR PAIN SEVERITY QUANTIFIED, NO PAIN PRESENT: ICD-10-PCS | Mod: HCNC,S$GLB,, | Performed by: PSYCHIATRY & NEUROLOGY

## 2020-09-17 PROCEDURE — 3075F PR MOST RECENT SYSTOLIC BLOOD PRESS GE 130-139MM HG: ICD-10-PCS | Mod: HCNC,CPTII,S$GLB, | Performed by: PSYCHIATRY & NEUROLOGY

## 2020-09-17 PROCEDURE — 99215 PR OFFICE/OUTPT VISIT, EST, LEVL V, 40-54 MIN: ICD-10-PCS | Mod: HCNC,S$GLB,, | Performed by: PSYCHIATRY & NEUROLOGY

## 2020-09-17 PROCEDURE — 1126F AMNT PAIN NOTED NONE PRSNT: CPT | Mod: HCNC,S$GLB,, | Performed by: PSYCHIATRY & NEUROLOGY

## 2020-09-17 PROCEDURE — 99499 RISK ADDL DX/OHS AUDIT: ICD-10-PCS | Mod: HCNC,S$GLB,, | Performed by: PSYCHIATRY & NEUROLOGY

## 2020-09-17 PROCEDURE — 99215 OFFICE O/P EST HI 40 MIN: CPT | Mod: HCNC,S$GLB,, | Performed by: PSYCHIATRY & NEUROLOGY

## 2020-09-17 PROCEDURE — 99499 UNLISTED E&M SERVICE: CPT | Mod: HCNC,S$GLB,, | Performed by: PSYCHIATRY & NEUROLOGY

## 2020-09-17 PROCEDURE — 1159F MED LIST DOCD IN RCRD: CPT | Mod: HCNC,S$GLB,, | Performed by: PSYCHIATRY & NEUROLOGY

## 2020-09-17 RX ORDER — MIDODRINE HYDROCHLORIDE 2.5 MG/1
2.5 TABLET ORAL 3 TIMES DAILY
Qty: 90 TABLET | Refills: 11 | Status: SHIPPED | OUTPATIENT
Start: 2020-09-17 | End: 2020-12-23

## 2020-09-17 NOTE — PROGRESS NOTES
"MOVEMENT DISORDERS [facility]INIC    PCP/Referring Provider: Courtney Malik MD  [id number] [place] [address]  Date of Service: 9/18/2020    Chief Complaint: PDism    Interval Hx    Since last visit,   Recorded orthostatic vitals  1/2 of readings are orthostatic  Systolics are 140    No CHF noted    Struggling to get up from sitting now  Wheelchair bound    Increased from carbidopa/levodopa 25/100mg 1 tab PO QID to 1.5 tabs QID  Back   No major improvement seen - still struggling to walk    7/11/3/7pm      "PriorHPI: Jagdeep Greenberg is a R HANDED 80 y.o. male with a medical issues significant for PD, MCI, Lumbar spinal disease s/p surgery, CKD, CAD, Polymyalgia rheumatica, Prostate cancer, DMII, who presents for establishment of care, seen by Dr. Harden in the past. He notes 3 years ago he had pain in his joints and neuro consult weas ordered. He noticed generalized slowing of movements. Dr. Harden noted L sided lack of arm swing. He started furniture surfing for stability. He got a cane 6 months ago. At this point he can walk a half block before fatiguing. Since December 2019 he's had a decline in gait. He's fallen several times since. No FOG.  Tremor started 3 months ago. Tremor is minor and resting. Handwriting has declined. At times he's spilled food.    He likes ballroom dancing    Medication history:  He's been on carbidopa/levodopa 25/100mg 1 tab PO TID   Unclear ONtime"    PD Review of Symptoms:  Anosmia: 3 years  Dysarthria/Hypophonia: Mild  Dysphagia/Sialorrhea: Mild  Depression: started celexa  Cognitive slowing: memory decline notable- Last MOCA 17  Hallucinations: sees objects out iof the corner of his eyes  Impulsivity: none  Urinary changes: none  Constipation: none  Orthostasis: Occasional since 6 mos - had to cut BP medication  Erectile Dysfunction: since prostate cancer  Dyskinesia: none  Falls: as above  Freezing: none  Micrographia: yes  Sleep issues:  -RBD: talks    Review of Systems: "   Review of Systems   Constitutional: Negative for fever.   HENT: Negative for congestion.    Eyes: Negative for double vision.   Respiratory: Negative for cough and shortness of breath.    Cardiovascular: Negative for chest pain and leg swelling.   Gastrointestinal: Negative for nausea.   Genitourinary: Negative for dysuria.   Musculoskeletal: Positive for falls.   Skin: Negative for rash.   Neurological: Positive for tremors and speech change. Negative for headaches.   Psychiatric/Behavioral: Negative for depression.         Current Medications:  Outpatient Encounter Medications as of 9/17/2020   Medication Sig Dispense Refill    aspirin (ECOTRIN) 81 MG EC tablet Take 1 tablet (81 mg total) by mouth once daily.  0    carbidopa-levodopa  mg (SINEMET)  mg per tablet Take 1 tablet by mouth 4 (four) times daily. 540 tablet 0    donepeziL (ARICEPT) 5 MG tablet TAKE 1 TABLET(5 MG) BY MOUTH EVERY EVENING 90 tablet 3    glimepiride (AMARYL) 1 MG tablet Take 1 tablet (1 mg total) by mouth daily with breakfast. 90 tablet 1    metFORMIN (GLUCOPHAGE) 1000 MG tablet TAKE 1 TABLET(1000 MG) BY MOUTH EVERY DAY 90 tablet 3    pravastatin (PRAVACHOL) 20 MG tablet TAKE 1 TABLET(20 MG) BY MOUTH EVERY DAY 90 tablet 2    predniSONE (DELTASONE) 2.5 MG tablet Take 2.5 mg by mouth once daily.      blood sugar diagnostic (GLUCOSE BLOOD) Strp Test glucose 1 x daily (Patient not taking: Reported on 9/17/2020) 35 strip prn    diclofenac sodium (VOLTAREN) 1 % Gel Apply 2 g topically 4 (four) times daily as needed. (Patient not taking: Reported on 9/17/2020) 100 g 5    DULoxetine (CYMBALTA) 60 MG capsule Take 1 capsule (60 mg total) by mouth once daily. 90 capsule 3    fish oil-omega-3 fatty acids 300-1,000 mg capsule Take 2 g by mouth once daily.      GLUC.METER,DIS.P-LOADED STRIPS (GLUCOSE METER, DISP & STRIPS) Kit Check glucose once per day (Patient not taking: Reported on 9/17/2020) 1 kit prn    GLUCOSAMINE HCL  (GLUCOSAMINE, BULK, MISC) by Misc.(Non-Drug; Combo Route) route.      L. RHAMNOSUS GG/INULIN (CULTURELLE PROBIOTICS ORAL) Take by mouth.      lancets Misc As directed 100 each prn    midodrine (PROAMATINE) 2.5 MG Tab Take 1 tablet (2.5 mg total) by mouth 3 (three) times daily. 90 tablet 11    predniSONE (DELTASONE) 2.5 MG tablet Take 1 tablet (2.5 mg total) by mouth 2 (two) times daily. 180 tablet 3    triamcinolone acetonide 0.1% (KENALOG) 0.1 % cream Apply topically 2 (two) times daily as needed. Itching/rash 80 g 0    turmeric root extract 500 mg Cap Take by mouth.       No facility-administered encounter medications on file as of 9/17/2020.        Past Medical History:  Patient Active Problem List   Diagnosis    Hypertension associated with diabetes    Combined hyperlipidemia associated with type 2 diabetes mellitus    Type 2 diabetes mellitus with diabetic neuropathy, without long-term current use of insulin    History of prostate cancer    H/O adenomatous polyp of colon    Carotid artery plaque    PMR (polymyalgia rheumatica)    Colon polyps    Diverticulosis of large intestine without hemorrhage    Primary osteoarthritis involving multiple joints    Type 2 diabetes mellitus with stage 3 chronic kidney disease    Parkinsonism    Chronic midline low back pain with bilateral sciatica    Chronic gluteal pain    Chronic left shoulder pain    Dupuytren's contracture of both hands    Primary osteoarthritis of left knee    MCI (mild cognitive impairment)    Excessive drinking alcohol    Gait abnormality    Lumbar radiculopathy    Lumbar spondylosis    DDD (degenerative disc disease), lumbar    Spinal stenosis of lumbar region with neurogenic claudication    Chronic pain    Dizziness    Frequent falls    History of compression fracture of spine    History of stroke    Major depression, recurrent, chronic    Coronary artery disease involving native coronary artery of native heart  without angina pectoris       Past Surgical History:  Past Surgical History:   Procedure Laterality Date    APPENDECTOMY      COLONOSCOPY  In 3/2/2010    Repeat 5 years    COLONOSCOPY N/A 9/29/2015    Procedure: COLONOSCOPY;  Surgeon: Erik Brandt MD;  Location: [facility];  Service: Endoscopy;  Laterality: N/A;    COLONOSCOPY N/A 10/11/2016    Procedure: COLONOSCOPY;  Surgeon: Erik Brandt MD;  Location: [facility];  Service: Endoscopy;  Laterality: N/A;    COLONOSCOPY N/A 10/24/2017    Procedure: COLONOSCOPY;  Surgeon: Erik Brandt MD;  Location: [facility];  Service: Endoscopy;  Laterality: N/A;    COLONOSCOPY N/A 8/23/2018    Procedure: COLONOSCOPY;  Surgeon: Nghia Cooper MD;  Location: [facility] ([address] [place] [address]);  Service: Endoscopy;  Laterality: N/A;    FRACTURE SURGERY      HERNIA REPAIR      INJECTION OF FACET JOINT Right 7/23/2019    Procedure: INJECTION, FACET JOINT--Right L3,4,5,S1;  Surgeon: Jax Nelson Jr., MD;  Location: [facility];  Service: Pain Management;  Laterality: Right;  Pt is diabectic    LYMPH NODE DISSECTION      Pelvic    PROSTATECTOMY      RADIOFREQUENCY THERMOCOAGULATION Right 2/25/2020    Procedure: Right L3-5 Lumbar RFA;  Surgeon: Pino Baltazar MD;  Location[facility];  Service: Pain Management;  Laterality: Right;    SHOULDER ARTHROSCOPY      Right       Current Living Situation: home    Social:  Social History     Socioeconomic History    Marital status:      Spouse name: Joaquin    Number of children: 0    Years of education: Not on file    Highest education level: Not on file   Occupational History    Occupation: Retired   Social Needs    Financial resource strain: Not on file    Food insecurity     Worry: Not on file     Inability: Not on file    Transportation needs     Medical: Not on file     Non-medical: Not on file   Tobacco Use    Smoking status: Never Smoker    Smokeless tobacco: Never Used   Substance and Sexual Activity  "   Alcohol use: Yes     Alcohol/week: 35.0 standard drinks     Types: 14 Cans of beer, 21 Shots of liquor per week     Comment: 3 drinks a day    Drug use: No    Sexual activity: Not on file   Lifestyle    Physical activity     Days per week: Not on file     Minutes per session: Not on file    Stress: Not on file   Relationships    Social connections     Talks on phone: Not on file     Gets together: Not on file     Attends [employer]al service: Not on file     Active member of club or organization: Not on file     Attends meetings of clubs or organizations: Not on file     Relationship status: Not on file   Other Topics Concern    Not on file   Social History Narrative    Not on file       Family History:  Family History   Problem Relation Age of Onset    Leukemia Father 68    Anesthesia problems Neg Hx        PHYSICAL:  /67   Pulse 70   Ht 6' 3" (1.905 m)   BMI 20.36 kg/m²     PHYSICAL:  /67   Pulse 70   Ht 6' 3" (1.905 m)   BMI 20.36 kg/m²     General Medical Examination:  General: The patient is alert and cooperative with the exam.   HEENT: Normocephalic, atraumatic.   Neck: Supple.   Chest: Unlabored breathing.   CV: Symmetric pulses.   Ext: No clubbing, cyanosis, or edema.     Mental Status:  General: Good hygiene, appropriate appearance.  Mood/Affect: Appropriate/congruent.  Level of consciousness: Awake, alert.  Orientation: Oriented to person, place, time and situation.  Language: No Dysarthria    Cranial nerves:  I: Not tested  II: PERRL, VFF to counting  III, IV, VI: EOMI with conjugate gaze and no nystagmus on end gaze - some SWJs  V: Facial sensation intact and symmetric over the bilateral V1-V3  VII: Facial muscle activation intact and symmetric over the bilateral upper and lower face  VIII: Hearing intact in the b/l ears and symmetrical to finger rub  IX, X, XII: TUP midline  X: SCMs and shoulder shrug full strength b/l and symmetric    Motor:   Strength Intact throughout " upper and lower extremities, 5/5.    DTRs:  ? Biceps Triceps Brachioradialis Knee Ankle   Left 2+ 2+ 2+ 3+ 2+   Right 2+ 2+ 2+ 2+ 2+       Bradykinesia  ? Finger taps Finger flicks BRAULIO Heel taps   Left 3+ 1+ - -   Right 2+ - - -       Neck tone: nl  ? Arm Leg   Left 2+ 2+   Right 0 0     Sensation:   -Light touch: Intact and symmetric in the bilateral upper and lower extremities.    Coordination:   -Finger to nose: nl    Gait:  Rocks several times to get up then falls back          Assessment//Plan:   Problem List Items Addressed This Visit        Neuro    Parkinsonism    Current Assessment & Plan     Typical iPD now seems medications resistant and now with SWJs.  He appears under-dosed, and gait is affected most. As he could not tolerate higher doses  Levodopa, await adding midodrine before increasing dose past carbidopa/levodopa 25/100mg 1.5 tab PO TID               Other    Dizziness    Current Assessment & Plan     Suggested midodrine 2.5mg PO TID for orthostasis  BP systolic goal <160               Courtney Malik MD, MS [facility]  Department of Neurology  Movement Disorders

## 2020-09-18 NOTE — ASSESSMENT & PLAN NOTE
Typical iPD now seems medications resistant and now with SWJs.  He appears under-dosed, and gait is affected most. As he could not tolerate higher doses  Levodopa, await adding midodrine before increasing dose past carbidopa/levodopa 25/100mg 1.5 tab PO TID

## 2020-09-24 ENCOUNTER — PATIENT MESSAGE (OUTPATIENT)
Dept: NEUROLOGY | Facility: CLINIC | Age: 80
End: 2020-09-24

## 2020-09-29 ENCOUNTER — PATIENT MESSAGE (OUTPATIENT)
Dept: OTHER | Facility: OTHER | Age: 80
End: 2020-09-29

## 2020-10-07 RX ORDER — TRIAMCINOLONE ACETONIDE 1 MG/G
CREAM TOPICAL 2 TIMES DAILY PRN
Qty: 80 G | Refills: 2 | Status: SHIPPED | OUTPATIENT
Start: 2020-10-07 | End: 2021-10-07

## 2020-10-13 RX ORDER — PREDNISONE 2.5 MG/1
2.5 TABLET ORAL DAILY
Qty: 30 TABLET | Refills: 0 | Status: SHIPPED | OUTPATIENT
Start: 2020-10-13 | End: 2020-11-12 | Stop reason: DRUGHIGH

## 2020-10-18 ENCOUNTER — PATIENT OUTREACH (OUTPATIENT)
Dept: ADMINISTRATIVE | Facility: OTHER | Age: 80
End: 2020-10-18

## 2020-10-18 NOTE — PROGRESS NOTES
Care Everywhere: updated  Immunization: updated  Health Maintenance: updated  Media Review:   Legacy Review:   Order placed:   Upcoming appts:  efax sent to Dr. Guy office to obtain eye exam report

## 2020-10-19 ENCOUNTER — OFFICE VISIT (OUTPATIENT)
Dept: NEUROLOGY | Facility: CLINIC | Age: 80
End: 2020-10-19
Payer: MEDICARE

## 2020-10-19 DIAGNOSIS — G31.84 MCI (MILD COGNITIVE IMPAIRMENT): ICD-10-CM

## 2020-10-19 DIAGNOSIS — R42 DIZZINESS: ICD-10-CM

## 2020-10-19 DIAGNOSIS — G20.C PRIMARY PARKINSONISM: Primary | ICD-10-CM

## 2020-10-19 PROCEDURE — 99499 RISK ADDL DX/OHS AUDIT: ICD-10-PCS | Mod: 95,,, | Performed by: PSYCHIATRY & NEUROLOGY

## 2020-10-19 PROCEDURE — 1101F PT FALLS ASSESS-DOCD LE1/YR: CPT | Mod: HCNC,CPTII,95, | Performed by: PSYCHIATRY & NEUROLOGY

## 2020-10-19 PROCEDURE — 99499 UNLISTED E&M SERVICE: CPT | Mod: 95,,, | Performed by: PSYCHIATRY & NEUROLOGY

## 2020-10-19 PROCEDURE — 1159F MED LIST DOCD IN RCRD: CPT | Mod: HCNC,95,, | Performed by: PSYCHIATRY & NEUROLOGY

## 2020-10-19 PROCEDURE — 99215 PR OFFICE/OUTPT VISIT, EST, LEVL V, 40-54 MIN: ICD-10-PCS | Mod: HCNC,95,, | Performed by: PSYCHIATRY & NEUROLOGY

## 2020-10-19 PROCEDURE — 1101F PR PT FALLS ASSESS DOC 0-1 FALLS W/OUT INJ PAST YR: ICD-10-PCS | Mod: HCNC,CPTII,95, | Performed by: PSYCHIATRY & NEUROLOGY

## 2020-10-19 PROCEDURE — 1159F PR MEDICATION LIST DOCUMENTED IN MEDICAL RECORD: ICD-10-PCS | Mod: HCNC,95,, | Performed by: PSYCHIATRY & NEUROLOGY

## 2020-10-19 PROCEDURE — 99215 OFFICE O/P EST HI 40 MIN: CPT | Mod: HCNC,95,, | Performed by: PSYCHIATRY & NEUROLOGY

## 2020-10-19 RX ORDER — CARBIDOPA AND LEVODOPA 25; 100 MG/1; MG/1
2 TABLET ORAL 4 TIMES DAILY
Qty: 540 TABLET | Refills: 0 | Status: SHIPPED | OUTPATIENT
Start: 2020-10-19 | End: 2021-10-19

## 2020-10-19 RX ORDER — DONEPEZIL HYDROCHLORIDE 10 MG/1
10 TABLET, FILM COATED ORAL NIGHTLY
Qty: 30 TABLET | Refills: 12 | Status: SHIPPED | OUTPATIENT
Start: 2020-10-19 | End: 2020-11-12

## 2020-10-19 NOTE — PROGRESS NOTES
The patient location is: home  The chief complaint leading to consultation is: PD    Visit type: audiovisual    Face to Face time with patient: 20 mins  20 minutes of total time spent on the encounter, which includes face to face time and non-face to face time preparing to see the patient (eg, review of tests), Obtaining and/or reviewing separately obtained history, Documenting clinical information in the electronic or other health record, Independently interpreting results (not separately reported) and communicating results to the patient/family/caregiver, or Care coordination (not separately reported).     Each patient to whom he or she provides medical services by telemedicine is:  (1) informed of the relationship between the physician and patient and the respective role of any other health care provider with respect to management of the patient; and (2) notified that he or she may decline to receive medical services by telemedicine and may withdraw from such care at any time.    Notes:       MOVEMENT DISORDERS CLINIC    PCP/Referring Provider: No referring provider defined for this encounter.  Date of Service: 10/19/2020    Chief Complaint: PDism    Interval Hx    Since last visit,   Started midodrine 2.5mg PO TID  No longer orthostatic    Increased from carbidopa/levodopa 25/100mg 1 tab PO QID to 1.5 tabs QID  7/11/3/7pm    Walks with a walker during the day and legs freeze up after 6pm.  Goes to bed at 8PM    Struggling to get up from sitting now  Wheelchair bound  Stopped PT    Continues aricept 5mg QHS  Increased hallucinations at times    PD Review of Symptoms:  Anosmia: 3 years  Dysarthria/Hypophonia: Mild  Dysphagia/Sialorrhea: Mild  Depression: started celexa  Cognitive slowing: memory decline notable- Last MOCA 17  Hallucinations: sees objects out iof the corner of his eyes  Impulsivity: none  Urinary changes: none  Constipation: none  Orthostasis: Occasional since 6 mos - had to cut BP  "medication  Erectile Dysfunction: since prostate cancer  Dyskinesia: none  Falls: as above  Freezing: ?  Micrographia: yes  Sleep issues:  -RBD: talks    "PriorHPI: Jagdeep Greenberg is a R HANDED 80 y.o. male with a medical issues significant for PD, MCI, Lumbar spinal disease s/p surgery, CKD, CAD, Polymyalgia rheumatica, Prostate cancer, DMII, who presents for establishment of care, seen by Dr. Harden in the past. He notes 3 years ago he had pain in his joints and neuro consult weas ordered. He noticed generalized slowing of movements. Dr. Harden noted L sided lack of arm swing. He started furniture surfing for stability. He got a cane 6 months ago. At this point he can walk a half block before fatiguing. Since December 2019 he's had a decline in gait. He's fallen several times since. No FOG.  Tremor started 3 months ago. Tremor is minor and resting. Handwriting has declined. At times he's spilled food.    He likes ballroom dancing    Medication history:  He's been on carbidopa/levodopa 25/100mg 1 tab PO TID   Unclear ONtime"        Review of Systems:   Review of Systems   Constitutional: Negative for fever.   HENT: Negative for congestion.    Eyes: Negative for double vision.   Respiratory: Negative for cough and shortness of breath.    Cardiovascular: Negative for chest pain and leg swelling.   Gastrointestinal: Negative for nausea.   Genitourinary: Negative for dysuria.   Musculoskeletal: Positive for falls.   Skin: Negative for rash.   Neurological: Positive for tremors and speech change. Negative for headaches.   Psychiatric/Behavioral: Negative for depression.         Current Medications:  Outpatient Encounter Medications as of 10/19/2020   Medication Sig Dispense Refill    aspirin (ECOTRIN) 81 MG EC tablet Take 1 tablet (81 mg total) by mouth once daily.  0    blood sugar diagnostic (GLUCOSE BLOOD) Strp Test glucose 1 x daily (Patient not taking: Reported on 9/17/2020) 35 strip prn    carbidopa-levodopa "  mg (SINEMET)  mg per tablet Take 2 tablets by mouth 4 (four) times daily. 540 tablet 0    diclofenac sodium (VOLTAREN) 1 % Gel Apply 2 g topically 4 (four) times daily as needed. (Patient not taking: Reported on 9/17/2020) 100 g 5    donepeziL (ARICEPT) 10 MG tablet Take 1 tablet (10 mg total) by mouth every evening. 30 tablet 12    DULoxetine (CYMBALTA) 60 MG capsule Take 1 capsule (60 mg total) by mouth once daily. 90 capsule 3    fish oil-omega-3 fatty acids 300-1,000 mg capsule Take 2 g by mouth once daily.      glimepiride (AMARYL) 1 MG tablet Take 1 tablet (1 mg total) by mouth daily with breakfast. 90 tablet 1    GLUC.METER,DIS.P-LOADED STRIPS (GLUCOSE METER, DISP & STRIPS) Kit Check glucose once per day (Patient not taking: Reported on 9/17/2020) 1 kit prn    GLUCOSAMINE HCL (GLUCOSAMINE, BULK, MISC) by Misc.(Non-Drug; Combo Route) route.      L. RHAMNOSUS GG/INULIN (CULTURELLE PROBIOTICS ORAL) Take by mouth.      lancets Misc As directed 100 each prn    metFORMIN (GLUCOPHAGE) 1000 MG tablet TAKE 1 TABLET(1000 MG) BY MOUTH EVERY DAY 90 tablet 3    midodrine (PROAMATINE) 2.5 MG Tab Take 1 tablet (2.5 mg total) by mouth 3 (three) times daily. 90 tablet 11    pravastatin (PRAVACHOL) 20 MG tablet TAKE 1 TABLET(20 MG) BY MOUTH EVERY DAY 90 tablet 2    predniSONE (DELTASONE) 2.5 MG tablet Take 1 tablet (2.5 mg total) by mouth 2 (two) times daily. 180 tablet 3    predniSONE (DELTASONE) 2.5 MG tablet Take 1 tablet (2.5 mg total) by mouth once daily. 30 tablet 0    triamcinolone acetonide 0.1% (KENALOG) 0.1 % cream Apply topically 2 (two) times daily as needed. Itching/rash 80 g 2    turmeric root extract 500 mg Cap Take by mouth.      [DISCONTINUED] carbidopa-levodopa  mg (SINEMET)  mg per tablet Take 1 tablet by mouth 4 (four) times daily. 540 tablet 0    [DISCONTINUED] donepeziL (ARICEPT) 5 MG tablet TAKE 1 TABLET(5 MG) BY MOUTH EVERY EVENING 90 tablet 3     No  facility-administered encounter medications on file as of 10/19/2020.        Past Medical History:  Patient Active Problem List   Diagnosis    Hypertension associated with diabetes    Combined hyperlipidemia associated with type 2 diabetes mellitus    Type 2 diabetes mellitus with diabetic neuropathy, without long-term current use of insulin    History of prostate cancer    H/O adenomatous polyp of colon    Carotid artery plaque    PMR (polymyalgia rheumatica)    Colon polyps    Diverticulosis of large intestine without hemorrhage    Primary osteoarthritis involving multiple joints    Type 2 diabetes mellitus with stage 3 chronic kidney disease    Parkinsonism    Chronic midline low back pain with bilateral sciatica    Chronic gluteal pain    Chronic left shoulder pain    Dupuytren's contracture of both hands    Primary osteoarthritis of left knee    MCI (mild cognitive impairment)    Excessive drinking alcohol    Gait abnormality    Lumbar radiculopathy    Lumbar spondylosis    DDD (degenerative disc disease), lumbar    Spinal stenosis of lumbar region with neurogenic claudication    Chronic pain    Dizziness    Frequent falls    History of compression fracture of spine    History of stroke    Major depression, recurrent, chronic    Coronary artery disease involving native coronary artery of native heart without angina pectoris       Past Surgical History:  Past Surgical History:   Procedure Laterality Date    APPENDECTOMY      COLONOSCOPY  In 3/2/2010    Repeat 5 years    COLONOSCOPY N/A 9/29/2015    Procedure: COLONOSCOPY;  Surgeon: Erik Brandt MD;  Location: [facility];  Service: Endoscopy;  Laterality: N/A;    COLONOSCOPY N/A 10/11/2016    Procedure: COLONOSCOPY;  Surgeon: Erik Brandt MD;  Location: [facility];  Service: Endoscopy;  Laterality: N/A;    COLONOSCOPY N/A 10/24/2017    Procedure: COLONOSCOPY;  Surgeon: Erik Brandt MD;  Location: [facility];  Service:  Endoscopy;  Laterality: N/A;    COLONOSCOPY N/A 8/23/2018    Procedure: COLONOSCOPY;  Surgeon: Nghia Cooper MD;  Location: [facility] (4TH FLR);  Service: Endoscopy;  Laterality: N/A;    FRACTURE SURGERY      HERNIA REPAIR      INJECTION OF FACET JOINT Right 7/23/2019    Procedure: INJECTION, FACET JOINT--Right L3,4,5,S1;  Surgeon: Jax Nelson Jr., MD;  Location: [facility];  Service: Pain Management;  Laterality: Right;  Pt is diabectic    LYMPH NODE DISSECTION      Pelvic    PROSTATECTOMY      RADIOFREQUENCY THERMOCOAGULATION Right 2/25/2020    Procedure: Right L3-5 Lumbar RFA;  Surgeon: Pino Baltazar MD;  Location: [facility];  Service: Pain Management;  Laterality: Right;    SHOULDER ARTHROSCOPY      Right       Current Living Situation: home    Social:  Social History     Socioeconomic History    Marital status:      Spouse name: Joaquin    Number of children: 0    Years of education: Not on file    Highest education level: Not on file   Occupational History    Occupation: Retired   Social Needs    Financial resource strain: Not on file    Food insecurity     Worry: Not on file     Inability: Not on file    Transportation needs     Medical: Not on file     Non-medical: Not on file   Tobacco Use    Smoking status: Never Smoker    Smokeless tobacco: Never Used   Substance and Sexual Activity    Alcohol use: Yes     Alcohol/week: 35.0 standard drinks     Types: 14 Cans of beer, 21 Shots of liquor per week     Comment: 3 drinks a day    Drug use: No    Sexual activity: Not on file   Lifestyle    Physical activity     Days per week: Not on file     Minutes per session: Not on file    Stress: Not on file   Relationships    Social connections     Talks on phone: Not on file     Gets together: Not on file     Attends Sabianist service: Not on file     Active member of club or organization: Not on file     Attends meetings of clubs or organizations: Not on file      Relationship status: Not on file   Other Topics Concern    Not on file   Social History Narrative    Not on file       Family History:  Family History   Problem Relation Age of Onset    Leukemia Father 68    Anesthesia problems Neg Hx        PHYSICAL:  There were no vitals taken for this visit.    Physical Exam  Constitutional: Well-developed, well-nourished, appears stated age  Eyes: No scleral icterus  ENT: Moist oral mucosa  Cardiovascular: No lower extremity edema   Respiratory: No labored breathing   Skin: No rash   Hematologic: No bruising    Other: GI/ deferred   · Mental status: Alert and oriented to person, place, time, and situation;   · follows commands  · Speech: normal (not dysarthric), no aphasia  · Cranial nerves:            · CN II: Pupils mid-position and equal, not tested light or accommodation  · CN III, IV, VI: Extraocular movements full, no nystagmus visualized  · CN V: Not tested   · CN VII: Face strong and symmetric bilaterally   · CN VIII: Hearing intact to voice and conversation   · CN IX, X: Palate raises midline and symmetric   · CN XI: Strong shoulder shrug B/L  · CN XII: Tongue appears midline   · Motor: Normal bulk by appearance, no drift   · Sensory: Not tested    · Gait: Not tested  · Deep tendon reflexes: Not tested  · Movement/Coordination                    No hypophonic speech.                     Mod facial masking.                   No tremor with rest, posture, kinesis, or intention.                     No other dystonia, chorea, athetosis, myoclonus, or tics visualized.    Bradykinesia  ? Finger taps Finger flicks BRAULIO Heel taps   Left 1+ - - 2+   Right 1+ - - 1+       Assessment//Plan:   Problem List Items Addressed This Visit        Neuro    Parkinsonism - Primary    Current Assessment & Plan     Parkinsonism now seems medications resistant and now with SWJs.  He appears under-dosed, and gait is affected most. As BP is now addressed may raise levodopa    Suggested    carbidopa/levodopa 25/100mg  1.5/1.5/2/2 tabs    Aggressive home PT           Relevant Orders    SUBSEQUENT HOME HEALTH ORDERS    MCI (mild cognitive impairment)    Current Assessment & Plan     Increase Aricept 10mg QHS            Other    Dizziness    Current Assessment & Plan     Better with midodrine 2.5mg PO TID  BPS remain <160               Courtney Malik MD, MS  [facility]  Department of Neurology  Movement Disorders

## 2020-10-19 NOTE — ASSESSMENT & PLAN NOTE
Parkinsonism now seems medications resistant and now with SWJs.  He appears under-dosed, and gait is affected most. As BP is now addressed may raise levodopa    Suggested   carbidopa/levodopa 25/100mg  1.5/1.5/2/2 tabs    Aggressive home PT

## 2020-10-20 PROCEDURE — G0180 PR HOME HEALTH MD CERTIFICATION: ICD-10-PCS | Mod: ,,, | Performed by: PSYCHIATRY & NEUROLOGY

## 2020-10-20 PROCEDURE — G0180 MD CERTIFICATION HHA PATIENT: HCPCS | Mod: ,,, | Performed by: PSYCHIATRY & NEUROLOGY

## 2020-11-03 ENCOUNTER — EXTERNAL HOME HEALTH (OUTPATIENT)
Dept: HOME HEALTH SERVICES | Facility: HOSPITAL | Age: 80
End: 2020-11-03
Payer: MEDICARE

## 2020-11-06 ENCOUNTER — PATIENT OUTREACH (OUTPATIENT)
Dept: ADMINISTRATIVE | Facility: OTHER | Age: 80
End: 2020-11-06

## 2020-11-09 ENCOUNTER — PATIENT MESSAGE (OUTPATIENT)
Dept: NEUROLOGY | Facility: CLINIC | Age: 80
End: 2020-11-09

## 2020-11-12 ENCOUNTER — OFFICE VISIT (OUTPATIENT)
Dept: FAMILY MEDICINE | Facility: CLINIC | Age: 80
End: 2020-11-12
Payer: MEDICARE

## 2020-11-12 ENCOUNTER — PATIENT MESSAGE (OUTPATIENT)
Dept: FAMILY MEDICINE | Facility: CLINIC | Age: 80
End: 2020-11-12

## 2020-11-12 ENCOUNTER — TELEPHONE (OUTPATIENT)
Dept: FAMILY MEDICINE | Facility: CLINIC | Age: 80
End: 2020-11-12

## 2020-11-12 DIAGNOSIS — E11.69 COMBINED HYPERLIPIDEMIA ASSOCIATED WITH TYPE 2 DIABETES MELLITUS: ICD-10-CM

## 2020-11-12 DIAGNOSIS — G20.C PRIMARY PARKINSONISM: ICD-10-CM

## 2020-11-12 DIAGNOSIS — E11.22 TYPE 2 DIABETES MELLITUS WITH STAGE 3 CHRONIC KIDNEY DISEASE, WITHOUT LONG-TERM CURRENT USE OF INSULIN, UNSPECIFIED WHETHER STAGE 3A OR 3B CKD: ICD-10-CM

## 2020-11-12 DIAGNOSIS — Z85.46 HISTORY OF PROSTATE CANCER: ICD-10-CM

## 2020-11-12 DIAGNOSIS — N18.30 TYPE 2 DIABETES MELLITUS WITH STAGE 3 CHRONIC KIDNEY DISEASE, WITHOUT LONG-TERM CURRENT USE OF INSULIN, UNSPECIFIED WHETHER STAGE 3A OR 3B CKD: ICD-10-CM

## 2020-11-12 DIAGNOSIS — M35.3 PMR (POLYMYALGIA RHEUMATICA): ICD-10-CM

## 2020-11-12 DIAGNOSIS — R11.2 NON-INTRACTABLE VOMITING WITH NAUSEA, UNSPECIFIED VOMITING TYPE: Primary | ICD-10-CM

## 2020-11-12 DIAGNOSIS — E78.2 COMBINED HYPERLIPIDEMIA ASSOCIATED WITH TYPE 2 DIABETES MELLITUS: ICD-10-CM

## 2020-11-12 DIAGNOSIS — R63.4 WEIGHT LOSS: ICD-10-CM

## 2020-11-12 PROCEDURE — 1157F ADVNC CARE PLAN IN RCRD: CPT | Mod: HCNC,95,, | Performed by: FAMILY MEDICINE

## 2020-11-12 PROCEDURE — 99214 PR OFFICE/OUTPT VISIT, EST, LEVL IV, 30-39 MIN: ICD-10-PCS | Mod: HCNC,95,, | Performed by: FAMILY MEDICINE

## 2020-11-12 PROCEDURE — 99499 RISK ADDL DX/OHS AUDIT: ICD-10-PCS | Mod: 95,,, | Performed by: FAMILY MEDICINE

## 2020-11-12 PROCEDURE — 1157F PR ADVANCE CARE PLAN OR EQUIV PRESENT IN MEDICAL RECORD: ICD-10-PCS | Mod: HCNC,95,, | Performed by: FAMILY MEDICINE

## 2020-11-12 PROCEDURE — 99214 OFFICE O/P EST MOD 30 MIN: CPT | Mod: HCNC,95,, | Performed by: FAMILY MEDICINE

## 2020-11-12 PROCEDURE — 99499 UNLISTED E&M SERVICE: CPT | Mod: 95,,, | Performed by: FAMILY MEDICINE

## 2020-11-12 PROCEDURE — 1159F MED LIST DOCD IN RCRD: CPT | Mod: HCNC,95,, | Performed by: FAMILY MEDICINE

## 2020-11-12 PROCEDURE — 1159F PR MEDICATION LIST DOCUMENTED IN MEDICAL RECORD: ICD-10-PCS | Mod: HCNC,95,, | Performed by: FAMILY MEDICINE

## 2020-11-12 RX ORDER — ONDANSETRON 4 MG/1
4 TABLET, ORALLY DISINTEGRATING ORAL EVERY 8 HOURS PRN
Qty: 30 TABLET | Refills: 1 | Status: SHIPPED | OUTPATIENT
Start: 2020-11-12

## 2020-11-12 NOTE — PROGRESS NOTES
Primary Care Telemedicine Note    The patient location is:  [place]  The chief complaint leading to consultation is: per below note  Total time spent with patient:  40 min      Visit type: Virtual visit with synchronous audio and video  Each patient to whom he or she provides medical services by telemedicine is:  (1) informed of the relationship between the physician and patient and the respective role of any other health care provider with respect to management of the patient; and (2) notified that he or she may decline to receive medical services by telemedicine and may withdraw from such care at any time.    Complains of nausea with intermittent vomiting onset during the past couple of weeks.  Mostly just the nausea.  States 12 lb weight loss.  Decreased appetite.  Denies abdominal pain.  He does get constipation.  Unclear whether the nausea is related to eating or not.  Denies any fever chills.  No hematuria or dysuria.  No dysphagia.  He does have Parkinson's disease with memory issues and polymyalgia rheumatica..  Recently had Sinemet increased, then decreased again.  His Aricept was recently increased.  His prednisone was recently decreased.  He stop taking his metformin and glimepiride.  He is not checking glucose readings.  He has been slowly worsening with regards to his parkinsonism and activity.  Sleeps a lot during the day.  Uses walker at home  Remote treated prostate cancer.  Previous adenomatous colon polyps.  Colonoscopy up-to-date.  .  Diabetes Management Status    Statin: Taking  ACE/ARB: Not taking    Screening or Prevention Patient's value Goal Complete/Controlled?   HgA1C Testing and Control   Lab Results   Component Value Date    HGBA1C 5.6 08/13/2020      Annually/Less than 8% Yes   Lipid profile : 11/22/2019 Annually Yes   LDL control Lab Results   Component Value Date    LDLCALC 97.0 11/22/2019    Annually/Less than 100 mg/dl  Yes   Nephropathy screening Lab Results   Component Value  Date    LABMICR 35.0 11/22/2019     Lab Results   Component Value Date    PROTEINUA Negative 02/06/2020    Annually Yes   Blood pressure BP Readings from Last 1 Encounters:   09/17/20 136/67    Less than 140/90 Yes   Dilated retinal exam : 03/08/2019 Annually No   Foot exam   : 02/06/2020 Annually Yes       Diagnoses and all orders for this visit:    Non-intractable vomiting with nausea, unspecified vomiting type  -     US Abdomen Complete; Future    Weight loss  -     CBC Auto Differential; Future  -     Comprehensive Metabolic Panel; Future  -     Urinalysis, Reflex to Urine Culture Urine, Clean Catch; Future  -     TSH; Future  -     US Abdomen Complete; Future  -     Prostate Specific Antigen, Diagnostic; Future    Type 2 diabetes mellitus with stage 3 chronic kidney disease, without long-term current use of insulin, unspecified whether stage 3a or 3b CKD    Primary Parkinsonism    PMR (polymyalgia rheumatica)  -     C-Reactive Protein; Future  -     Sedimentation rate; Future    History of prostate cancer  -     Microalbumin/Creatinine Ratio, Urine; Future  -     Urinalysis, Reflex to Urine Culture Urine, Clean Catch; Future  -     Prostate Specific Antigen, Diagnostic; Future    Combined hyperlipidemia associated with type 2 diabetes mellitus  -     Lipid Panel; Future  -     Microalbumin/Creatinine Ratio, Urine; Future  -     Hemoglobin A1C; Future    Other orders  -     ondansetron (ZOFRAN-ODT) 4 MG TbDL; Take 1 tablet (4 mg total) by mouth every 8 (eight) hours as needed (nausea/vomiting).     unclear etiology of symptoms.  We will have him discontinue the Aricept for now due to potential for side effects.  He will continue his Sinemet at current dosing.  He will increase his prednisone back to the previous 2.5 mg twice a day dose.  Obtain laboratory evaluation as above and ultrasound.  If unrevealing and if not improving would likely need further imaging or GI workup/upper endoscopy.  He will stay off of  the metformin and glimepiride for now.           Past Medical History:  Past Medical History:   Diagnosis Date    Adenomatous polyp of colon     Arthritis     Back pain     Carotid artery plaque     Less than 50%    Chronic kidney disease, stage 3     Closed fracture of one rib 6/18/2020    Compression fracture of body of thoracic vertebra 6/18/2020    Coronary artery disease involving native coronary artery of native heart without angina pectoris 8/2/2020    Diabetes mellitus, type 2     Diabetes with neurologic complications     Erectile dysfunction following radical prostatectomy     Fractures     Hyperlipidemia LDL goal < 100     Hypertension goal BP (blood pressure) < 130/80     Lacunar stroke 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    Lacunar stroke 8/17/2017    Major depression, recurrent, chronic 8/2/2020    MCI (mild cognitive impairment)     Nonproliferative diabetic retinopathy of both eyes 2019    Parkinson disease     Parkinsonism 4/11/2017    Prostate cancer 2001    Renal manifestation of secondary diabetes mellitus     Stroke      Past Surgical History:   Procedure Laterality Date    APPENDECTOMY      COLONOSCOPY  In 3/2/2010    Repeat 5 years    COLONOSCOPY N/A 9/29/2015    Procedure: COLONOSCOPY;  Surgeon: Erik Brandt MD;  Location: [facility];  Service: Endoscopy;  Laterality: N/A;    COLONOSCOPY N/A 10/11/2016    Procedure: COLONOSCOPY;  Surgeon: Erik Brandt MD;  Location: [facility];  Service: Endoscopy;  Laterality: N/A;    COLONOSCOPY N/A 10/24/2017    Procedure: COLONOSCOPY;  Surgeon: Erik Brandt MD;  Location: [facility];  Service: Endoscopy;  Laterality: N/A;    COLONOSCOPY N/A 8/23/2018    Procedure: COLONOSCOPY;  Surgeon: Nghia Cooper MD;  Location: [facility] ([address] [place] [address]);  Service: Endoscopy;  Laterality: N/A;    FRACTURE SURGERY      HERNIA REPAIR      INJECTION OF FACET JOINT Right 7/23/2019    Procedure: INJECTION, FACET JOINT--Right L3,4,5,S1;  Surgeon: Jax GUERRA  Leslie Dawson MD;  Location: [facility];  Service: Pain Management;  Laterality: Right;  Pt is diabectic    LYMPH NODE DISSECTION      Pelvic    PROSTATECTOMY      RADIOFREQUENCY THERMOCOAGULATION Right 2/25/2020    Procedure: Right L3-5 Lumbar RFA;  Surgeon: Pino Baltazar MD;  Location: [facility];  Service: Pain Management;  Laterality: Right;    SHOULDER ARTHROSCOPY      Right     Social History     Socioeconomic History    Marital status:      Spouse name: Joaquin    Number of children: 0    Years of education: Not on file    Highest education level: Not on file   Occupational History    Occupation: Retired   Social Needs    Financial resource strain: Not on file    Food insecurity     Worry: Not on file     Inability: Not on file    Transportation needs     Medical: Not on file     Non-medical: Not on file   Tobacco Use    Smoking status: Never Smoker    Smokeless tobacco: Never Used   Substance and Sexual Activity    Alcohol use: Yes     Alcohol/week: 35.0 standard drinks     Types: 14 Cans of beer, 21 Shots of liquor per week     Comment: 3 drinks a day    Drug use: No    Sexual activity: Not on file   Lifestyle    Physical activity     Days per week: Not on file     Minutes per session: Not on file    Stress: Not on file   Relationships    Social connections     Talks on phone: Not on file     Gets together: Not on file     Attends Taoist service: Not on file     Active member of club or organization: Not on file     Attends meetings of clubs or organizations: Not on file     Relationship status: Not on file   Other Topics Concern    Not on file   Social History Narrative    Not on file     Family History   Problem Relation Age of Onset    Leukemia Father 68    Anesthesia problems Neg Hx      Review of patient's allergies indicates:  No Known Allergies  Current Outpatient Medications on File Prior to Visit   Medication Sig Dispense Refill    aspirin (ECOTRIN) 81 MG  EC tablet Take 1 tablet (81 mg total) by mouth once daily.  0    blood sugar diagnostic (GLUCOSE BLOOD) Strp Test glucose 1 x daily (Patient not taking: Reported on 9/17/2020) 35 strip prn    carbidopa-levodopa  mg (SINEMET)  mg per tablet Take 2 tablets by mouth 4 (four) times daily. 540 tablet 0    DULoxetine (CYMBALTA) 60 MG capsule Take 1 capsule (60 mg total) by mouth once daily. 90 capsule 3    fish oil-omega-3 fatty acids 300-1,000 mg capsule Take 2 g by mouth once daily.      GLUC.METER,DIS.P-LOADED STRIPS (GLUCOSE METER, DISP & STRIPS) Kit Check glucose once per day (Patient not taking: Reported on 9/17/2020) 1 kit prn    GLUCOSAMINE HCL (GLUCOSAMINE, BULK, MISC) by Misc.(Non-Drug; Combo Route) route.      L. RHAMNOSUS GG/INULIN (CULTURELLE PROBIOTICS ORAL) Take by mouth.      lancets Misc As directed 100 each prn    midodrine (PROAMATINE) 2.5 MG Tab Take 1 tablet (2.5 mg total) by mouth 3 (three) times daily. 90 tablet 11    predniSONE (DELTASONE) 2.5 MG tablet Take 1 tablet (2.5 mg total) by mouth 2 (two) times daily. 180 tablet 3    triamcinolone acetonide 0.1% (KENALOG) 0.1 % cream Apply topically 2 (two) times daily as needed. Itching/rash 80 g 2    [DISCONTINUED] diclofenac sodium (VOLTAREN) 1 % Gel Apply 2 g topically 4 (four) times daily as needed. (Patient not taking: Reported on 9/17/2020) 100 g 5    [DISCONTINUED] donepeziL (ARICEPT) 10 MG tablet Take 1 tablet (10 mg total) by mouth every evening. 30 tablet 12    [DISCONTINUED] glimepiride (AMARYL) 1 MG tablet Take 1 tablet (1 mg total) by mouth daily with breakfast. 90 tablet 1    [DISCONTINUED] metFORMIN (GLUCOPHAGE) 1000 MG tablet TAKE 1 TABLET(1000 MG) BY MOUTH EVERY DAY 90 tablet 3    [DISCONTINUED] pravastatin (PRAVACHOL) 20 MG tablet TAKE 1 TABLET(20 MG) BY MOUTH EVERY DAY 90 tablet 2    [DISCONTINUED] predniSONE (DELTASONE) 2.5 MG tablet Take 1 tablet (2.5 mg total) by mouth once daily. 30 tablet 0     [DISCONTINUED] turmeric root extract 500 mg Cap Take by mouth.       No current facility-administered medications on file prior to visit.      Answers for HPI/ROS submitted by the patient on 11/12/2020   Abdominal pain  Chronicity: new  Onset: 1 to 4 weeks ago  Onset quality: gradual  Frequency: daily  Episode duration: 2 days  Progression since onset: rapidly worsening  Pain location: generalized abdominal region  Pain - numeric: 4/10  Pain quality: aching  Radiates to: does not radiate  anorexia: Yes  arthralgias: Yes  belching: Yes  constipation: Yes  diarrhea: No  dysuria: No  fever: No  flatus: Yes  frequency: No  headaches: No  hematochezia: No  hematuria: No  melena: No  myalgias: No  nausea: Yes  weight loss: Yes  vomiting: Yes  Aggravated by: eating  Relieved by: bowel movements, vomiting  Pain severity: moderate  Improvement on treatment: no relief    There were no vitals filed for this visit.    Wt Readings from Last 3 Encounters:   08/21/20 73.9 kg (162 lb 14.7 oz)   07/30/20 73.9 kg (163 lb)   02/25/20 74.3 kg (163 lb 14.6 oz)       APPEARANCE: Well nourished, well developed, in no acute distress.    MENTAL STATUS: Alert.  Oriented x 3.  Head atraumatic normocephalic no obvious sinus swelling  Eyes extraocular movements intact.  No obvious conjunctivitis  Neck supple  Chest no respiratory distress noted.  No accessory muscle use.  No pain with self palpation of abdomen.

## 2020-11-12 NOTE — Clinical Note
Please see Orders.  See if we can Get home health to go in and draw all the lab work and  get urine.  Please schedule the ultrasound in [place].

## 2020-11-12 NOTE — TELEPHONE ENCOUNTER
----- Message from Jacqueline Smith sent at 11/12/2020  3:17 PM CST -----  Regarding: no nurse is in the home  Contact: aldair with [facility]  Caller is requesting a call back regarding an order that was received for a lab draw but the pt was admitted as a Physical therapy only pt. There is no nurse in the home.  Please call back at [phone].  Thanks.

## 2020-11-13 ENCOUNTER — PATIENT MESSAGE (OUTPATIENT)
Dept: RHEUMATOLOGY | Facility: CLINIC | Age: 80
End: 2020-11-13

## 2020-11-13 DIAGNOSIS — M35.3 PMR (POLYMYALGIA RHEUMATICA): ICD-10-CM

## 2020-11-13 RX ORDER — PREDNISONE 2.5 MG/1
TABLET ORAL
Qty: 30 TABLET | Refills: 0 | Status: SHIPPED | OUTPATIENT
Start: 2020-11-13 | End: 2020-12-01 | Stop reason: SDUPTHER

## 2020-11-13 NOTE — TELEPHONE ENCOUNTER
Refilled pred for only 30 days    Last seen 2/2020  Needs labs - reg 4 and follow up visit either in person or virtual  pls call and schedule

## 2020-11-14 ENCOUNTER — PATIENT MESSAGE (OUTPATIENT)
Dept: NEUROLOGY | Facility: CLINIC | Age: 80
End: 2020-11-14

## 2020-11-16 ENCOUNTER — PATIENT MESSAGE (OUTPATIENT)
Dept: FAMILY MEDICINE | Facility: CLINIC | Age: 80
End: 2020-11-16

## 2020-11-16 RX ORDER — DONEPEZIL HYDROCHLORIDE 5 MG/1
2.5 TABLET, FILM COATED ORAL NIGHTLY
COMMUNITY
Start: 2020-11-16

## 2020-11-17 ENCOUNTER — PATIENT MESSAGE (OUTPATIENT)
Dept: FAMILY MEDICINE | Facility: CLINIC | Age: 80
End: 2020-11-17

## 2020-11-19 ENCOUNTER — LAB VISIT (OUTPATIENT)
Dept: LAB | Facility: HOSPITAL | Age: 80
End: 2020-11-19
Attending: FAMILY MEDICINE
Payer: MEDICARE

## 2020-11-19 DIAGNOSIS — M35.3 PMR (POLYMYALGIA RHEUMATICA): ICD-10-CM

## 2020-11-19 DIAGNOSIS — Z85.46 HISTORY OF PROSTATE CANCER: ICD-10-CM

## 2020-11-19 DIAGNOSIS — E11.69 COMBINED HYPERLIPIDEMIA ASSOCIATED WITH TYPE 2 DIABETES MELLITUS: ICD-10-CM

## 2020-11-19 DIAGNOSIS — R63.4 WEIGHT LOSS: ICD-10-CM

## 2020-11-19 DIAGNOSIS — E78.2 COMBINED HYPERLIPIDEMIA ASSOCIATED WITH TYPE 2 DIABETES MELLITUS: ICD-10-CM

## 2020-11-19 LAB
ALBUMIN SERPL BCP-MCNC: 4.2 G/DL (ref 3.5–5.2)
ALP SERPL-CCNC: 85 U/L (ref 55–135)
ALT SERPL W/O P-5'-P-CCNC: 5 U/L (ref 10–44)
ANION GAP SERPL CALC-SCNC: 10 MMOL/L (ref 8–16)
AST SERPL-CCNC: 8 U/L (ref 10–40)
BASOPHILS # BLD AUTO: 0.04 K/UL (ref 0–0.2)
BASOPHILS NFR BLD: 0.5 % (ref 0–1.9)
BILIRUB SERPL-MCNC: 0.7 MG/DL (ref 0.1–1)
BUN SERPL-MCNC: 16 MG/DL (ref 8–23)
CALCIUM SERPL-MCNC: 9.7 MG/DL (ref 8.7–10.5)
CHLORIDE SERPL-SCNC: 100 MMOL/L (ref 95–110)
CO2 SERPL-SCNC: 28 MMOL/L (ref 23–29)
COMPLEXED PSA SERPL-MCNC: 0.9 NG/ML (ref 0–4)
CREAT SERPL-MCNC: 1.1 MG/DL (ref 0.5–1.4)
CRP SERPL-MCNC: 1 MG/L (ref 0–8.2)
DIFFERENTIAL METHOD: ABNORMAL
EOSINOPHIL # BLD AUTO: 0.1 K/UL (ref 0–0.5)
EOSINOPHIL NFR BLD: 0.8 % (ref 0–8)
ERYTHROCYTE [DISTWIDTH] IN BLOOD BY AUTOMATED COUNT: 12.5 % (ref 11.5–14.5)
ERYTHROCYTE [SEDIMENTATION RATE] IN BLOOD BY WESTERGREN METHOD: 5 MM/HR (ref 0–10)
EST. GFR  (AFRICAN AMERICAN): >60 ML/MIN/1.73 M^2
EST. GFR  (NON AFRICAN AMERICAN): >60 ML/MIN/1.73 M^2
GLUCOSE SERPL-MCNC: 281 MG/DL (ref 70–110)
HCT VFR BLD AUTO: 44.8 % (ref 40–54)
HGB BLD-MCNC: 15 G/DL (ref 14–18)
IMM GRANULOCYTES # BLD AUTO: 0.01 K/UL (ref 0–0.04)
IMM GRANULOCYTES NFR BLD AUTO: 0.1 % (ref 0–0.5)
LYMPHOCYTES # BLD AUTO: 1.4 K/UL (ref 1–4.8)
LYMPHOCYTES NFR BLD: 17.4 % (ref 18–48)
MCH RBC QN AUTO: 32.4 PG (ref 27–31)
MCHC RBC AUTO-ENTMCNC: 33.5 G/DL (ref 32–36)
MCV RBC AUTO: 97 FL (ref 82–98)
MONOCYTES # BLD AUTO: 0.5 K/UL (ref 0.3–1)
MONOCYTES NFR BLD: 6.1 % (ref 4–15)
NEUTROPHILS # BLD AUTO: 5.8 K/UL (ref 1.8–7.7)
NEUTROPHILS NFR BLD: 75.1 % (ref 38–73)
NRBC BLD-RTO: 0 /100 WBC
PLATELET # BLD AUTO: 267 K/UL (ref 150–350)
PMV BLD AUTO: 12.4 FL (ref 9.2–12.9)
POTASSIUM SERPL-SCNC: 4 MMOL/L (ref 3.5–5.1)
PROT SERPL-MCNC: 7.2 G/DL (ref 6–8.4)
RBC # BLD AUTO: 4.63 M/UL (ref 4.6–6.2)
SODIUM SERPL-SCNC: 138 MMOL/L (ref 136–145)
TSH SERPL DL<=0.005 MIU/L-ACNC: 1.03 UIU/ML (ref 0.4–4)
WBC # BLD AUTO: 7.74 K/UL (ref 3.9–12.7)

## 2020-11-19 PROCEDURE — 84153 ASSAY OF PSA TOTAL: CPT | Mod: HCNC

## 2020-11-19 PROCEDURE — 80053 COMPREHEN METABOLIC PANEL: CPT | Mod: HCNC

## 2020-11-19 PROCEDURE — 85025 COMPLETE CBC W/AUTO DIFF WBC: CPT | Mod: HCNC

## 2020-11-19 PROCEDURE — 83036 HEMOGLOBIN GLYCOSYLATED A1C: CPT | Mod: HCNC

## 2020-11-19 PROCEDURE — 85651 RBC SED RATE NONAUTOMATED: CPT | Mod: HCNC,PO

## 2020-11-19 PROCEDURE — 36415 COLL VENOUS BLD VENIPUNCTURE: CPT | Mod: HCNC,PO

## 2020-11-19 PROCEDURE — 84443 ASSAY THYROID STIM HORMONE: CPT | Mod: HCNC

## 2020-11-19 PROCEDURE — 86140 C-REACTIVE PROTEIN: CPT | Mod: HCNC

## 2020-11-20 ENCOUNTER — DOCUMENT SCAN (OUTPATIENT)
Dept: HOME HEALTH SERVICES | Facility: HOSPITAL | Age: 80
End: 2020-11-20
Payer: MEDICARE

## 2020-11-20 ENCOUNTER — NURSE TRIAGE (OUTPATIENT)
Dept: ADMINISTRATIVE | Facility: CLINIC | Age: 80
End: 2020-11-20

## 2020-11-20 LAB
ESTIMATED AVG GLUCOSE: 137 MG/DL (ref 68–131)
HBA1C MFR BLD HPLC: 6.4 % (ref 4–5.6)

## 2020-11-20 NOTE — TELEPHONE ENCOUNTER
Reason for Disposition   Nursing judgment    Additional Information   Negative: Sounds like a life-threatening emergency to the triager   Negative: Information only call about a Well Adult (no illness or injury)    Protocols used: NO PROTOCOL AVAILABLE - SICK ADULT-A-[place]  spouse called re [place]. meds constipates him. pt has been uses mialxax and mineral oil. pt hasnt had BM in two weeks. Spouse states that she gave pt an used enema but pt states too painful. no N/V, having rectal pains. rec ED. Spouse agrees. call back with questions

## 2020-12-01 ENCOUNTER — PATIENT MESSAGE (OUTPATIENT)
Dept: FAMILY MEDICINE | Facility: CLINIC | Age: 80
End: 2020-12-01

## 2020-12-01 DIAGNOSIS — M35.3 PMR (POLYMYALGIA RHEUMATICA): ICD-10-CM

## 2020-12-01 RX ORDER — PREDNISONE 2.5 MG/1
2.5 TABLET ORAL 2 TIMES DAILY
Qty: 180 TABLET | Refills: 1 | Status: SHIPPED | OUTPATIENT
Start: 2020-12-01

## 2020-12-08 ENCOUNTER — PATIENT MESSAGE (OUTPATIENT)
Dept: FAMILY MEDICINE | Facility: CLINIC | Age: 80
End: 2020-12-08

## 2020-12-08 DIAGNOSIS — R26.9 GAIT ABNORMALITY: ICD-10-CM

## 2020-12-08 DIAGNOSIS — M15.9 PRIMARY OSTEOARTHRITIS INVOLVING MULTIPLE JOINTS: ICD-10-CM

## 2020-12-08 DIAGNOSIS — G31.84 MCI (MILD COGNITIVE IMPAIRMENT): ICD-10-CM

## 2020-12-08 DIAGNOSIS — R29.6 FREQUENT FALLS: ICD-10-CM

## 2020-12-08 DIAGNOSIS — G20.C PRIMARY PARKINSONISM: Primary | ICD-10-CM

## 2020-12-08 DIAGNOSIS — F33.9 MAJOR DEPRESSION, RECURRENT, CHRONIC: ICD-10-CM

## 2020-12-08 DIAGNOSIS — Z87.81 HISTORY OF COMPRESSION FRACTURE OF SPINE: ICD-10-CM

## 2020-12-08 DIAGNOSIS — G89.29 CHRONIC LEFT SHOULDER PAIN: ICD-10-CM

## 2020-12-08 DIAGNOSIS — M48.062 SPINAL STENOSIS OF LUMBAR REGION WITH NEUROGENIC CLAUDICATION: ICD-10-CM

## 2020-12-08 DIAGNOSIS — E11.40 TYPE 2 DIABETES MELLITUS WITH DIABETIC NEUROPATHY, WITHOUT LONG-TERM CURRENT USE OF INSULIN: ICD-10-CM

## 2020-12-08 DIAGNOSIS — M25.512 CHRONIC LEFT SHOULDER PAIN: ICD-10-CM

## 2020-12-09 RX ORDER — TRAMADOL HYDROCHLORIDE 50 MG/1
50 TABLET ORAL EVERY 8 HOURS PRN
Qty: 21 TABLET | Refills: 0 | Status: SHIPPED | OUTPATIENT
Start: 2020-12-09 | End: 2020-12-16

## 2020-12-09 NOTE — TELEPHONE ENCOUNTER
No answer.  I sent a few tramadol to the pharmacy, be aware this may cause drowsiness.  If it looks like he had any significant injury then they would need to go to the ER.  Otherwise I did place Home health orders placed.

## 2020-12-09 NOTE — TELEPHONE ENCOUNTER
I discussed with spouse on the phone.  Will have him try the tramadol and skip the muscle relaxer.  Would prefer not to add too many medications due to risk of falls and side effects.  We did order home health.

## 2020-12-11 ENCOUNTER — PATIENT MESSAGE (OUTPATIENT)
Dept: OTHER | Facility: OTHER | Age: 80
End: 2020-12-11

## 2020-12-12 ENCOUNTER — TELEPHONE (OUTPATIENT)
Dept: FAMILY MEDICINE | Facility: CLINIC | Age: 80
End: 2020-12-12

## 2020-12-12 DIAGNOSIS — R29.6 FREQUENT FALLS: ICD-10-CM

## 2020-12-12 DIAGNOSIS — M48.062 SPINAL STENOSIS OF LUMBAR REGION WITH NEUROGENIC CLAUDICATION: ICD-10-CM

## 2020-12-12 DIAGNOSIS — G31.84 MCI (MILD COGNITIVE IMPAIRMENT): ICD-10-CM

## 2020-12-12 DIAGNOSIS — E11.40 TYPE 2 DIABETES MELLITUS WITH DIABETIC NEUROPATHY, WITHOUT LONG-TERM CURRENT USE OF INSULIN: ICD-10-CM

## 2020-12-12 DIAGNOSIS — M25.512 CHRONIC LEFT SHOULDER PAIN: ICD-10-CM

## 2020-12-12 DIAGNOSIS — E11.59 HYPERTENSION ASSOCIATED WITH DIABETES: ICD-10-CM

## 2020-12-12 DIAGNOSIS — G89.29 CHRONIC MIDLINE LOW BACK PAIN WITH BILATERAL SCIATICA: ICD-10-CM

## 2020-12-12 DIAGNOSIS — M35.3 PMR (POLYMYALGIA RHEUMATICA): ICD-10-CM

## 2020-12-12 DIAGNOSIS — G89.29 CHRONIC LEFT SHOULDER PAIN: ICD-10-CM

## 2020-12-12 DIAGNOSIS — G20.C PRIMARY PARKINSONISM: Primary | ICD-10-CM

## 2020-12-12 DIAGNOSIS — Z85.46 HISTORY OF PROSTATE CANCER: ICD-10-CM

## 2020-12-12 DIAGNOSIS — M54.41 CHRONIC MIDLINE LOW BACK PAIN WITH BILATERAL SCIATICA: ICD-10-CM

## 2020-12-12 DIAGNOSIS — F32.1 CURRENT MODERATE EPISODE OF MAJOR DEPRESSIVE DISORDER WITHOUT PRIOR EPISODE: ICD-10-CM

## 2020-12-12 DIAGNOSIS — R26.9 GAIT ABNORMALITY: ICD-10-CM

## 2020-12-12 DIAGNOSIS — E11.22 TYPE 2 DIABETES MELLITUS WITH STAGE 3 CHRONIC KIDNEY DISEASE, WITHOUT LONG-TERM CURRENT USE OF INSULIN, UNSPECIFIED WHETHER STAGE 3A OR 3B CKD: ICD-10-CM

## 2020-12-12 DIAGNOSIS — I15.2 HYPERTENSION ASSOCIATED WITH DIABETES: ICD-10-CM

## 2020-12-12 DIAGNOSIS — M15.9 PRIMARY OSTEOARTHRITIS INVOLVING MULTIPLE JOINTS: ICD-10-CM

## 2020-12-12 DIAGNOSIS — M54.42 CHRONIC MIDLINE LOW BACK PAIN WITH BILATERAL SCIATICA: ICD-10-CM

## 2020-12-12 DIAGNOSIS — N18.30 TYPE 2 DIABETES MELLITUS WITH STAGE 3 CHRONIC KIDNEY DISEASE, WITHOUT LONG-TERM CURRENT USE OF INSULIN, UNSPECIFIED WHETHER STAGE 3A OR 3B CKD: ICD-10-CM

## 2020-12-12 PROBLEM — F32.9 CURRENT EPISODE OF MAJOR DEPRESSIVE DISORDER WITHOUT PRIOR EPISODE: Status: ACTIVE | Noted: 2020-08-02

## 2020-12-12 NOTE — TELEPHONE ENCOUNTER
[facility]  [facility]  [address] [place] [address]    Phone - [phone]  Fax -  [phone]

## 2020-12-12 NOTE — TELEPHONE ENCOUNTER
Contact by spouse.  He continues to do poorly.  Some decreased appetite, decreased urination, constipation.  No fever chills.  Recent fall with some back pain.  Apparent adequate control with tramadol at present.  Does not want to go the hospital.  At this point they are interested in hospice.  He has had a continuing downward course during the past year.  He is not interested in nursing home placement.  Just got on home health with physical therapy, but does not feel he is able to participate with that currently.    They are interested in hospice which I think is appropriate at this point.  Currently with [facility].  Please See if they have a hospice program otherwise put in referral for [facility] hospice program.

## 2020-12-16 ENCOUNTER — PATIENT OUTREACH (OUTPATIENT)
Dept: ADMINISTRATIVE | Facility: OTHER | Age: 80
End: 2020-12-16

## 2020-12-16 NOTE — PROGRESS NOTES
Health Maintenance Due   Topic Date Due    Shingles Vaccine (2 of 3) 12/16/2013    Eye Exam  03/08/2020    Foot Exam  02/06/2021     Updates were requested from care everywhere.  Chart was reviewed for overdue Proactive [facility] Encounters (NICOLE) topics (CRS, Breast Cancer Screening, Eye exam)  Health Maintenance has been updated.  LINKS immunization registry triggered.  Immunizations were reconciled.

## 2020-12-24 ENCOUNTER — DOCUMENT SCAN (OUTPATIENT)
Dept: HOME HEALTH SERVICES | Facility: HOSPITAL | Age: 80
End: 2020-12-24
Payer: MEDICARE

## 2020-12-28 ENCOUNTER — EXTERNAL HOME HEALTH (OUTPATIENT)
Dept: HOME HEALTH SERVICES | Facility: HOSPITAL | Age: 80
End: 2020-12-28
Payer: MEDICARE

## 2021-02-18 ENCOUNTER — TELEPHONE (OUTPATIENT)
Dept: PRIMARY CARE CLINIC | Facility: CLINIC | Age: 81
End: 2021-02-18

## 2021-03-15 ENCOUNTER — PES CALL (OUTPATIENT)
Dept: ADMINISTRATIVE | Facility: CLINIC | Age: 81
End: 2021-03-15

## 2021-07-07 ENCOUNTER — PATIENT MESSAGE (OUTPATIENT)
Dept: NEUROLOGY | Facility: CLINIC | Age: 81
End: 2021-07-07

## 2023-11-26 NOTE — PROGRESS NOTES
Chart reviewed.   Requested updates from Care Everywhere.  Immunizations reconciled.   HM updated.     POA  Pancx   Initiated broad spectrum abx  Trend lactic acid  11/26/2023 WNL

## (undated) DEVICE — TRAY FOLEY 16FR INFECTION CONT

## (undated) DEVICE — CLIPPER BLADE MOD 4406 (CAREF)

## (undated) DEVICE — SUT 3/0 27IN PDS II VIO MO

## (undated) DEVICE — SUT CTD VICRYL 2-0 UND BR

## (undated) DEVICE — BANDAGE ADHESIVE

## (undated) DEVICE — KIT ANTIFOG

## (undated) DEVICE — SYR 30CC LUER LOCK

## (undated) DEVICE — SEE MEDLINE ITEM 146347

## (undated) DEVICE — SUT VICRYL PLUS 0 CT1 18IN

## (undated) DEVICE — TUBING HF INSUFFLATION W/ FLTR

## (undated) DEVICE — SUT 3-0 VICRYL SH CR/8 18

## (undated) DEVICE — APPLICATOR CHLORAPREP ORN 26ML

## (undated) DEVICE — GOWN SMART IMP BREATHABLE XXLG

## (undated) DEVICE — DRAPE ABDOMINAL TIBURON 14X11

## (undated) DEVICE — ELECTRODE REM PLYHSV RETURN 9

## (undated) DEVICE — COVER LIGHT HANDLE 80/CA

## (undated) DEVICE — SUT CTD VICRYL VIL BR SH 27

## (undated) DEVICE — SEE MEDLINE ITEM 152487

## (undated) DEVICE — SYR ONLY LUER LOCK 20CC

## (undated) DEVICE — SUT 4/0 36IN ETHIBOND EXCE

## (undated) DEVICE — TRAY MINOR GEN SURG

## (undated) DEVICE — SEE MEDLINE ITEM 146417

## (undated) DEVICE — SUT CTD VICRYL VIL BR UR-6

## (undated) DEVICE — NDL BOX COUNTER

## (undated) DEVICE — Device

## (undated) DEVICE — SUT 1 48IN PDS II VIO MONO

## (undated) DEVICE — LUBRICANT SURGILUBE 2 OZ

## (undated) DEVICE — SUT CTD VICRYL BR CR/SH VIL

## (undated) DEVICE — SEE MEDLINE ITEM 157144

## (undated) DEVICE — SUT 1 36IN PDS II VIO MONO

## (undated) DEVICE — SUT MCRYL PLUS 4-0 PS2 27IN

## (undated) DEVICE — DRESSING ABSRBNT ISLAND 3.6X8

## (undated) DEVICE — NDL 22GA X1 1/2 REG BEVEL